# Patient Record
Sex: MALE | Race: WHITE | NOT HISPANIC OR LATINO | URBAN - METROPOLITAN AREA
[De-identification: names, ages, dates, MRNs, and addresses within clinical notes are randomized per-mention and may not be internally consistent; named-entity substitution may affect disease eponyms.]

---

## 2019-08-14 ENCOUNTER — INPATIENT (INPATIENT)
Facility: HOSPITAL | Age: 69
LOS: 5 days | Discharge: NURSING, REHAB OR LONG-TERM CARE | DRG: 690 | End: 2019-08-20
Attending: INTERNAL MEDICINE | Admitting: INTERNAL MEDICINE
Payer: MEDICARE

## 2019-08-14 VITALS
DIASTOLIC BLOOD PRESSURE: 75 MMHG | RESPIRATION RATE: 18 BRPM | SYSTOLIC BLOOD PRESSURE: 156 MMHG | HEART RATE: 90 BPM | TEMPERATURE: 98 F | WEIGHT: 177.03 LBS | OXYGEN SATURATION: 99 %

## 2019-08-14 DIAGNOSIS — Z98.890 OTHER SPECIFIED POSTPROCEDURAL STATES: Chronic | ICD-10-CM

## 2019-08-14 DIAGNOSIS — F25.8 OTHER SCHIZOAFFECTIVE DISORDERS: ICD-10-CM

## 2019-08-14 DIAGNOSIS — I10 ESSENTIAL (PRIMARY) HYPERTENSION: ICD-10-CM

## 2019-08-14 DIAGNOSIS — Z29.9 ENCOUNTER FOR PROPHYLACTIC MEASURES, UNSPECIFIED: ICD-10-CM

## 2019-08-14 DIAGNOSIS — F02.81 DEMENTIA IN OTHER DISEASES CLASSIFIED ELSEWHERE, UNSPECIFIED SEVERITY, WITH BEHAVIORAL DISTURBANCE: ICD-10-CM

## 2019-08-14 DIAGNOSIS — H40.9 UNSPECIFIED GLAUCOMA: ICD-10-CM

## 2019-08-14 DIAGNOSIS — R45.851 SUICIDAL IDEATIONS: ICD-10-CM

## 2019-08-14 DIAGNOSIS — F31.9 BIPOLAR DISORDER, UNSPECIFIED: ICD-10-CM

## 2019-08-14 DIAGNOSIS — R45.1 RESTLESSNESS AND AGITATION: ICD-10-CM

## 2019-08-14 DIAGNOSIS — N39.0 URINARY TRACT INFECTION, SITE NOT SPECIFIED: ICD-10-CM

## 2019-08-14 DIAGNOSIS — K21.9 GASTRO-ESOPHAGEAL REFLUX DISEASE WITHOUT ESOPHAGITIS: ICD-10-CM

## 2019-08-14 LAB
ALBUMIN SERPL ELPH-MCNC: 3.5 G/DL — SIGNIFICANT CHANGE UP (ref 3.3–5)
ALP SERPL-CCNC: 98 U/L — SIGNIFICANT CHANGE UP (ref 30–120)
ALT FLD-CCNC: 12 U/L DA — SIGNIFICANT CHANGE UP (ref 10–60)
ANION GAP SERPL CALC-SCNC: 3 MMOL/L — LOW (ref 5–17)
APAP SERPL-MCNC: 1 UG/ML — LOW (ref 10–30)
APPEARANCE UR: ABNORMAL
AST SERPL-CCNC: 7 U/L — LOW (ref 10–40)
BASOPHILS # BLD AUTO: 0.02 K/UL — SIGNIFICANT CHANGE UP (ref 0–0.2)
BASOPHILS NFR BLD AUTO: 0.3 % — SIGNIFICANT CHANGE UP (ref 0–2)
BILIRUB SERPL-MCNC: 0.3 MG/DL — SIGNIFICANT CHANGE UP (ref 0.2–1.2)
BILIRUB UR-MCNC: NEGATIVE — SIGNIFICANT CHANGE UP
BUN SERPL-MCNC: 14 MG/DL — SIGNIFICANT CHANGE UP (ref 7–23)
CALCIUM SERPL-MCNC: 9.3 MG/DL — SIGNIFICANT CHANGE UP (ref 8.4–10.5)
CHLORIDE SERPL-SCNC: 105 MMOL/L — SIGNIFICANT CHANGE UP (ref 96–108)
CO2 SERPL-SCNC: 31 MMOL/L — SIGNIFICANT CHANGE UP (ref 22–31)
COLOR SPEC: YELLOW — SIGNIFICANT CHANGE UP
CREAT SERPL-MCNC: 0.88 MG/DL — SIGNIFICANT CHANGE UP (ref 0.5–1.3)
DIFF PNL FLD: ABNORMAL
EOSINOPHIL # BLD AUTO: 0.13 K/UL — SIGNIFICANT CHANGE UP (ref 0–0.5)
EOSINOPHIL NFR BLD AUTO: 1.7 % — SIGNIFICANT CHANGE UP (ref 0–6)
ETHANOL SERPL-MCNC: <3 MG/DL — SIGNIFICANT CHANGE UP (ref 0–3)
GLUCOSE SERPL-MCNC: 97 MG/DL — SIGNIFICANT CHANGE UP (ref 70–99)
GLUCOSE UR QL: NEGATIVE MG/DL — SIGNIFICANT CHANGE UP
HCT VFR BLD CALC: 46.3 % — SIGNIFICANT CHANGE UP (ref 39–50)
HGB BLD-MCNC: 15.6 G/DL — SIGNIFICANT CHANGE UP (ref 13–17)
IMM GRANULOCYTES NFR BLD AUTO: 0.4 % — SIGNIFICANT CHANGE UP (ref 0–1.5)
KETONES UR-MCNC: ABNORMAL
LEUKOCYTE ESTERASE UR-ACNC: ABNORMAL
LYMPHOCYTES # BLD AUTO: 1.83 K/UL — SIGNIFICANT CHANGE UP (ref 1–3.3)
LYMPHOCYTES # BLD AUTO: 23.4 % — SIGNIFICANT CHANGE UP (ref 13–44)
MCHC RBC-ENTMCNC: 30.3 PG — SIGNIFICANT CHANGE UP (ref 27–34)
MCHC RBC-ENTMCNC: 33.7 GM/DL — SIGNIFICANT CHANGE UP (ref 32–36)
MCV RBC AUTO: 89.9 FL — SIGNIFICANT CHANGE UP (ref 80–100)
MONOCYTES # BLD AUTO: 0.47 K/UL — SIGNIFICANT CHANGE UP (ref 0–0.9)
MONOCYTES NFR BLD AUTO: 6 % — SIGNIFICANT CHANGE UP (ref 2–14)
NEUTROPHILS # BLD AUTO: 5.33 K/UL — SIGNIFICANT CHANGE UP (ref 1.8–7.4)
NEUTROPHILS NFR BLD AUTO: 68.2 % — SIGNIFICANT CHANGE UP (ref 43–77)
NITRITE UR-MCNC: POSITIVE
NRBC # BLD: 0 /100 WBCS — SIGNIFICANT CHANGE UP (ref 0–0)
PCP SPEC-MCNC: SIGNIFICANT CHANGE UP
PH UR: 8 — SIGNIFICANT CHANGE UP (ref 5–8)
PLATELET # BLD AUTO: 264 K/UL — SIGNIFICANT CHANGE UP (ref 150–400)
POTASSIUM SERPL-MCNC: 3.7 MMOL/L — SIGNIFICANT CHANGE UP (ref 3.5–5.3)
POTASSIUM SERPL-SCNC: 3.7 MMOL/L — SIGNIFICANT CHANGE UP (ref 3.5–5.3)
PROT SERPL-MCNC: 7.1 G/DL — SIGNIFICANT CHANGE UP (ref 6–8.3)
PROT UR-MCNC: NEGATIVE MG/DL — SIGNIFICANT CHANGE UP
RBC # BLD: 5.15 M/UL — SIGNIFICANT CHANGE UP (ref 4.2–5.8)
RBC # FLD: 13.5 % — SIGNIFICANT CHANGE UP (ref 10.3–14.5)
SALICYLATES SERPL-MCNC: 0.5 MG/DL — LOW (ref 2.8–20)
SODIUM SERPL-SCNC: 139 MMOL/L — SIGNIFICANT CHANGE UP (ref 135–145)
SP GR SPEC: 1.01 — SIGNIFICANT CHANGE UP (ref 1.01–1.02)
UROBILINOGEN FLD QL: NEGATIVE MG/DL — SIGNIFICANT CHANGE UP
WBC # BLD: 7.81 K/UL — SIGNIFICANT CHANGE UP (ref 3.8–10.5)
WBC # FLD AUTO: 7.81 K/UL — SIGNIFICANT CHANGE UP (ref 3.8–10.5)

## 2019-08-14 PROCEDURE — 71045 X-RAY EXAM CHEST 1 VIEW: CPT | Mod: 26

## 2019-08-14 PROCEDURE — 99285 EMERGENCY DEPT VISIT HI MDM: CPT

## 2019-08-14 PROCEDURE — 99223 1ST HOSP IP/OBS HIGH 75: CPT | Mod: AI

## 2019-08-14 RX ORDER — LATANOPROST 0.05 MG/ML
1 SOLUTION/ DROPS OPHTHALMIC; TOPICAL AT BEDTIME
Refills: 0 | Status: DISCONTINUED | OUTPATIENT
Start: 2019-08-14 | End: 2019-08-20

## 2019-08-14 RX ORDER — PANTOPRAZOLE SODIUM 20 MG/1
40 TABLET, DELAYED RELEASE ORAL
Refills: 0 | Status: DISCONTINUED | OUTPATIENT
Start: 2019-08-14 | End: 2019-08-20

## 2019-08-14 RX ORDER — DOCUSATE SODIUM 100 MG
200 CAPSULE ORAL AT BEDTIME
Refills: 0 | Status: DISCONTINUED | OUTPATIENT
Start: 2019-08-14 | End: 2019-08-20

## 2019-08-14 RX ORDER — AMLODIPINE BESYLATE 2.5 MG/1
5 TABLET ORAL DAILY
Refills: 0 | Status: DISCONTINUED | OUTPATIENT
Start: 2019-08-14 | End: 2019-08-20

## 2019-08-14 RX ORDER — DIVALPROEX SODIUM 500 MG/1
750 TABLET, DELAYED RELEASE ORAL EVERY 12 HOURS
Refills: 0 | Status: DISCONTINUED | OUTPATIENT
Start: 2019-08-14 | End: 2019-08-20

## 2019-08-14 RX ORDER — LAMOTRIGINE 25 MG/1
100 TABLET, ORALLY DISINTEGRATING ORAL
Refills: 0 | Status: DISCONTINUED | OUTPATIENT
Start: 2019-08-14 | End: 2019-08-20

## 2019-08-14 RX ORDER — HYDRALAZINE HCL 50 MG
50 TABLET ORAL THREE TIMES A DAY
Refills: 0 | Status: DISCONTINUED | OUTPATIENT
Start: 2019-08-14 | End: 2019-08-20

## 2019-08-14 RX ORDER — FOLIC ACID 0.8 MG
1 TABLET ORAL DAILY
Refills: 0 | Status: DISCONTINUED | OUTPATIENT
Start: 2019-08-14 | End: 2019-08-20

## 2019-08-14 RX ORDER — ENOXAPARIN SODIUM 100 MG/ML
40 INJECTION SUBCUTANEOUS DAILY
Refills: 0 | Status: DISCONTINUED | OUTPATIENT
Start: 2019-08-14 | End: 2019-08-20

## 2019-08-14 RX ORDER — CEFTRIAXONE 500 MG/1
1000 INJECTION, POWDER, FOR SOLUTION INTRAMUSCULAR; INTRAVENOUS ONCE
Refills: 0 | Status: COMPLETED | OUTPATIENT
Start: 2019-08-14 | End: 2019-08-14

## 2019-08-14 RX ORDER — ACETAMINOPHEN 500 MG
650 TABLET ORAL EVERY 6 HOURS
Refills: 0 | Status: DISCONTINUED | OUTPATIENT
Start: 2019-08-14 | End: 2019-08-20

## 2019-08-14 RX ORDER — ALPRAZOLAM 0.25 MG
0.25 TABLET ORAL ONCE
Refills: 0 | Status: DISCONTINUED | OUTPATIENT
Start: 2019-08-14 | End: 2019-08-15

## 2019-08-14 RX ORDER — DOCUSATE SODIUM 100 MG
100 CAPSULE ORAL DAILY
Refills: 0 | Status: DISCONTINUED | OUTPATIENT
Start: 2019-08-14 | End: 2019-08-20

## 2019-08-14 RX ORDER — CEFTRIAXONE 500 MG/1
1000 INJECTION, POWDER, FOR SOLUTION INTRAMUSCULAR; INTRAVENOUS EVERY 24 HOURS
Refills: 0 | Status: DISCONTINUED | OUTPATIENT
Start: 2019-08-14 | End: 2019-08-20

## 2019-08-14 RX ADMIN — CEFTRIAXONE 1000 MILLIGRAM(S): 500 INJECTION, POWDER, FOR SOLUTION INTRAMUSCULAR; INTRAVENOUS at 20:36

## 2019-08-14 RX ADMIN — CEFTRIAXONE 100 MILLIGRAM(S): 500 INJECTION, POWDER, FOR SOLUTION INTRAMUSCULAR; INTRAVENOUS at 19:25

## 2019-08-14 RX ADMIN — Medication 0.5 MILLIGRAM(S): at 20:42

## 2019-08-14 RX ADMIN — Medication 50 MILLIGRAM(S): at 22:57

## 2019-08-14 NOTE — ED PROVIDER NOTE - PROGRESS NOTE DETAILS
Dw MD Dr Carrington [phone] - states pt made verbal threat to hurt himself after being upset about being discharged from his SNF / rehab. This is unusual for pt, no hx in past. Pt doing well, no acute co>. Awaiting telepsy dispo Dw telepsy, pt with recent decline , will need admission. Due to comorbdities, will need admission to med, 1:1 observation, they will follow pt - for later placement

## 2019-08-14 NOTE — ED BEHAVIORAL HEALTH ASSESSMENT NOTE - RISK ASSESSMENT
Acute Suicide Risk  (  ) High   (x  ) Moderate   (  ) Low   (  ) Unable to determine   Rationale ___reporting recent suicidal ideation with hx of SA________     Elevated Chronic Risk   (  x) Yes _chronic mental illness, hx of self harm, impulsive, dementia, poor coping skills__________  Details ___________  (  ) No   ___________

## 2019-08-14 NOTE — ED PROVIDER NOTE - CARE PLAN
Principal Discharge DX:	Agitation  Goal:	with possible suicidal ideation Principal Discharge DX:	Agitation  Secondary Diagnosis:	Suicidal ideation  Secondary Diagnosis:	Urinary tract infection without hematuria, site unspecified

## 2019-08-14 NOTE — ED PROVIDER NOTE - CLINICAL SUMMARY MEDICAL DECISION MAKING FREE TEXT BOX
Verbalized possible suicidal ideation today at SNF after acting out to bad news. Denies S/H ideation at this time. Check labs, XR, telepsy

## 2019-08-14 NOTE — ED BEHAVIORAL HEALTH ASSESSMENT NOTE - DIFFERENTIAL
UTI  schizoaffective  dementia  C-SSRS Screener   1. Have you ever wished to be dead or wished you could go to sleep and not wake up?  [ x ]Yes, [  ]No, [  ]Unable to Assess  Details _____making SI statements________________________   2. Have you actually had any thoughts of killing yourself?   [  ]Yes, [  ]No, [ x ]Unable to Assess  Details _____________________________   If answer is “No” for 1 and 2, stop here. If answer is “Yes” to 1 or 2, proceed to 3.   3. Have you been thinking about how you might kill yourself?  [  ]Yes, [ x ]No, [  ]Unable to Assess  Details _____________________________   4. Have you had these thoughts and had some intention of acting on them?  [  ]Yes, [  ]No, [x  ]Unable to Assess  Details _____________________________  5. Have you started to work out or worked out the details of how to kill yourself? Do you intend to carry out this plan?  [  ]Yes, [  ]No, [x  ]Unable to Assess  Details _____________________________  6. Have you ever done anything, started to do anything, or prepared to do anything to end your life? If so, was it in the past 3 months?  [  ]Yes, [  ]No, [x  ]Unable to Assess  Details _____________________________   Additional Suicide Risk Factors (select all that apply)  [  ]Access to lethal means including firearms  [  ]Family history of suicide  [ x ]Impulsivity  [  ] Current or past mood disorder  [x  ] Current or past psychotic disorder  [  ] Current or past PTSD  [  ] Current or past ADHD  [ x ] Current or past TBI  [  ] Current or past cluster B personality disorder or traits  [  ] Current or past conduct problems  [  ] Recent onset of current or past psychiatric disorder  [  ] Family history of psychiatric diagnoses requiring hospitalization  Additional Activating Events (select all that apply)  [  ]Perceived burden on family or others  [  ]Current sexual or physical abuse  [  ]Substance intoxication or withdrawal  [  ]Inadequate social supports  [  ]Hopeless about or dissatisfied with current provider or treatment  Additional Protective Factors (select all that apply)  [  ] Future plans  [  ] Christianity beliefs  [  ] Beloved pets

## 2019-08-14 NOTE — ED BEHAVIORAL HEALTH ASSESSMENT NOTE - OTHER PAST PSYCHIATRIC HISTORY (INCLUDE DETAILS REGARDING ONSET, COURSE OF ILLNESS, INPATIENT/OUTPATIENT TREATMENT)
hx of court appointed legal guardian for medical decision making   hx of psychiatric admission for schizoaffective  hx of SI/SA

## 2019-08-14 NOTE — ED PROVIDER NOTE - RESPIRATORY, MLM
Breath sounds clear and equal bilaterally. Breath sounds clear and equal bilaterally. nl resp effort. no w/r/r.

## 2019-08-14 NOTE — ED BEHAVIORAL HEALTH ASSESSMENT NOTE - HPI (INCLUDE ILLNESS QUALITY, SEVERITY, DURATION, TIMING, CONTEXT, MODIFYING FACTORS, ASSOCIATED SIGNS AND SYMPTOMS)
68 year old male, currently in treatment at [facility] (short term rehabilitation [place]), PMHx of TBI, NPH with shunt, hx of TBI, hx of encephalopathy, HTN, dementia with behavioral disturbance, PPHx Bipolar vs schizoaffective disorder with chronic irritability, hx of SI and suicide attempt, with court appointed legal guardian, presenting to Emergency Department after agitation and suicidal ideation at Rehab.    Patient on exam Is notably irritable, stating he does not like all the "questions I have to answer." Denies a past psychiatry history stating "my family is the one who are alcoholics." Denies current suicidal ideation or homicidal ideation and is unable to tell writer why he is in Emergency Department other than "I have bad knees, they bothered me and I just wanted to sleep." He gets more irritable stating "and don't ask me the time or date, I don't know!". He then says "I don't want to be yelling at you but these questions are bothering me." He does provide cousin's phone number. He also reports "I just want to go home and live on the streets."    Spoke with Elvira, rehab , who reports patient has been living at the [facility] due to deconditioning and poor mobility, also has chronic dementia, TBI, and schizoaffective disorder. States at baseline patient is irritable, chronically difficult and verbally aggressive. However says over the past 2 weeks he has been notably more irritable after his roommate was discharged. Making statements about staff at Rehab such as "get me a gun I want to shoot them" and "I just want to slit their throats." Elvira believes patient has no capacity to make medical decisions and this role was assigned to dorothysin, Anel Cook, as legal guardian. Elvira reports patient is compliant with medications if they are placed in his food or drinks. Otherwise he is chronically paranoid towards staff.    Spoke with mell Tam and legal guardian of patient. She reports patient's dementia has been worsening with signs of worsening forgetfulness and agitation. Reports chronic agitation and unable to care for himself. Patient has recently escalated making more threats about suicidal ideation and homicidal ideation and patient has a history of self harm in past. She reports she is attempting to find him a nursing home that is better suited to care for his complex needs. 68 year old male, currently in treatment at [facility] (short term rehabilitation [place]), PMHx of TBI, NPH with shunt, hx of TBI, hx of encephalopathy, HTN, dementia with behavioral disturbance, PPHx Bipolar vs schizoaffective disorder with chronic irritability, hx of SI and suicide attempt, with court appointed legal guardian, presenting to Emergency Department after agitation and suicidal ideation at Rehab.    Patient on exam Is notably irritable, stating he does not like all the "questions I have to answer." Denies a past psychiatry history stating "my family is the one who are alcoholics." Denies current suicidal ideation or homicidal ideation and is unable to tell writer why he is in Emergency Department other than "I have bad knees, they bothered me and I just wanted to sleep." He gets more irritable stating "and don't ask me the time or date, I don't know!". He then says "I don't want to be yelling at you but these questions are bothering me." He does provide cousin's phone number. He also reports "I just want to go home and live on the streets."    Spoke with Elvira, rehab , who reports patient has been living at the [facility] due to deconditioning and poor mobility, also has chronic dementia, TBI, and schizoaffective disorder. States at baseline patient is irritable, chronically difficult and verbally aggressive. However says over the past 2 weeks he has been notably more irritable after his roommate was discharged. Making statements about staff at Rehab such as "get me a gun I want to shoot them" and "I just want to slit their throats." Elvira believes patient has no capacity to make medical decisions and this role was assigned to cousin, Anel Rg, as legal guardian. Elvira reports patient is compliant with medications if they are placed in his food or drinks. Otherwise he is chronically paranoid towards staff.    Spoke with mell Tam and legal guardian of patient. She reports patient's dementia has been worsening with signs of worsening forgetfulness and agitation. Reports chronic agitation and unable to care for himself. Patient has recently escalated making more threats about suicidal ideation and homicidal ideation and patient has a history of self harm in past. She reports she is attempting to find him a nursing home that is better suited to care for his complex needs.

## 2019-08-14 NOTE — ED BEHAVIORAL HEALTH ASSESSMENT NOTE - SUMMARY
68 year old male, currently in treatment at [facility] (short term rehabilitation [place]), PMHx of TBI, NPH with shunt, hx of TBI, hx of encephalopathy, HTN, dementia with behavioral disturbance, PPHx Bipolar vs schizoaffective disorder with chronic irritability, hx of SI and suicide attempt, with court appointed legal guardian, presenting to Emergency Department after agitation and suicidal ideation at Rehab.    Patient has comorbid medical (UTI, TBI, dementia) and psychiatric history (long standing schizoaffective disorder). Patient with recent worsening irritability and SI that can be attributed to the UTI, change in roommate and worsening dementia. Patient requires longer term placement and lacks capacity to leave hospital at this time.

## 2019-08-14 NOTE — ED BEHAVIORAL HEALTH ASSESSMENT NOTE - DESCRIPTION
Patient received IVPB of ceftriaxone for indication of UTI.  Described as irritable by staff. Did not report any suicidal ideation or homicidal ideation in the Emergency Department. Did not require IM sedation or restraints.  Not able to state date but is aware of current situation. normal pressure hydrocephalus s/p shunt placement 2/9/2017 disabled, current in rehab

## 2019-08-14 NOTE — ED BEHAVIORAL HEALTH ASSESSMENT NOTE - OTHER
cousin [facility] notable tremors in hands defer concrete, impaired rationality does not appear to be RIS dementia UTI, loss of roommate external

## 2019-08-14 NOTE — ED BEHAVIORAL HEALTH ASSESSMENT NOTE - DETAILS
patient currently denies SI however cousin reports as early as today patient was making threats to harm himself; history of suicide attempt in past nursing staff at rehab reports patient can get aggressive but currently patient denies HI patient reports "my family are all alcoholics" 20% service line "my knees are no good" will sign out to psych C/L to follow with medical team spoke to rehab and cousin

## 2019-08-14 NOTE — H&P ADULT - NSHPPHYSICALEXAM_GEN_ALL_CORE
-    Vital Signs Last 24 Hrs  T(C): 36.7 (14 Aug 2019 17:03), Max: 36.8 (14 Aug 2019 15:46)  T(F): 98 (14 Aug 2019 17:03), Max: 98.2 (14 Aug 2019 15:46)  HR: 84 (14 Aug 2019 17:03) (84 - 90)  BP: 164/73 (14 Aug 2019 17:03) (156/75 - 164/73)  BP(mean): --  RR: 18 (14 Aug 2019 17:03) (18 - 18)  SpO2: 96% (14 Aug 2019 17:03) (96% - 99%) -    Vital Signs Last 24 Hrs  T(C): 36.7 (14 Aug 2019 17:03), Max: 36.8 (14 Aug 2019 15:46)  T(F): 98 (14 Aug 2019 17:03), Max: 98.2 (14 Aug 2019 15:46)  HR: 84 (14 Aug 2019 17:03) (84 - 90)  BP: 164/73 (14 Aug 2019 17:03) (156/75 - 164/73)  BP(mean): --  RR: 18 (14 Aug 2019 17:03) (18 - 18)  SpO2: 96% (14 Aug 2019 17:03) (96% - 99%)        PHYSICAL EXAM:  	  GENERAL: NAD, well-developed.  HEAD:  Right parietal old traumatic skull deformity.  EYES: PERRLA, conjunctiva clear.  ENMT: no nasal discharge, no angelina-pharyngeal erythema or exudates, MMM.   NECK: Supple, No JVD.  NERVOUS SYSTEM:  Alert & oriented to place & person, but not to time, neurologically intact grossly.  CHEST/LUNG: Good air entry B/L, no rales, rhonchi, or wheezing.  HEART: Normal S1 & acc S2, no murmurs, or extra sounds.  ABDOMEN: Soft, non-tender, non-distended; bowel sounds present, no palpable masses or organomegaly.  EXTREMITIES:  No clubbing, cyanosis, or edema.  VASCULAR: 2+ radial, DPA / PTA pulses B/L.  SKIN: No rashes or lesions.  PSYCH: Irritable, angry, agitated most of the time. -    Vital Signs Last 24 Hrs  T(C): 36.7 (14 Aug 2019 17:03), Max: 36.8 (14 Aug 2019 15:46)  T(F): 98 (14 Aug 2019 17:03), Max: 98.2 (14 Aug 2019 15:46)  HR: 84 (14 Aug 2019 17:03) (84 - 90)  BP: 164/73 (14 Aug 2019 17:03) (156/75 - 164/73)  BP(mean): --  RR: 18 (14 Aug 2019 17:03) (18 - 18)  SpO2: 96% (14 Aug 2019 17:03) (96% - 99%)        PHYSICAL EXAM:  	  GENERAL: NAD, well-developed.  HEAD:  Right parietal old traumatic skull deformity.  EYES: PERRLA, conjunctiva clear.  ENMT: no nasal discharge, no angelina-pharyngeal erythema or exudates, MMM.   NECK: Supple, No JVD.  NERVOUS SYSTEM:  Alert & oriented to place & person, but not to time, neurologically intact grossly.  CHEST/LUNG: Good air entry B/L, no rales, rhonchi, or wheezing.  HEART: Normal S1 & acc S2, no murmurs, or extra sounds.  ABDOMEN: Soft, non-tender, non-distended; bowel sounds present, no palpable masses or organomegaly.  EXTREMITIES:  No clubbing, cyanosis, or edema.  VASCULAR: 2+ radial, DPA / PTA pulses B/L.  SKIN: No rashes or lesions.  PSYCH: Irritable, angry, labile mood, agitated most of the time.

## 2019-08-14 NOTE — ED ADULT NURSE REASSESSMENT NOTE - NS ED NURSE REASSESS COMMENT FT1
pt awake and alert on stretcher respirations even and unlabored color good pt offers no complaints at this time  1:1 at bedside CO maintained pt awaiting consult dispo decision

## 2019-08-14 NOTE — ED BEHAVIORAL HEALTH ASSESSMENT NOTE - PSYCHIATRIC ISSUES AND PLAN (INCLUDE STANDING AND PRN MEDICATION)
continue standing psych meds; lamictal 100mg twice daily, depakote sprinkles 750mg twice daily (sprinkle on food)- check depakote level; KEEP ON 1:1; for agitation can give haldol 2.5mg q6 hr prn (check QTc)

## 2019-08-14 NOTE — H&P ADULT - PROBLEM SELECTOR PLAN 7
IMPROVE VTE Individual Risk Assessment          RISK                                                          Points    [  ] Previous VTE                                                3  [  ] Thrombophilia                                             2  [  ] Lower limb paralysis                                    2       (unable to hold up >15 seconds)    [  ] Current Cancer                                             2         (within 6 months)  [ x] Immobilization > 24 hrs (expected while inpatient)    1  [  ] ICU/CCU stay > 24 hours                            1  [ x ] Age > 60                                                    1    IMPROVE VTE Score 2.    **IMPROVE score of 2, started him on LMWH 40 mg sub Q daily for DVT prophylaxis.

## 2019-08-14 NOTE — ED ADULT NURSE NOTE - CHIEF COMPLAINT QUOTE
Patient sent from rehab facility s/p verbal altercation. patient ending his stay at rehab facility and was told he was going to be discharged to a different long term housing facility when patient became upset, angry and made a violent statement about slitting someone's throat. Patient denies SI or homicidal. admits he made statement out of anger.

## 2019-08-14 NOTE — H&P ADULT - ASSESSMENT
67 y/o M with PMH of HTN, TBI, Normal Pressure Hydrocephalus with shunt, GERD, Glaucoma, and Bipolar disorder sent from rehab facility for hostility & suicidal ideations.

## 2019-08-14 NOTE — ED BEHAVIORAL HEALTH ASSESSMENT NOTE - CURRENT MEDICATION
hydralazine 50mg TID, folic acid 1mg daily, lamictal 100mg twice daily, norvasc 5mg daily, prilosec 20mg ER twice daily, docusate 300mg TID  depakote sprinkle capsules 750mg BID

## 2019-08-14 NOTE — ED ADULT NURSE NOTE - NSHOSCREENINGSIGNS_ED_ALL_ED
reacting to disappointments, criticisms or teasing with extreme and intense anger, blame or a desire for revenge/increasing anger, aggression and destructive behavior/dwelling on experiences of rejection, on injustices or unrealistic fears

## 2019-08-14 NOTE — ED ADULT NURSE NOTE - NSIMPLEMENTINTERV_GEN_ALL_ED
Implemented All Fall with Harm Risk Interventions:  [place] to call system. Call bell, personal items and telephone within reach. Instruct patient to call for assistance. Room bathroom lighting operational. Non-slip footwear when patient is off stretcher. Physically safe environment: no spills, clutter or unnecessary equipment. Stretcher in lowest position, wheels locked, appropriate side rails in place. Provide visual cue, wrist band, yellow gown, etc. Monitor gait and stability. Monitor for mental status changes and reorient to person, place, and time. Review medications for side effects contributing to fall risk. Reinforce activity limits and safety measures with patient and family. Provide visual clues: red socks.

## 2019-08-14 NOTE — H&P ADULT - HISTORY OF PRESENT ILLNESS
This is a 67 y/o M with PMH of HTN, TBI, Normal Pressure Hydrocephalus with shunt, GERD, Glaucoma, and Bipolar disorder who was sent from [facility] facility for hostility & suicidal ideations. As per staff there patient has been living at the facility fro deconditioning, poor mobility & dementia, he is difficult and verbally aggressive at baseline, and unable to care for himself. Over the past 2 weeks he has been notably more irritable after his roommate was discharged, making statements about staff at rehab such as "get me a gun I want to shoot them" and "I just want to slit their throats". Recently he started making more threats about suicidal ideation and homicidal ideation. Of note that patient has a history of a suicidal attempt in the past. At the ED he was found to have a UTI, was evaluated by a tele psych psychiatrist which requested medical admission for placement at a nursing home that is better suited to care for his complex needs, while being followed by psych for the above psych issues. Patient was agitated, shouting "leave me alone", cooperated during my exam, but refused to answer any questions, no further history could be obtained at this time.

## 2019-08-14 NOTE — ED PROVIDER NOTE - PSYCHIATRIC, MLM
Alert and oriented to person, place, time/situation. normal mood and affect. no apparent risk to self or others. Alert and oriented to person, place, time/situation. normal mood and affect. no apparent risk to self or others at this time.

## 2019-08-14 NOTE — ED PROVIDER NOTE - ENMT, MLM
Airway patent, Nasal mucosa clear. Mouth with normal mucosa. Throat has no vesicles, no oropharyngeal exudates and uvula is midline. Airway patent, Nasal mucosa clear. Mouth with normal mucosa. Throat has no vesicles, no oropharyngeal exudates and uvula is midline. nl resp effort. no w/r/r

## 2019-08-14 NOTE — ED PROVIDER NOTE - OBJECTIVE STATEMENT
67 y/o male with a PMHx of HTN presents to the ED c/o agitation. EMS said he was calm and cooperative with them but he had an argument with someone at the facility he was at and said he wanted to stab them but denies any SI or HI in the ER. Pt is angry at the situation and doesn't think he should have been sent to the hospital. As per pt facility pt threatened to kill himself. 69 y/o male with a PMHx of HTN presents to the ED c/o agitation. EMS said he was calm and cooperative with them but he had an argument with someone at the facility he was at and said he wanted to stab them / himself.  Pt is angry at the situation and doesn't think he should have been sent to the hospital.  At this time pt states he was mad that he was being discharged out of his facility and "acted out". Pt denies other co. No trauma. no fever/chills. no cp/sob/palp. ,no somatic complaints. 69 y/o male with a PMHx of HTN presents to the ED c/o agitation. EMS said he was calm and cooperative with them but he had an argument with someone at the facility he was at and said he wanted to stab them / himself.  Pt is angry at the situation and doesn't think he should have been sent to the hospital.  At this time pt states he was mad that he was being discharged out of his facility and "acted out". Pt denies other co. No trauma. no fever/chills. no cp/sob/palp. ,no somatic complaints. No urinary sx, no dysuria / hematuria / fever / abd pain.

## 2019-08-14 NOTE — H&P ADULT - PROBLEM SELECTOR PLAN 2
with homicidal threatening statements, and history of suicidal attempt in the past, was initially evaluated by tele psych psychiatrist prior to admission who recommended medical admission for placement  in a NH that can accommodate his complex needs. Admitted to medical floor, started on constant observation 1:1 for safety, PRN short acting benzodiazepines for agitation/anxiety, Psych consult with Dr. Borjas was called.  Patient asked me for a phone to call his cousine (guardian), was very angry during the call, hanged up stating that she is "useless", and he will never talk to her again when he leaves the hospital,  & case management consults for placement.

## 2019-08-15 LAB
BASOPHILS # BLD AUTO: 0.03 K/UL — SIGNIFICANT CHANGE UP (ref 0–0.2)
BASOPHILS NFR BLD AUTO: 0.3 % — SIGNIFICANT CHANGE UP (ref 0–2)
EOSINOPHIL # BLD AUTO: 0.17 K/UL — SIGNIFICANT CHANGE UP (ref 0–0.5)
EOSINOPHIL NFR BLD AUTO: 1.8 % — SIGNIFICANT CHANGE UP (ref 0–6)
HCT VFR BLD CALC: 47.3 % — SIGNIFICANT CHANGE UP (ref 39–50)
HCV AB S/CO SERPL IA: 0.21 S/CO — SIGNIFICANT CHANGE UP (ref 0–0.99)
HCV AB SERPL-IMP: SIGNIFICANT CHANGE UP
HGB BLD-MCNC: 16 G/DL — SIGNIFICANT CHANGE UP (ref 13–17)
IMM GRANULOCYTES NFR BLD AUTO: 0.4 % — SIGNIFICANT CHANGE UP (ref 0–1.5)
LYMPHOCYTES # BLD AUTO: 1.87 K/UL — SIGNIFICANT CHANGE UP (ref 1–3.3)
LYMPHOCYTES # BLD AUTO: 19.6 % — SIGNIFICANT CHANGE UP (ref 13–44)
MCHC RBC-ENTMCNC: 30.4 PG — SIGNIFICANT CHANGE UP (ref 27–34)
MCHC RBC-ENTMCNC: 33.8 GM/DL — SIGNIFICANT CHANGE UP (ref 32–36)
MCV RBC AUTO: 89.8 FL — SIGNIFICANT CHANGE UP (ref 80–100)
MONOCYTES # BLD AUTO: 0.63 K/UL — SIGNIFICANT CHANGE UP (ref 0–0.9)
MONOCYTES NFR BLD AUTO: 6.6 % — SIGNIFICANT CHANGE UP (ref 2–14)
NEUTROPHILS # BLD AUTO: 6.79 K/UL — SIGNIFICANT CHANGE UP (ref 1.8–7.4)
NEUTROPHILS NFR BLD AUTO: 71.3 % — SIGNIFICANT CHANGE UP (ref 43–77)
NRBC # BLD: 0 /100 WBCS — SIGNIFICANT CHANGE UP (ref 0–0)
PLATELET # BLD AUTO: 256 K/UL — SIGNIFICANT CHANGE UP (ref 150–400)
RBC # BLD: 5.27 M/UL — SIGNIFICANT CHANGE UP (ref 4.2–5.8)
RBC # FLD: 13.6 % — SIGNIFICANT CHANGE UP (ref 10.3–14.5)
TSH SERPL-MCNC: 2.8 UIU/ML — SIGNIFICANT CHANGE UP (ref 0.27–4.2)
WBC # BLD: 9.53 K/UL — SIGNIFICANT CHANGE UP (ref 3.8–10.5)
WBC # FLD AUTO: 9.53 K/UL — SIGNIFICANT CHANGE UP (ref 3.8–10.5)

## 2019-08-15 PROCEDURE — 99233 SBSQ HOSP IP/OBS HIGH 50: CPT

## 2019-08-15 PROCEDURE — 90792 PSYCH DIAG EVAL W/MED SRVCS: CPT

## 2019-08-15 RX ADMIN — CEFTRIAXONE 100 MILLIGRAM(S): 500 INJECTION, POWDER, FOR SOLUTION INTRAMUSCULAR; INTRAVENOUS at 17:34

## 2019-08-15 RX ADMIN — DIVALPROEX SODIUM 750 MILLIGRAM(S): 500 TABLET, DELAYED RELEASE ORAL at 07:06

## 2019-08-15 RX ADMIN — AMLODIPINE BESYLATE 5 MILLIGRAM(S): 2.5 TABLET ORAL at 07:06

## 2019-08-15 RX ADMIN — Medication 50 MILLIGRAM(S): at 22:11

## 2019-08-15 RX ADMIN — PANTOPRAZOLE SODIUM 40 MILLIGRAM(S): 20 TABLET, DELAYED RELEASE ORAL at 07:05

## 2019-08-15 RX ADMIN — LATANOPROST 1 DROP(S): 0.05 SOLUTION/ DROPS OPHTHALMIC; TOPICAL at 22:12

## 2019-08-15 RX ADMIN — Medication 200 MILLIGRAM(S): at 22:11

## 2019-08-15 RX ADMIN — DIVALPROEX SODIUM 750 MILLIGRAM(S): 500 TABLET, DELAYED RELEASE ORAL at 17:34

## 2019-08-15 RX ADMIN — LAMOTRIGINE 100 MILLIGRAM(S): 25 TABLET, ORALLY DISINTEGRATING ORAL at 07:06

## 2019-08-15 RX ADMIN — LAMOTRIGINE 100 MILLIGRAM(S): 25 TABLET, ORALLY DISINTEGRATING ORAL at 17:34
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
## 2019-08-15 NOTE — PROGRESS NOTE ADULT - PROBLEM SELECTOR PLAN 2
- Hx of suicide attempt in past - slitting of throat  - recently suicidal and homicidal ideation  - not manageable at his facility  - Tele-psych consult reviewed  - Pysch Consult requested  - maintain 1:1  - Suicide Precautions  - Social Work for appropriate placement - Hx of suicide attempt in past - slitting of throat  - Hx of Bipolar and/or Schizoaffective Disorder  - recently suicidal and homicidal ideation  - not manageable at his facility  - Tele-psych consult reviewed  - Pysch Consult requested  - maintain 1:1  - Suicide Precautions  - Social Work for appropriate placement

## 2019-08-16 PROCEDURE — 99233 SBSQ HOSP IP/OBS HIGH 50: CPT

## 2019-08-16 PROCEDURE — 93010 ELECTROCARDIOGRAM REPORT: CPT

## 2019-08-16 PROCEDURE — 99231 SBSQ HOSP IP/OBS SF/LOW 25: CPT

## 2019-08-16 RX ADMIN — LAMOTRIGINE 100 MILLIGRAM(S): 25 TABLET, ORALLY DISINTEGRATING ORAL at 17:28

## 2019-08-16 RX ADMIN — LATANOPROST 1 DROP(S): 0.05 SOLUTION/ DROPS OPHTHALMIC; TOPICAL at 21:33

## 2019-08-16 RX ADMIN — PANTOPRAZOLE SODIUM 40 MILLIGRAM(S): 20 TABLET, DELAYED RELEASE ORAL at 07:06

## 2019-08-16 RX ADMIN — AMLODIPINE BESYLATE 5 MILLIGRAM(S): 2.5 TABLET ORAL at 07:05

## 2019-08-16 RX ADMIN — DIVALPROEX SODIUM 750 MILLIGRAM(S): 500 TABLET, DELAYED RELEASE ORAL at 17:28

## 2019-08-16 RX ADMIN — ENOXAPARIN SODIUM 40 MILLIGRAM(S): 100 INJECTION SUBCUTANEOUS at 13:32

## 2019-08-16 RX ADMIN — CEFTRIAXONE 100 MILLIGRAM(S): 500 INJECTION, POWDER, FOR SOLUTION INTRAMUSCULAR; INTRAVENOUS at 17:28

## 2019-08-16 RX ADMIN — Medication 1 MILLIGRAM(S): at 13:32

## 2019-08-16 RX ADMIN — DIVALPROEX SODIUM 750 MILLIGRAM(S): 500 TABLET, DELAYED RELEASE ORAL at 07:05

## 2019-08-16 RX ADMIN — LAMOTRIGINE 100 MILLIGRAM(S): 25 TABLET, ORALLY DISINTEGRATING ORAL at 07:05

## 2019-08-16 RX ADMIN — Medication 200 MILLIGRAM(S): at 21:33

## 2019-08-16 RX ADMIN — Medication 100 MILLIGRAM(S): at 13:32

## 2019-08-16 RX ADMIN — Medication 50 MILLIGRAM(S): at 07:05

## 2019-08-16 NOTE — PROGRESS NOTE ADULT - PROBLEM SELECTOR PLAN 2
- Hx of suicide attempt in past - slitting of throat  - Hx of Bipolar and/or Schizoaffective Disorder  - recent suicidal and homicidal ideation  - Tele-psych consult reviewed  - Luann f/u reviewed, called back this morning, ok to DC 1:1?  - maintain 1:1 for now  - Suicide Precautions  - Social Work for appropriate placement, not clear yet if he can return to [facility] (where he was)

## 2019-08-16 NOTE — PHYSICAL THERAPY INITIAL EVALUATION ADULT - ADDITIONAL COMMENTS
pt is a long term resident at nursing home. Unable to obtain accurate history from pt regarding mobility prior to admission

## 2019-08-16 NOTE — PHYSICAL THERAPY INITIAL EVALUATION ADULT - PERTINENT HX OF CURRENT PROBLEM, REHAB EVAL
pt is a 68 y/o male, admitted from rehab with homicidal and suicidal ideations, agitation. Deconditioning, poor mobility, dementia, +UTI
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
## 2019-08-17 PROCEDURE — 99233 SBSQ HOSP IP/OBS HIGH 50: CPT

## 2019-08-17 RX ADMIN — Medication 100 MILLIGRAM(S): at 13:02

## 2019-08-17 RX ADMIN — Medication 1 MILLIGRAM(S): at 13:02

## 2019-08-17 RX ADMIN — LATANOPROST 1 DROP(S): 0.05 SOLUTION/ DROPS OPHTHALMIC; TOPICAL at 21:48

## 2019-08-17 RX ADMIN — AMLODIPINE BESYLATE 5 MILLIGRAM(S): 2.5 TABLET ORAL at 06:18

## 2019-08-17 RX ADMIN — DIVALPROEX SODIUM 750 MILLIGRAM(S): 500 TABLET, DELAYED RELEASE ORAL at 06:18

## 2019-08-17 RX ADMIN — PANTOPRAZOLE SODIUM 40 MILLIGRAM(S): 20 TABLET, DELAYED RELEASE ORAL at 06:18

## 2019-08-17 RX ADMIN — ENOXAPARIN SODIUM 40 MILLIGRAM(S): 100 INJECTION SUBCUTANEOUS at 13:02

## 2019-08-17 RX ADMIN — LAMOTRIGINE 100 MILLIGRAM(S): 25 TABLET, ORALLY DISINTEGRATING ORAL at 17:39

## 2019-08-17 RX ADMIN — Medication 200 MILLIGRAM(S): at 21:49

## 2019-08-17 RX ADMIN — Medication 50 MILLIGRAM(S): at 06:18

## 2019-08-17 RX ADMIN — Medication 50 MILLIGRAM(S): at 13:02

## 2019-08-17 RX ADMIN — DIVALPROEX SODIUM 750 MILLIGRAM(S): 500 TABLET, DELAYED RELEASE ORAL at 17:47

## 2019-08-17 RX ADMIN — CEFTRIAXONE 100 MILLIGRAM(S): 500 INJECTION, POWDER, FOR SOLUTION INTRAMUSCULAR; INTRAVENOUS at 17:39

## 2019-08-17 RX ADMIN — LAMOTRIGINE 100 MILLIGRAM(S): 25 TABLET, ORALLY DISINTEGRATING ORAL at 06:18

## 2019-08-17 NOTE — PROGRESS NOTE ADULT - PROBLEM SELECTOR PLAN 2
- Hx of suicide attempt in past - slitting of throat  - Hx of Bipolar and/or Schizoaffective Disorder  - recent suicidal and homicidal ideation  - Tele-psych consult reviewed  - Lunan f/u reviewed, called back this morning, ok to DC 1:1?  - maintain 1:1 for now  - Suicide Precautions  - Social Work for appropriate placement, not clear yet if he can return to [facility] ([facility] he was)

## 2019-08-18 PROCEDURE — 99232 SBSQ HOSP IP/OBS MODERATE 35: CPT

## 2019-08-18 RX ADMIN — Medication 50 MILLIGRAM(S): at 13:00

## 2019-08-18 RX ADMIN — Medication 200 MILLIGRAM(S): at 21:15

## 2019-08-18 RX ADMIN — Medication 50 MILLIGRAM(S): at 21:15

## 2019-08-18 RX ADMIN — DIVALPROEX SODIUM 750 MILLIGRAM(S): 500 TABLET, DELAYED RELEASE ORAL at 17:01

## 2019-08-18 RX ADMIN — PANTOPRAZOLE SODIUM 40 MILLIGRAM(S): 20 TABLET, DELAYED RELEASE ORAL at 06:06

## 2019-08-18 RX ADMIN — CEFTRIAXONE 100 MILLIGRAM(S): 500 INJECTION, POWDER, FOR SOLUTION INTRAMUSCULAR; INTRAVENOUS at 17:01

## 2019-08-18 RX ADMIN — Medication 1 MILLIGRAM(S): at 12:00

## 2019-08-18 RX ADMIN — ENOXAPARIN SODIUM 40 MILLIGRAM(S): 100 INJECTION SUBCUTANEOUS at 12:00

## 2019-08-18 RX ADMIN — LAMOTRIGINE 100 MILLIGRAM(S): 25 TABLET, ORALLY DISINTEGRATING ORAL at 06:06

## 2019-08-18 RX ADMIN — LATANOPROST 1 DROP(S): 0.05 SOLUTION/ DROPS OPHTHALMIC; TOPICAL at 21:15

## 2019-08-18 RX ADMIN — DIVALPROEX SODIUM 750 MILLIGRAM(S): 500 TABLET, DELAYED RELEASE ORAL at 06:06

## 2019-08-18 RX ADMIN — LAMOTRIGINE 100 MILLIGRAM(S): 25 TABLET, ORALLY DISINTEGRATING ORAL at 17:01

## 2019-08-18 RX ADMIN — Medication 100 MILLIGRAM(S): at 12:00

## 2019-08-18 RX ADMIN — AMLODIPINE BESYLATE 5 MILLIGRAM(S): 2.5 TABLET ORAL at 06:06

## 2019-08-18 RX ADMIN — Medication 50 MILLIGRAM(S): at 06:06

## 2019-08-18 NOTE — PROGRESS NOTE ADULT - PROBLEM SELECTOR PLAN 2
- Hx of suicide attempt in past - slitting of throat  - Hx of Bipolar and/or Schizoaffective Disorder  - recent suicidal and homicidal ideation  - Discharge planning Psych direction  - Tele-psych consult reviewed  - Luann f/u reviewed, called back this morning, ok to DC 1:1?  - maintain 1:1 for now  - Suicide Precautions  - Social Work for appropriate placement, not clear yet if he can return to [facility] (where he was) - Hx of suicide attempt in past - slitting of throat  - Hx of Bipolar and/or Schizoaffective Disorder  - recent suicidal and homicidal ideation  - Discharge planning Psych direction  - Off 1:1  - Suicide Precautions  - Social Work for appropriate placement, not clear yet if he can return to [facility] (where he was)

## 2019-08-19 PROCEDURE — 99231 SBSQ HOSP IP/OBS SF/LOW 25: CPT

## 2019-08-19 PROCEDURE — 99232 SBSQ HOSP IP/OBS MODERATE 35: CPT

## 2019-08-19 RX ADMIN — LAMOTRIGINE 100 MILLIGRAM(S): 25 TABLET, ORALLY DISINTEGRATING ORAL at 17:29

## 2019-08-19 RX ADMIN — DIVALPROEX SODIUM 750 MILLIGRAM(S): 500 TABLET, DELAYED RELEASE ORAL at 17:29

## 2019-08-19 RX ADMIN — PANTOPRAZOLE SODIUM 40 MILLIGRAM(S): 20 TABLET, DELAYED RELEASE ORAL at 06:40

## 2019-08-19 RX ADMIN — Medication 50 MILLIGRAM(S): at 22:32

## 2019-08-19 RX ADMIN — LAMOTRIGINE 100 MILLIGRAM(S): 25 TABLET, ORALLY DISINTEGRATING ORAL at 06:40

## 2019-08-19 RX ADMIN — Medication 50 MILLIGRAM(S): at 14:30

## 2019-08-19 RX ADMIN — Medication 50 MILLIGRAM(S): at 06:40

## 2019-08-19 RX ADMIN — Medication 100 MILLIGRAM(S): at 12:14

## 2019-08-19 RX ADMIN — AMLODIPINE BESYLATE 5 MILLIGRAM(S): 2.5 TABLET ORAL at 06:40

## 2019-08-19 RX ADMIN — CEFTRIAXONE 100 MILLIGRAM(S): 500 INJECTION, POWDER, FOR SOLUTION INTRAMUSCULAR; INTRAVENOUS at 17:28

## 2019-08-19 RX ADMIN — DIVALPROEX SODIUM 750 MILLIGRAM(S): 500 TABLET, DELAYED RELEASE ORAL at 06:40

## 2019-08-19 RX ADMIN — LATANOPROST 1 DROP(S): 0.05 SOLUTION/ DROPS OPHTHALMIC; TOPICAL at 22:32

## 2019-08-19 RX ADMIN — Medication 200 MILLIGRAM(S): at 22:32

## 2019-08-19 RX ADMIN — Medication 650 MILLIGRAM(S): at 12:43

## 2019-08-19 RX ADMIN — ENOXAPARIN SODIUM 40 MILLIGRAM(S): 100 INJECTION SUBCUTANEOUS at 12:13

## 2019-08-19 RX ADMIN — Medication 650 MILLIGRAM(S): at 12:13

## 2019-08-19 RX ADMIN — Medication 1 MILLIGRAM(S): at 12:13

## 2019-08-19 NOTE — PROGRESS NOTE ADULT - PROBLEM SELECTOR PLAN 2
- Hx of suicide attempt in past - slitting of throat  - Hx of Bipolar and/or Schizoaffective Disorder  - recent suicidal and homicidal ideation  - Discharge planning Psych direction  - Tele-psych consult reviewed  - Luann f/u reviewed, called back this morning, ok to DC 1:1?  - maintain 1:1 for now  - Suicide Precautions  - Social Work for appropriate placement, not clear yet if he can return to [facility] (where he was) - Hx of suicide attempt in past - slitting of throat  - Hx of Bipolar and/or Schizoaffective Disorder  - recent suicidal and homicidal ideation  - Discharge planning Psych direction  - Tele-psych consult reviewed  - Suicide Precautions  - Social Work for appropriate placement, not clear yet if he can return to [facility] (where he was)

## 2019-08-20 ENCOUNTER — TRANSCRIPTION ENCOUNTER (OUTPATIENT)
Age: 69
End: 2019-08-20

## 2019-08-20 VITALS
TEMPERATURE: 98 F | OXYGEN SATURATION: 95 % | RESPIRATION RATE: 16 BRPM | DIASTOLIC BLOOD PRESSURE: 82 MMHG | SYSTOLIC BLOOD PRESSURE: 151 MMHG | HEART RATE: 82 BPM

## 2019-08-20 LAB
ANION GAP SERPL CALC-SCNC: 8 MMOL/L — SIGNIFICANT CHANGE UP (ref 5–17)
BUN SERPL-MCNC: 15 MG/DL — SIGNIFICANT CHANGE UP (ref 7–23)
CALCIUM SERPL-MCNC: 8.8 MG/DL — SIGNIFICANT CHANGE UP (ref 8.4–10.5)
CHLORIDE SERPL-SCNC: 104 MMOL/L — SIGNIFICANT CHANGE UP (ref 96–108)
CO2 SERPL-SCNC: 23 MMOL/L — SIGNIFICANT CHANGE UP (ref 22–31)
CREAT SERPL-MCNC: 0.77 MG/DL — SIGNIFICANT CHANGE UP (ref 0.5–1.3)
GLUCOSE SERPL-MCNC: 86 MG/DL — SIGNIFICANT CHANGE UP (ref 70–99)
HCT VFR BLD CALC: 49.9 % — SIGNIFICANT CHANGE UP (ref 39–50)
HGB BLD-MCNC: 16.3 G/DL — SIGNIFICANT CHANGE UP (ref 13–17)
MCHC RBC-ENTMCNC: 30.6 PG — SIGNIFICANT CHANGE UP (ref 27–34)
MCHC RBC-ENTMCNC: 32.7 GM/DL — SIGNIFICANT CHANGE UP (ref 32–36)
MCV RBC AUTO: 93.8 FL — SIGNIFICANT CHANGE UP (ref 80–100)
NRBC # BLD: 0 /100 WBCS — SIGNIFICANT CHANGE UP (ref 0–0)
PLATELET # BLD AUTO: 223 K/UL — SIGNIFICANT CHANGE UP (ref 150–400)
POTASSIUM SERPL-MCNC: 4.1 MMOL/L — SIGNIFICANT CHANGE UP (ref 3.5–5.3)
POTASSIUM SERPL-SCNC: 4.1 MMOL/L — SIGNIFICANT CHANGE UP (ref 3.5–5.3)
RBC # BLD: 5.32 M/UL — SIGNIFICANT CHANGE UP (ref 4.2–5.8)
RBC # FLD: 14.1 % — SIGNIFICANT CHANGE UP (ref 10.3–14.5)
SODIUM SERPL-SCNC: 135 MMOL/L — SIGNIFICANT CHANGE UP (ref 135–145)
WBC # BLD: 8.81 K/UL — SIGNIFICANT CHANGE UP (ref 3.8–10.5)
WBC # FLD AUTO: 8.81 K/UL — SIGNIFICANT CHANGE UP (ref 3.8–10.5)

## 2019-08-20 PROCEDURE — 99239 HOSP IP/OBS DSCHRG MGMT >30: CPT

## 2019-08-20 RX ORDER — FOLIC ACID 0.8 MG
1 TABLET ORAL
Qty: 0 | Refills: 0 | DISCHARGE
Start: 2019-08-20

## 2019-08-20 RX ORDER — DIVALPROEX SODIUM 500 MG/1
6 TABLET, DELAYED RELEASE ORAL
Qty: 0 | Refills: 0 | DISCHARGE
Start: 2019-08-20

## 2019-08-20 RX ORDER — DOCUSATE SODIUM 100 MG
2 CAPSULE ORAL
Qty: 0 | Refills: 0 | DISCHARGE
Start: 2019-08-20

## 2019-08-20 RX ORDER — LATANOPROST 0.05 MG/ML
1 SOLUTION/ DROPS OPHTHALMIC; TOPICAL
Qty: 0 | Refills: 0 | DISCHARGE
Start: 2019-08-20

## 2019-08-20 RX ORDER — LAMOTRIGINE 25 MG/1
1 TABLET, ORALLY DISINTEGRATING ORAL
Qty: 0 | Refills: 0 | DISCHARGE
Start: 2019-08-20

## 2019-08-20 RX ORDER — AMLODIPINE BESYLATE 2.5 MG/1
1 TABLET ORAL
Qty: 0 | Refills: 0 | DISCHARGE
Start: 2019-08-20

## 2019-08-20 RX ORDER — CEFPODOXIME PROXETIL 100 MG
1 TABLET ORAL
Qty: 10 | Refills: 0
Start: 2019-08-20 | End: 2019-08-24

## 2019-08-20 RX ORDER — QUETIAPINE FUMARATE 200 MG/1
1 TABLET, FILM COATED ORAL
Qty: 0 | Refills: 0 | DISCHARGE
Start: 2019-08-20

## 2019-08-20 RX ORDER — QUETIAPINE FUMARATE 200 MG/1
25 TABLET, FILM COATED ORAL AT BEDTIME
Refills: 0 | Status: DISCONTINUED | OUTPATIENT
Start: 2019-08-20 | End: 2019-08-20

## 2019-08-20 RX ORDER — QUETIAPINE FUMARATE 200 MG/1
2 TABLET, FILM COATED ORAL
Qty: 0 | Refills: 0 | DISCHARGE
Start: 2019-08-20

## 2019-08-20 RX ADMIN — Medication 100 MILLIGRAM(S): at 11:37

## 2019-08-20 RX ADMIN — LAMOTRIGINE 100 MILLIGRAM(S): 25 TABLET, ORALLY DISINTEGRATING ORAL at 06:30

## 2019-08-20 RX ADMIN — DIVALPROEX SODIUM 750 MILLIGRAM(S): 500 TABLET, DELAYED RELEASE ORAL at 06:30

## 2019-08-20 RX ADMIN — PANTOPRAZOLE SODIUM 40 MILLIGRAM(S): 20 TABLET, DELAYED RELEASE ORAL at 06:30

## 2019-08-20 RX ADMIN — Medication 1 MILLIGRAM(S): at 11:37

## 2019-08-20 RX ADMIN — LAMOTRIGINE 100 MILLIGRAM(S): 25 TABLET, ORALLY DISINTEGRATING ORAL at 17:04

## 2019-08-20 RX ADMIN — DIVALPROEX SODIUM 750 MILLIGRAM(S): 500 TABLET, DELAYED RELEASE ORAL at 17:04

## 2019-08-20 RX ADMIN — AMLODIPINE BESYLATE 5 MILLIGRAM(S): 2.5 TABLET ORAL at 06:30

## 2019-08-20 RX ADMIN — CEFTRIAXONE 100 MILLIGRAM(S): 500 INJECTION, POWDER, FOR SOLUTION INTRAMUSCULAR; INTRAVENOUS at 16:39

## 2019-08-20 RX ADMIN — Medication 50 MILLIGRAM(S): at 06:30

## 2019-08-20 RX ADMIN — ENOXAPARIN SODIUM 40 MILLIGRAM(S): 100 INJECTION SUBCUTANEOUS at 11:37

## 2019-08-20 RX ADMIN — Medication 50 MILLIGRAM(S): at 14:33

## 2019-08-20 NOTE — PROGRESS NOTE ADULT - PROBLEM SELECTOR PROBLEM 2
Suicidal ideation

## 2019-08-20 NOTE — PROGRESS NOTE ADULT - ASSESSMENT
67 y/o M with PMH of HTN, TBI, Normal Pressure Hydrocephalus with shunt, GERD, Glaucoma, and Bipolar disorder sent from rehab facility for hostility & suicidal ideations.
69 y/o M with PMH of HTN, TBI, Normal Pressure Hydrocephalus with shunt, GERD, Glaucoma, and Bipolar disorder sent from rehab facility for hostility & suicidal ideations.
69 y/o M with PMH of HTN, TBI, Normal Pressure Hydrocephalus with shunt, GERD, Glaucoma, and Bipolar disorder sent from rehab facility for hostility & suicidal ideations.
67 y/o M with PMH of HTN, TBI, Normal Pressure Hydrocephalus with shunt, GERD, Glaucoma, and Bipolar disorder sent from rehab facility for hostility & suicidal ideations.

## 2019-08-20 NOTE — PROGRESS NOTE BEHAVIORAL HEALTH - NSBHCHARTREVIEWLAB_PSY_A_CORE FT
16.0   9.53  )-----------( 256      ( 15 Aug 2019 08:01 )             47.3   08-14    139  |  105  |  14  ----------------------------<  97  3.7   |  31  |  0.88    Ca    9.3      14 Aug 2019 16:15    TPro  7.1  /  Alb  3.5  /  TBili  0.3  /  DBili  x   /  AST  7<L>  /  ALT  12  /  AlkPhos  98  08-14

## 2019-08-20 NOTE — PROGRESS NOTE BEHAVIORAL HEALTH - RISK ASSESSMENT
Acute Suicide Risk  (  ) High   (  ) Moderate   ( x) Low   (  ) Unable to determine   Rationale ___reporting recent suicidal ideation with hx of SA________     Elevated Chronic Risk   (  x) Yes _chronic mental illness, hx of self harm, impulsive, dementia, poor coping skills__________  Details ___________  (  ) No   ___________

## 2019-08-20 NOTE — PROGRESS NOTE ADULT - PROBLEM SELECTOR PLAN 3
Controlled, will continue Hydralazine & Amlodipine with hold parameters.

## 2019-08-20 NOTE — PROGRESS NOTE BEHAVIORAL HEALTH - NSBHCONSULTFOLLOWAFTERCARE_PSY_A_CORE FT
pt may be seen by the psychiatrist at  his residence.
pt may be seen by the psychiatrist at  his residence.

## 2019-08-20 NOTE — DISCHARGE NOTE NURSING/CASE MANAGEMENT/SOCIAL WORK - NSDCDPATPORTLINK_GEN_ALL_CORE
You can access the [facility], offered by [facility], by registering with the following website: [email or web address]

## 2019-08-20 NOTE — PROGRESS NOTE ADULT - PROBLEM SELECTOR PROBLEM 1
Urinary tract infection without hematuria, site unspecified
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
## 2019-08-20 NOTE — PROGRESS NOTE ADULT - REASON FOR ADMISSION
Hostility & suicidal ideations.

## 2019-08-20 NOTE — PROGRESS NOTE ADULT - PROBLEM SELECTOR PLAN 7
**IMPROVE score of 2, started him on LMWH 40 mg sub Q daily for DVT prophylaxis.

## 2019-08-20 NOTE — PROGRESS NOTE ADULT - PROBLEM SELECTOR PLAN 5
on Prilosec that is non formulary, continue on Protonix 40 mg PO daily instead.

## 2019-08-20 NOTE — PROGRESS NOTE ADULT - PROBLEM SELECTOR PLAN 1
- Rocephin  - UC never sent so will switch to PO Vantin on discharge
- Rocephin  - UC never sent so will switch to PO Vantin on discharge
- Rocephin  - check urine culture
- Rocephin  - check urine culture
PO Vantin x 2 more days
- Rocephin  - check urine culture

## 2019-08-20 NOTE — PROGRESS NOTE BEHAVIORAL HEALTH - NSBHFUPINTERVALCCFT_PSY_A_CORE
" I can't remember anything, I don't think I have a place to stay."
" I can't remember anything, I don't remember seeing you before."
" I'm fine."

## 2019-08-20 NOTE — PROGRESS NOTE BEHAVIORAL HEALTH - AXIS III
TBI, NPH with shunt, hx of TBI, hx of encephalopathy, HTN, dementia with behavioral disturbance

## 2019-08-20 NOTE — PROGRESS NOTE ADULT - SUBJECTIVE AND OBJECTIVE BOX
INTERVAL HPI/OVERNIGHT EVENTS:   Patient would not answer questions and refused physical exam.    REVIEW OF SYSTEMS:  Patient would not answer questions    PHYSICAL EXAM:  Vital Signs Last 24 Hrs  T(C): 36.5 (16 Aug 2019 05:00), Max: 36.7 (15 Aug 2019 14:41)  T(F): 97.7 (16 Aug 2019 05:00), Max: 98 (15 Aug 2019 14:41)  HR: 82 (16 Aug 2019 05:00) (78 - 84)  BP: 111/73 (16 Aug 2019 05:00) (111/73 - 130/77)  BP(mean): --  RR: 16 (16 Aug 2019 05:00) (16 - 16)  SpO2: 95% (16 Aug 2019 05:00) (95% - 97%)    GENERAL:  Was sleeping, awakened briefly, told me to "leave him alone", refused exam    Diagnostic Testing:                     Hepatitis C Antibody Test (08.15.19 @ 11:33)    Hepatitis C Virus S/CO Ratio: 0.21 S/CO    Hepatitis C Virus Interpretation: Nonreact: Hepatitis C AB  S/CO Ratio                        Interpretation  < 1.00                                   Non-Reactive  1.00 - 4.99                         Weakly-Reactive  >= 5.00                                Reactive  Non-Reactive: A person witha non-reactive HCV antibody result is  considered uninfected.  No further action is needed unless recent  infection is suspected.  In these cases, consider repeat testing later to  detect seroconversion..  Weakly-Reactive: HCV antibody test is abnormal, HCV RNA Qualitative test  will follow.  Reactive: HCV antibody test is abnormal, HCV RNA Qualitative test will  follow.  Note: HCV antibody testing is performed on the [employer] system.
INTERVAL HPI/OVERNIGHT EVENTS:   Patient would not answer questions and refused physical exam.  "I just want to get the hell out of here"    REVIEW OF SYSTEMS:  See HPI,  all others negative    PHYSICAL EXAM:  Vital Signs Last 24 Hrs  T(C): 36.4 (15 Aug 2019 06:28), Max: 36.8 (14 Aug 2019 15:46)  T(F): 97.6 (15 Aug 2019 06:28), Max: 98.2 (14 Aug 2019 15:46)  HR: 76 (15 Aug 2019 06:28) (73 - 90)  BP: 122/73 (15 Aug 2019 06:28) (122/73 - 164/73)  BP(mean): --  RR: 19 (15 Aug 2019 06:28) (18 - 19)  SpO2: 96% (15 Aug 2019 06:28) (95% - 99%)    GENERAL:  Refused exam, awake alert, agitated, lying in bed, moving all  4 extremities, 1:1 at bedside    Diagnostic Testin.0   9.53  )-----------( 256      ( 15 Aug 2019 08:01 )             47.3     14 Aug 2019 16:15    139    |  105    |  14     ----------------------------<  97     3.7     |  31     |  0.88     Ca    9.3        14 Aug 2019 16:15    TPro  7.1    /  Alb  3.5    /  TBili  0.3    /  DBili  x      /  AST  7      /  ALT  12     /  AlkPhos  98     14 Aug 2019 16:15      Urinalysis Basic - ( 14 Aug 2019 16:58 )    Color: Yellow / Appearance: Turbid / S.010 / pH: x  Gluc: x / Ketone: Trace  / Bili: Negative / Urobili: Negative mg/dL   Blood: x / Protein: Negative mg/dL / Nitrite: Positive   Leuk Esterase: Moderate / RBC: x / WBC 11-25   Sq Epi: x / Non Sq Epi: Few / Bacteria: Many
Subjective: Doing well with no overnight events.     MEDICATIONS  (STANDING):  amLODIPine   Tablet 5 milliGRAM(s) Oral daily  cefTRIAXone   IVPB 1000 milliGRAM(s) IV Intermittent every 24 hours  diVALproex Sprinkle 750 milliGRAM(s) Oral every 12 hours  docusate sodium 100 milliGRAM(s) Oral daily  docusate sodium 200 milliGRAM(s) Oral at bedtime  enoxaparin Injectable 40 milliGRAM(s) SubCutaneous daily  folic acid 1 milliGRAM(s) Oral daily  hydrALAZINE 50 milliGRAM(s) Oral three times a day  lamoTRIgine 100 milliGRAM(s) Oral two times a day  latanoprost 0.005% Ophthalmic Solution 1 Drop(s) Both EYES at bedtime  pantoprazole    Tablet 40 milliGRAM(s) Oral before breakfast    MEDICATIONS  (PRN):  acetaminophen   Tablet .. 650 milliGRAM(s) Oral every 6 hours PRN Mild Pain (1 - 3)      Allergies    No Known Allergies    Intolerances        Vital Signs Last 24 Hrs  T(C): 36.6 (19 Aug 2019 05:30), Max: 36.7 (18 Aug 2019 17:41)  T(F): 97.9 (19 Aug 2019 05:30), Max: 98.1 (18 Aug 2019 17:41)  HR: 68 (19 Aug 2019 05:30) (58 - 85)  BP: 114/70 (19 Aug 2019 05:30) (108/58 - 145/80)  BP(mean): --  RR: 18 (19 Aug 2019 05:30) (18 - 19)  SpO2: 93% (19 Aug 2019 05:30) (93% - 97%)    PHYSICAL EXAM:  GENERAL: NAD, well-groomed, well-developed  HEAD:  Atraumatic, Normocephalic  ENMT: Moist mucous membranes,   NECK: Supple, No JVD, Normal thyroid  NERVOUS SYSTEM:  All 4 extremities mobile, no gross sensory deficits.   CHEST/LUNG: Clear to auscultation bilaterally; No rales, rhonchi, wheezing, or rubs  HEART: Regular rate and rhythm; No murmurs, rubs, or gallops  ABDOMEN: Soft, Nontender, Nondistended; Bowel sounds present  EXTREMITIES:  2+ Peripheral Pulses, No clubbing, cyanosis, or edema      LABS:              CAPILLARY BLOOD GLUCOSE          RADIOLOGY & ADDITIONAL TESTS:    Imaging Personally Reviewed:  [ ] YES     Consultant(s) Notes Reviewed:      Care Discussed with Consultants/Other Providers:    Advanced Directives: [ ] DNR  [ ] No feeding tube  [ ] MOLST in chart  [ ] MOLST completed today  [ ] Unknown
Subjective: No overnight events and doing well.  Waiting for psych for dispo.     MEDICATIONS  (STANDING):  amLODIPine   Tablet 5 milliGRAM(s) Oral daily  cefTRIAXone   IVPB 1000 milliGRAM(s) IV Intermittent every 24 hours  diVALproex Sprinkle 750 milliGRAM(s) Oral every 12 hours  docusate sodium 100 milliGRAM(s) Oral daily  docusate sodium 200 milliGRAM(s) Oral at bedtime  enoxaparin Injectable 40 milliGRAM(s) SubCutaneous daily  folic acid 1 milliGRAM(s) Oral daily  hydrALAZINE 50 milliGRAM(s) Oral three times a day  lamoTRIgine 100 milliGRAM(s) Oral two times a day  latanoprost 0.005% Ophthalmic Solution 1 Drop(s) Both EYES at bedtime  pantoprazole    Tablet 40 milliGRAM(s) Oral before breakfast    MEDICATIONS  (PRN):  acetaminophen   Tablet .. 650 milliGRAM(s) Oral every 6 hours PRN Mild Pain (1 - 3)      Allergies    No Known Allergies    Intolerances        Vital Signs Last 24 Hrs  T(C): 36.8 (20 Aug 2019 09:00), Max: 36.9 (19 Aug 2019 17:17)  T(F): 98.3 (20 Aug 2019 09:00), Max: 98.5 (19 Aug 2019 17:17)  HR: 80 (20 Aug 2019 09:00) (71 - 85)  BP: 126/78 (20 Aug 2019 09:00) (120/67 - 164/81)  BP(mean): --  RR: 18 (20 Aug 2019 09:00) (18 - 19)  SpO2: 95% (20 Aug 2019 09:00) (92% - 96%)    PHYSICAL EXAM:  GENERAL: NAD, well-groomed, well-developed  HEAD:  Atraumatic, Normocephalic  ENMT: Moist mucous membranes,   NECK: Supple, No JVD, Normal thyroid  NERVOUS SYSTEM:  All 4 extremities mobile, no gross sensory deficits.   CHEST/LUNG: Clear to auscultation bilaterally; No rales, rhonchi, wheezing, or rubs  HEART: Regular rate and rhythm; No murmurs, rubs, or gallops  ABDOMEN: Soft, Nontender, Nondistended; Bowel sounds present  EXTREMITIES:  2+ Peripheral Pulses, No clubbing, cyanosis, or edema      LABS:              CAPILLARY BLOOD GLUCOSE          RADIOLOGY & ADDITIONAL TESTS:    Imaging Personally Reviewed:  [ ] YES     Consultant(s) Notes Reviewed:      Care Discussed with Consultants/Other Providers:    Advanced Directives: [ ] DNR  [ ] No feeding tube  [ ] MOLST in chart  [ ] MOLST completed today  [ ] Unknown
Subjective: No overnight events and no more dysuria     MEDICATIONS  (STANDING):  amLODIPine   Tablet 5 milliGRAM(s) Oral daily  cefTRIAXone   IVPB 1000 milliGRAM(s) IV Intermittent every 24 hours  diVALproex Sprinkle 750 milliGRAM(s) Oral every 12 hours  docusate sodium 100 milliGRAM(s) Oral daily  docusate sodium 200 milliGRAM(s) Oral at bedtime  enoxaparin Injectable 40 milliGRAM(s) SubCutaneous daily  folic acid 1 milliGRAM(s) Oral daily  hydrALAZINE 50 milliGRAM(s) Oral three times a day  lamoTRIgine 100 milliGRAM(s) Oral two times a day  latanoprost 0.005% Ophthalmic Solution 1 Drop(s) Both EYES at bedtime  pantoprazole    Tablet 40 milliGRAM(s) Oral before breakfast    MEDICATIONS  (PRN):  acetaminophen   Tablet .. 650 milliGRAM(s) Oral every 6 hours PRN Mild Pain (1 - 3)      Allergies    No Known Allergies    Intolerances        Vital Signs Last 24 Hrs  T(C): 36.6 (19 Aug 2019 05:30), Max: 36.7 (18 Aug 2019 17:41)  T(F): 97.9 (19 Aug 2019 05:30), Max: 98.1 (18 Aug 2019 17:41)  HR: 68 (19 Aug 2019 05:30) (58 - 85)  BP: 114/70 (19 Aug 2019 05:30) (108/58 - 145/80)  BP(mean): --  RR: 18 (19 Aug 2019 05:30) (18 - 19)  SpO2: 93% (19 Aug 2019 05:30) (93% - 97%)    PHYSICAL EXAM:  GENERAL: NAD, well-groomed, well-developed  HEAD:  Atraumatic, Normocephalic  ENMT: Moist mucous membranes,   NECK: Supple, No JVD, Normal thyroid  NERVOUS SYSTEM:  All 4 extremities mobile, no gross sensory deficits.   CHEST/LUNG: Clear to auscultation bilaterally; No rales, rhonchi, wheezing, or rubs  HEART: Regular rate and rhythm; No murmurs, rubs, or gallops  ABDOMEN: Soft, Nontender, Nondistended; Bowel sounds present  EXTREMITIES:  2+ Peripheral Pulses, No clubbing, cyanosis, or edema      LABS:              CAPILLARY BLOOD GLUCOSE          RADIOLOGY & ADDITIONAL TESTS:    Imaging Personally Reviewed:  [ ] YES     Consultant(s) Notes Reviewed:      Care Discussed with Consultants/Other Providers:    Advanced Directives: [ ] DNR  [ ] No feeding tube  [ ] MOLST in chart  [ ] MOLST completed today  [ ] Unknown
INTERVAL HPI/OVERNIGHT EVENTS:   Patient seen and examined. cont 1:1, still belligernat    MEDICATIONS  (STANDING):  amLODIPine   Tablet 5 milliGRAM(s) Oral daily  cefTRIAXone   IVPB 1000 milliGRAM(s) IV Intermittent every 24 hours  diVALproex Sprinkle 750 milliGRAM(s) Oral every 12 hours  docusate sodium 100 milliGRAM(s) Oral daily  docusate sodium 200 milliGRAM(s) Oral at bedtime  enoxaparin Injectable 40 milliGRAM(s) SubCutaneous daily  folic acid 1 milliGRAM(s) Oral daily  hydrALAZINE 50 milliGRAM(s) Oral three times a day  lamoTRIgine 100 milliGRAM(s) Oral two times a day  latanoprost 0.005% Ophthalmic Solution 1 Drop(s) Both EYES at bedtime  pantoprazole    Tablet 40 milliGRAM(s) Oral before breakfast    MEDICATIONS  (PRN):  acetaminophen   Tablet .. 650 milliGRAM(s) Oral every 6 hours PRN Mild Pain (1 - 3)      REVIEW OF SYSTEMS:  See HPI,  all others negative    PHYSICAL EXAM:  Vital Signs Last 24 Hrs  T(C): 36.4 (17 Aug 2019 17:21), Max: 36.7 (17 Aug 2019 09:40)  T(F): 97.6 (17 Aug 2019 17:21), Max: 98.1 (17 Aug 2019 09:40)  HR: 75 (17 Aug 2019 17:21) (68 - 79)  BP: 110/65 (17 Aug 2019 17:21) (109/68 - 148/80)  BP(mean): --  RR: 19 (17 Aug 2019 17:21) (17 - 20)  SpO2: 97% (17 Aug 2019 17:21) (94% - 98%)    GENERAL: NAD,   EYES: EOMI, PERRLA, conjunctiva and sclera clear  ENMT: No tonsillar erythema, exudates, or enlargement; Moist mucous membranes, Good dentition, No lesions  NECK: Supple, No JVD, No Cervical LAD, No thyromegaly, No thyroid nodules felt  NERVOUS SYSTEM:  flat effect  CHEST WALL: No masses  CHEST/LUNG: Clear to auscultation bilaterally; No rales, rhonchi, wheezing, or rubs  HEART: Regular rate and rhythm; No murmurs, rubs, or gallops  ABDOMEN: Soft, Nontender, Nondistended, Bowel sounds present, No palpable masses or organomegaly, No bruits  EXTREMITIES:  2+ Peripheral Pulses, No clubbing, cyanosis, or edema  LYMPH: No lymphadenopathy  SKIN: No rashes or lesions    LABS:                 RADIOLOGY & ADDITIONAL TESTS:

## 2019-08-20 NOTE — DISCHARGE NOTE PROVIDER - NSDCCPCAREPLAN_GEN_ALL_CORE_FT
PRINCIPAL DISCHARGE DIAGNOSIS  Diagnosis: Agitation  Assessment and Plan of Treatment: Medications adjusted by Psychiatry.      SECONDARY DISCHARGE DIAGNOSES  Diagnosis: Urinary tract infection without hematuria, site unspecified  Assessment and Plan of Treatment: Complete Abx.    Diagnosis: Suicidal ideation  Assessment and Plan of Treatment: Was on 1:1 but after medication adjustement has done better and off for several days.

## 2019-08-20 NOTE — DISCHARGE NOTE PROVIDER - HOSPITAL COURSE
This is a 69 y/o M with PMH of HTN, TBI, Normal Pressure Hydrocephalus with shunt, GERD, Glaucoma, and Bipolar disorder who was sent from [facility] facility for hostility & suicidal ideations. As per staff there patient has been living at the facility fro deconditioning, poor mobility & dementia, he is difficult and verbally aggressive at baseline, and unable to care for himself. Over the past 2 weeks he has been notably more irritable after his roommate was discharged, making statements about staff at rehab such as "get me a gun I want to shoot them" and "I just want to slit their throats". Recently he started making more threats about suicidal ideation and homicidal ideation. Of note that patient has a history of a suicidal attempt in the past. At the ED he was found to have a UTI, was evaluated by a tele psych psychiatrist which requested medical admission for placement at a nursing home that is better suited to care for his complex needs.  Patient admitted to medicine and UTI was treated with IV Ceftriaxone and changed to PO Vantin for 3 additional days. He was seen by Psych, medications were adjusted and patient was discharged to SNF.                PHYSICAL EXAM:    Vital Signs Last 24 Hrs    T(C): 36.8 (20 Aug 2019 09:00), Max: 36.9 (19 Aug 2019 17:17)    T(F): 98.3 (20 Aug 2019 09:00), Max: 98.5 (19 Aug 2019 17:17)    HR: 80 (20 Aug 2019 09:00) (71 - 83)    BP: 126/78 (20 Aug 2019 09:00) (120/67 - 164/81)    BP(mean): --    RR: 18 (20 Aug 2019 09:00) (18 - 19)    SpO2: 95% (20 Aug 2019 09:00) (92% - 96%)        GENERAL: NAD, well-groomed, well-developed    HEAD:  Atraumatic, Normocephalic    EYES: EOMI, PERRLA, conjunctiva and sclera clear    ENMT: No tonsillar erythema, exudates, or enlargement; Moist mucous membranes, Good dentition, No lesions    NECK: Supple, No JVD, Normal thyroid    NERVOUS SYSTEM:  Alert & Oriented X3, Good concentration; Motor Strength 5/5 B/L upper and lower extremities; CHEST/LUNG: Clear to auscultation bilaterally; No rales, rhonchi, wheezing, or rubs    HEART: Regular rate and rhythm; No murmurs, rubs, or gallops    ABDOMEN: Soft, Nontender, Nondistended; Bowel sounds present    EXTREMITIES:  2+ Peripheral Pulses, No clubbing, cyanosis, or edema    LYMPH: No lymphadenopathy noted    SKIN: No rashes or lesions

## 2019-08-20 NOTE — PROGRESS NOTE BEHAVIORAL HEALTH - NSBHCONSULTMEDAGITATION_PSY_A_CORE FT
Ativan 1mg po/IM/IV q4hrs prn agitation

## 2019-08-20 NOTE — PROGRESS NOTE ADULT - PROBLEM SELECTOR PLAN 4
Continue on Depakote & Lamictal at same doses for now, psychiatry consult as stated above.

## 2019-08-20 NOTE — PROGRESS NOTE BEHAVIORAL HEALTH - NSBHCHARTREVIEWVS_PSY_A_CORE FT
Vital Signs Last 24 Hrs  T(C): 36.8 (20 Aug 2019 09:00), Max: 36.9 (19 Aug 2019 17:17)  T(F): 98.3 (20 Aug 2019 09:00), Max: 98.5 (19 Aug 2019 17:17)  HR: 80 (20 Aug 2019 09:00) (71 - 83)  BP: 126/78 (20 Aug 2019 09:00) (120/67 - 164/81)  BP(mean): --  RR: 18 (20 Aug 2019 09:00) (18 - 19)  SpO2: 95% (20 Aug 2019 09:00) (92% - 96%)
Vital Signs Last 24 Hrs  T(C): 36.7 (15 Aug 2019 14:41), Max: 36.8 (14 Aug 2019 15:46)  T(F): 98 (15 Aug 2019 14:41), Max: 98.2 (14 Aug 2019 15:46)  HR: 78 (15 Aug 2019 14:41) (73 - 90)  BP: 130/77 (15 Aug 2019 14:41) (122/73 - 164/73)  BP(mean): --  RR: 16 (15 Aug 2019 14:41) (16 - 19)  SpO2: 96% (15 Aug 2019 14:41) (95% - 99%)
Vital Signs Last 24 Hrs  T(C): 36.5 (16 Aug 2019 10:30), Max: 36.7 (15 Aug 2019 21:30)  T(F): 97.7 (16 Aug 2019 10:30), Max: 98 (15 Aug 2019 21:30)  HR: 70 (16 Aug 2019 10:30) (70 - 84)  BP: 109/66 (16 Aug 2019 10:30) (109/66 - 125/75)  BP(mean): --  RR: 17 (16 Aug 2019 10:30) (16 - 17)  SpO2: 95% (16 Aug 2019 10:30) (95% - 97%)
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
## 2019-08-20 NOTE — PROGRESS NOTE ADULT - PROBLEM SELECTOR PLAN 6
continue on  Xalatan eye drops (formulary for Travatan).

## 2019-08-20 NOTE — DIETITIAN INITIAL EVALUATION ADULT. - OTHER INFO
70yo male admitted from [facility] for hostility and SI.  Pt c hx bipolar disorder, TBI, HTN and GERD.  Pt seen for nutrition assessment per LOS policy.  Po intake generally good on DASH/TLC diet throughout admission per nursing documentation.  However, pt did not eat breakfast per NA; pt unable to state why.  States he is hungry now and is awaiting lunch.  Appears well nourished.  Skin intact.

## 2019-08-20 NOTE — PROGRESS NOTE ADULT - PROBLEM SELECTOR PROBLEM 4
Bipolar disorder

## 2019-08-20 NOTE — PROGRESS NOTE ADULT - PROBLEM SELECTOR PLAN 2
- Hx of suicide attempt in past - slitting of throat  - Hx of Bipolar and/or Schizoaffective Disorder  - recent suicidal and homicidal ideation  - Discharge planning Psych direction  - Tele-psych consult reviewed  - Suicide Precautions  - Social Work for appropriate placement, not clear yet if he can return to [facility] (where he was)

## 2019-08-20 NOTE — PROGRESS NOTE BEHAVIORAL HEALTH - NSBHCONSULTMEDS_PSY_A_CORE FT
Seroquel 25mg po qhs  Lamictal 100mg BID  Depakote 750mg BID  Ativan 1mg po/IM/IV q4hrs prn agitation
Pt may continue  Lamictal 100mg BID  Depkote 750mg BID  Ativan 1mg po/IM/IV q4hrs prn agitation
Pt may continue  Lamictal 100mg BID  Depkote 750mg BID  Ativan 1mg po/IM/IV q4hrs prn agitation

## 2019-09-19 PROCEDURE — 36415 COLL VENOUS BLD VENIPUNCTURE: CPT

## 2019-09-19 PROCEDURE — 97116 GAIT TRAINING THERAPY: CPT

## 2019-09-19 PROCEDURE — 93005 ELECTROCARDIOGRAM TRACING: CPT

## 2019-09-19 PROCEDURE — 99285 EMERGENCY DEPT VISIT HI MDM: CPT | Mod: 25

## 2019-09-19 PROCEDURE — 97161 PT EVAL LOW COMPLEX 20 MIN: CPT

## 2019-09-19 PROCEDURE — 86803 HEPATITIS C AB TEST: CPT

## 2019-09-19 PROCEDURE — 80053 COMPREHEN METABOLIC PANEL: CPT

## 2019-09-19 PROCEDURE — 71045 X-RAY EXAM CHEST 1 VIEW: CPT

## 2019-09-19 PROCEDURE — 97530 THERAPEUTIC ACTIVITIES: CPT

## 2019-09-19 PROCEDURE — 85027 COMPLETE CBC AUTOMATED: CPT

## 2019-09-19 PROCEDURE — 81001 URINALYSIS AUTO W/SCOPE: CPT

## 2019-09-19 PROCEDURE — 80048 BASIC METABOLIC PNL TOTAL CA: CPT

## 2019-09-19 PROCEDURE — 80307 DRUG TEST PRSMV CHEM ANLYZR: CPT

## 2019-09-19 PROCEDURE — 96372 THER/PROPH/DIAG INJ SC/IM: CPT

## 2019-09-19 PROCEDURE — 84443 ASSAY THYROID STIM HORMONE: CPT

## 2020-01-19 ENCOUNTER — EMERGENCY (EMERGENCY)
Facility: HOSPITAL | Age: 70
LOS: 1 days | Discharge: HOME | End: 2020-01-19
Attending: EMERGENCY MEDICINE | Admitting: EMERGENCY MEDICINE
Payer: MEDICARE

## 2020-01-19 ENCOUNTER — INPATIENT (INPATIENT)
Facility: HOSPITAL | Age: 70
LOS: 2 days | Discharge: NURSING, REHAB OR LONG-TERM CARE | DRG: 884 | End: 2020-01-22
Attending: INTERNAL MEDICINE | Admitting: INTERNAL MEDICINE
Payer: MEDICARE

## 2020-01-19 VITALS
OXYGEN SATURATION: 90 % | SYSTOLIC BLOOD PRESSURE: 93 MMHG | RESPIRATION RATE: 18 BRPM | DIASTOLIC BLOOD PRESSURE: 61 MMHG | HEART RATE: 67 BPM | WEIGHT: 160.06 LBS | TEMPERATURE: 98 F

## 2020-01-19 VITALS
HEART RATE: 88 BPM | DIASTOLIC BLOOD PRESSURE: 79 MMHG | SYSTOLIC BLOOD PRESSURE: 124 MMHG | RESPIRATION RATE: 16 BRPM | OXYGEN SATURATION: 98 %

## 2020-01-19 VITALS
RESPIRATION RATE: 18 BRPM | OXYGEN SATURATION: 97 % | SYSTOLIC BLOOD PRESSURE: 155 MMHG | HEART RATE: 88 BPM | DIASTOLIC BLOOD PRESSURE: 94 MMHG | WEIGHT: 159.61 LBS

## 2020-01-19 DIAGNOSIS — Z98.890 OTHER SPECIFIED POSTPROCEDURAL STATES: Chronic | ICD-10-CM

## 2020-01-19 DIAGNOSIS — R41.82 ALTERED MENTAL STATUS, UNSPECIFIED: ICD-10-CM

## 2020-01-19 LAB
ALBUMIN SERPL ELPH-MCNC: 3.3 G/DL — SIGNIFICANT CHANGE UP (ref 3.3–5)
ALBUMIN SERPL ELPH-MCNC: 3.5 G/DL — SIGNIFICANT CHANGE UP (ref 3.3–5)
ALP SERPL-CCNC: 80 U/L — SIGNIFICANT CHANGE UP (ref 40–120)
ALP SERPL-CCNC: 84 U/L — SIGNIFICANT CHANGE UP (ref 40–120)
ALT FLD-CCNC: 12 U/L — SIGNIFICANT CHANGE UP (ref 10–45)
ALT FLD-CCNC: 8 U/L — LOW (ref 10–45)
AMMONIA BLD-MCNC: 23 UMOL/L — SIGNIFICANT CHANGE UP (ref 11–55)
ANION GAP SERPL CALC-SCNC: 10 MMOL/L — SIGNIFICANT CHANGE UP (ref 5–17)
ANION GAP SERPL CALC-SCNC: 11 MMOL/L — SIGNIFICANT CHANGE UP (ref 5–17)
APTT BLD: 30.7 SEC — SIGNIFICANT CHANGE UP (ref 27.5–36.3)
AST SERPL-CCNC: 8 U/L — LOW (ref 10–40)
AST SERPL-CCNC: 8 U/L — LOW (ref 10–40)
BASOPHILS # BLD AUTO: 0.03 K/UL — SIGNIFICANT CHANGE UP (ref 0–0.2)
BASOPHILS # BLD AUTO: 0.05 K/UL — SIGNIFICANT CHANGE UP (ref 0–0.2)
BASOPHILS NFR BLD AUTO: 0.3 % — SIGNIFICANT CHANGE UP (ref 0–2)
BASOPHILS NFR BLD AUTO: 0.4 % — SIGNIFICANT CHANGE UP (ref 0–2)
BILIRUB SERPL-MCNC: 0.4 MG/DL — SIGNIFICANT CHANGE UP (ref 0.2–1.2)
BILIRUB SERPL-MCNC: 0.4 MG/DL — SIGNIFICANT CHANGE UP (ref 0.2–1.2)
BUN SERPL-MCNC: 17 MG/DL — SIGNIFICANT CHANGE UP (ref 7–23)
BUN SERPL-MCNC: 19 MG/DL — SIGNIFICANT CHANGE UP (ref 7–23)
CALCIUM SERPL-MCNC: 8.5 MG/DL — SIGNIFICANT CHANGE UP (ref 8.4–10.5)
CALCIUM SERPL-MCNC: 8.7 MG/DL — SIGNIFICANT CHANGE UP (ref 8.4–10.5)
CHLORIDE SERPL-SCNC: 106 MMOL/L — SIGNIFICANT CHANGE UP (ref 96–108)
CHLORIDE SERPL-SCNC: 108 MMOL/L — SIGNIFICANT CHANGE UP (ref 96–108)
CO2 SERPL-SCNC: 23 MMOL/L — SIGNIFICANT CHANGE UP (ref 22–31)
CO2 SERPL-SCNC: 28 MMOL/L — SIGNIFICANT CHANGE UP (ref 22–31)
CREAT SERPL-MCNC: 0.85 MG/DL — SIGNIFICANT CHANGE UP (ref 0.5–1.3)
CREAT SERPL-MCNC: 1.2 MG/DL — SIGNIFICANT CHANGE UP (ref 0.5–1.3)
EOSINOPHIL # BLD AUTO: 0.09 K/UL — SIGNIFICANT CHANGE UP (ref 0–0.5)
EOSINOPHIL # BLD AUTO: 0.13 K/UL — SIGNIFICANT CHANGE UP (ref 0–0.5)
EOSINOPHIL NFR BLD AUTO: 0.8 % — SIGNIFICANT CHANGE UP (ref 0–6)
EOSINOPHIL NFR BLD AUTO: 1 % — SIGNIFICANT CHANGE UP (ref 0–6)
GLUCOSE SERPL-MCNC: 101 MG/DL — HIGH (ref 70–99)
GLUCOSE SERPL-MCNC: 93 MG/DL — SIGNIFICANT CHANGE UP (ref 70–99)
HCT VFR BLD CALC: 46.2 % — SIGNIFICANT CHANGE UP (ref 39–50)
HCT VFR BLD CALC: 47.6 % — SIGNIFICANT CHANGE UP (ref 39–50)
HGB BLD-MCNC: 15.3 G/DL — SIGNIFICANT CHANGE UP (ref 13–17)
HGB BLD-MCNC: 15.8 G/DL — SIGNIFICANT CHANGE UP (ref 13–17)
IMM GRANULOCYTES NFR BLD AUTO: 0.5 % — SIGNIFICANT CHANGE UP (ref 0–1.5)
IMM GRANULOCYTES NFR BLD AUTO: 0.7 % — SIGNIFICANT CHANGE UP (ref 0–1.5)
INR BLD: 1.14 RATIO — SIGNIFICANT CHANGE UP (ref 0.88–1.16)
LACTATE SERPL-SCNC: 1.6 MMOL/L — SIGNIFICANT CHANGE UP (ref 0.7–2)
LACTATE SERPL-SCNC: 2.1 MMOL/L — HIGH (ref 0.7–2)
LYMPHOCYTES # BLD AUTO: 0.94 K/UL — LOW (ref 1–3.3)
LYMPHOCYTES # BLD AUTO: 1.35 K/UL — SIGNIFICANT CHANGE UP (ref 1–3.3)
LYMPHOCYTES # BLD AUTO: 11.7 % — LOW (ref 13–44)
LYMPHOCYTES # BLD AUTO: 7.3 % — LOW (ref 13–44)
MCHC RBC-ENTMCNC: 30.7 PG — SIGNIFICANT CHANGE UP (ref 27–34)
MCHC RBC-ENTMCNC: 30.9 PG — SIGNIFICANT CHANGE UP (ref 27–34)
MCHC RBC-ENTMCNC: 33.1 GM/DL — SIGNIFICANT CHANGE UP (ref 32–36)
MCHC RBC-ENTMCNC: 33.2 GM/DL — SIGNIFICANT CHANGE UP (ref 32–36)
MCV RBC AUTO: 92.8 FL — SIGNIFICANT CHANGE UP (ref 80–100)
MCV RBC AUTO: 93 FL — SIGNIFICANT CHANGE UP (ref 80–100)
MONOCYTES # BLD AUTO: 0.31 K/UL — SIGNIFICANT CHANGE UP (ref 0–0.9)
MONOCYTES # BLD AUTO: 0.45 K/UL — SIGNIFICANT CHANGE UP (ref 0–0.9)
MONOCYTES NFR BLD AUTO: 2.7 % — SIGNIFICANT CHANGE UP (ref 2–14)
MONOCYTES NFR BLD AUTO: 3.5 % — SIGNIFICANT CHANGE UP (ref 2–14)
NEUTROPHILS # BLD AUTO: 11.13 K/UL — HIGH (ref 1.8–7.4)
NEUTROPHILS # BLD AUTO: 9.7 K/UL — HIGH (ref 1.8–7.4)
NEUTROPHILS NFR BLD AUTO: 84 % — HIGH (ref 43–77)
NEUTROPHILS NFR BLD AUTO: 87.1 % — HIGH (ref 43–77)
NRBC # BLD: 0 /100 WBCS — SIGNIFICANT CHANGE UP (ref 0–0)
NRBC # BLD: 0 /100 WBCS — SIGNIFICANT CHANGE UP (ref 0–0)
PLATELET # BLD AUTO: 214 K/UL — SIGNIFICANT CHANGE UP (ref 150–400)
PLATELET # BLD AUTO: 218 K/UL — SIGNIFICANT CHANGE UP (ref 150–400)
POTASSIUM SERPL-MCNC: 4 MMOL/L — SIGNIFICANT CHANGE UP (ref 3.5–5.3)
POTASSIUM SERPL-MCNC: 4.3 MMOL/L — SIGNIFICANT CHANGE UP (ref 3.5–5.3)
POTASSIUM SERPL-SCNC: 4 MMOL/L — SIGNIFICANT CHANGE UP (ref 3.5–5.3)
POTASSIUM SERPL-SCNC: 4.3 MMOL/L — SIGNIFICANT CHANGE UP (ref 3.5–5.3)
PROT SERPL-MCNC: 6.2 G/DL — SIGNIFICANT CHANGE UP (ref 6–8.3)
PROT SERPL-MCNC: 6.8 G/DL — SIGNIFICANT CHANGE UP (ref 6–8.3)
PROTHROM AB SERPL-ACNC: 12.8 SEC — SIGNIFICANT CHANGE UP (ref 10–12.9)
RBC # BLD: 4.98 M/UL — SIGNIFICANT CHANGE UP (ref 4.2–5.8)
RBC # BLD: 5.12 M/UL — SIGNIFICANT CHANGE UP (ref 4.2–5.8)
RBC # FLD: 14.1 % — SIGNIFICANT CHANGE UP (ref 10.3–14.5)
RBC # FLD: 14.2 % — SIGNIFICANT CHANGE UP (ref 10.3–14.5)
SODIUM SERPL-SCNC: 142 MMOL/L — SIGNIFICANT CHANGE UP (ref 135–145)
SODIUM SERPL-SCNC: 144 MMOL/L — SIGNIFICANT CHANGE UP (ref 135–145)
T PALLIDUM AB TITR SER: NEGATIVE — SIGNIFICANT CHANGE UP
TROPONIN I SERPL-MCNC: <.017 NG/ML — LOW (ref 0.02–0.06)
WBC # BLD: 11.54 K/UL — HIGH (ref 3.8–10.5)
WBC # BLD: 12.79 K/UL — HIGH (ref 3.8–10.5)
WBC # FLD AUTO: 11.54 K/UL — HIGH (ref 3.8–10.5)
WBC # FLD AUTO: 12.79 K/UL — HIGH (ref 3.8–10.5)

## 2020-01-19 PROCEDURE — 87040 BLOOD CULTURE FOR BACTERIA: CPT

## 2020-01-19 PROCEDURE — 84484 ASSAY OF TROPONIN QUANT: CPT

## 2020-01-19 PROCEDURE — 70450 CT HEAD/BRAIN W/O DYE: CPT | Mod: 26

## 2020-01-19 PROCEDURE — 99285 EMERGENCY DEPT VISIT HI MDM: CPT | Mod: GC

## 2020-01-19 PROCEDURE — 80053 COMPREHEN METABOLIC PANEL: CPT

## 2020-01-19 PROCEDURE — 85610 PROTHROMBIN TIME: CPT

## 2020-01-19 PROCEDURE — 93005 ELECTROCARDIOGRAM TRACING: CPT

## 2020-01-19 PROCEDURE — 99285 EMERGENCY DEPT VISIT HI MDM: CPT | Mod: 25

## 2020-01-19 PROCEDURE — 83605 ASSAY OF LACTIC ACID: CPT

## 2020-01-19 PROCEDURE — 93010 ELECTROCARDIOGRAM REPORT: CPT

## 2020-01-19 PROCEDURE — 70450 CT HEAD/BRAIN W/O DYE: CPT

## 2020-01-19 PROCEDURE — 85730 THROMBOPLASTIN TIME PARTIAL: CPT

## 2020-01-19 PROCEDURE — 99285 EMERGENCY DEPT VISIT HI MDM: CPT

## 2020-01-19 PROCEDURE — 85027 COMPLETE CBC AUTOMATED: CPT

## 2020-01-19 PROCEDURE — 71045 X-RAY EXAM CHEST 1 VIEW: CPT

## 2020-01-19 PROCEDURE — 71045 X-RAY EXAM CHEST 1 VIEW: CPT | Mod: 26

## 2020-01-19 PROCEDURE — 71045 X-RAY EXAM CHEST 1 VIEW: CPT | Mod: 26,77

## 2020-01-19 PROCEDURE — 82962 GLUCOSE BLOOD TEST: CPT

## 2020-01-19 PROCEDURE — 36415 COLL VENOUS BLD VENIPUNCTURE: CPT

## 2020-01-19 RX ORDER — SODIUM CHLORIDE 9 MG/ML
1000 INJECTION INTRAMUSCULAR; INTRAVENOUS; SUBCUTANEOUS ONCE
Refills: 0 | Status: COMPLETED | OUTPATIENT
Start: 2020-01-19 | End: 2020-01-19

## 2020-01-19 RX ORDER — SODIUM CHLORIDE 9 MG/ML
2300 INJECTION INTRAMUSCULAR; INTRAVENOUS; SUBCUTANEOUS ONCE
Refills: 0 | Status: COMPLETED | OUTPATIENT
Start: 2020-01-19 | End: 2020-01-19

## 2020-01-19 RX ADMIN — SODIUM CHLORIDE 1000 MILLILITER(S): 9 INJECTION INTRAMUSCULAR; INTRAVENOUS; SUBCUTANEOUS at 19:29

## 2020-01-19 RX ADMIN — SODIUM CHLORIDE 2300 MILLILITER(S): 9 INJECTION INTRAMUSCULAR; INTRAVENOUS; SUBCUTANEOUS at 14:26

## 2020-01-19 NOTE — ED PROVIDER NOTE - OBJECTIVE STATEMENT
68 yo M PMHx of TBI,  shunt placed 2/2017 68 yo M PMHx of TBI,  shunt placed 2/2017, presents with AMS. Pt was transferred from [place] s/p CTH showed worsening hydrocephalus. Pt was taken to [place] for AMS which was noticed by NH staff around 12. He is a poor historian and gets agitated when trying to obtain HPI.

## 2020-01-19 NOTE — ED PROVIDER NOTE - PROGRESS NOTE DETAILS
Samir Rodrigues M.D. Resident: NSGY made aware of pt, will evaluate pt in ER Attending Anne Mills: pt seen by neurosurgery,unclear cause of AMS> pt afebrile rectally. unclear baseline mental status. will d/w hospitalist starting acyclovir. pt given ivf, cultures sent.

## 2020-01-19 NOTE — ED ADULT NURSE NOTE - NSIMPLEMENTINTERV_GEN_ALL_ED
Implemented All Fall with Harm Risk Interventions:  Severance to call system. Call bell, personal items and telephone within reach. Instruct patient to call for assistance. Room bathroom lighting operational. Non-slip footwear when patient is off stretcher. Physically safe environment: no spills, clutter or unnecessary equipment. Stretcher in lowest position, wheels locked, appropriate side rails in place. Provide visual cue, wrist band, yellow gown, etc. Monitor gait and stability. Monitor for mental status changes and reorient to person, place, and time. Review medications for side effects contributing to fall risk. Reinforce activity limits and safety measures with patient and family. Provide visual clues: red socks.
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
## 2020-01-19 NOTE — ED ADULT NURSE NOTE - OBJECTIVE STATEMENT
patient alert and oriented to self, denies pain, offers no complaints, sent from long term care facility for weakness.  shunt noted right head

## 2020-01-19 NOTE — ED PROVIDER NOTE - OBJECTIVE STATEMENT
68 y/o M with PMH of TBI,  shunt placed 2/2017 was BIB EMS from [facility] for generalized weakness and AMS noticed at 12pm today. Per EMS on arrival patient was hypoxic and hypotensive. Patient unable to answer further questions

## 2020-01-19 NOTE — ED PROVIDER NOTE - CLINICAL SUMMARY MEDICAL DECISION MAKING FREE TEXT BOX
70 yo M PMHx of TBI,  shunt placed 2/2017, presents with AMS and worsening hydrocephalus on CTH, unknown baseline mental status but pt becomes agitated during exam, will d/w NSGY, AMS workup, admit 70 yo M PMHx of TBI,  shunt placed 2/2017, presents with AMS and worsening hydrocephalus on CTH, unknown baseline mental status but pt becomes agitated during exam, will d/w NSGY, AMS workup, admit  Attending Anne Mills: 70 y/o male h/o TBI s/p shunt transferred for concern for ams and worsening hydrocephalus. upon arrival pt hemodynamically stable and afebrile. neurosurgery consulted for concern for shunt failure and worsening hydrocephalus. pt afebrile. no reports of new medications. will d/w medicine for admission, consider tapping the shunt
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
## 2020-01-19 NOTE — ED ADULT TRIAGE NOTE - CHIEF COMPLAINT QUOTE
Per EMS " Here for generalized weakness that started around 12 pm, also hypotensive with spo2 in the low 90's "

## 2020-01-19 NOTE — ED PROVIDER NOTE - CARE PLAN
Principal Discharge DX:	Altered mental status, unspecified altered mental status type  Secondary Diagnosis:	Hydrocephalus in adult

## 2020-01-19 NOTE — ED PROVIDER NOTE - ATTENDING CONTRIBUTION TO CARE
Attending MD Anne Mills:  I personally have seen and examined this patient.  Resident note reviewed and agree on plan of care and except where noted.  See HPI, PE, and MDM for details.

## 2020-01-19 NOTE — H&P ADULT - HISTORY OF PRESENT ILLNESS
70 yo M PMHx of TBI,  shunt placed 2/2017, presents with AMS. Pt was transferred from [place] s/p CTH showed worsening hydrocephalus. Pt was taken to [place] for AMS which was noticed by NH staff around 12. He is a poor historian and gets agitated when trying to obtain HPI.

## 2020-01-19 NOTE — ED ADULT NURSE REASSESSMENT NOTE - NS ED NURSE REASSESS COMMENT FT1
Received report from ED RN Sherita; patient A&OxO, afebrile- VSS, 20 g IV in R AC patent and site WNL, patient admitted and pending bed assignment.

## 2020-01-19 NOTE — ED ADULT NURSE NOTE - OBJECTIVE STATEMENT
69 year old male patient BIBA transferred from [place] for neuro eval. Patient with hx of hydrocephalus with  shunt and CT at [place] showed worsening hydrocephalus with concern that shunt was clogged. Patient arrives A&Ox1, EMS unsure of baseline mental status. Patient initially presented for change in mental status and generalized weakness. Patient agitated and cursing at nursing staff. Denies current HA, CP, SOB, n/v/d. Patient moving all extremities without difficulty. Neurosurg to see patient.

## 2020-01-19 NOTE — ED ADULT NURSE REASSESSMENT NOTE - NS ED NURSE REASSESS COMMENT FT1
Dr. Mcmanus notified to assess patient in triaged, directed patient to CT and BS done by RN. No further orders at this time. Dr. Mcmanus notified to assess patient in triage, directed patient to CT and BS done by RN. No further orders at this time.

## 2020-01-19 NOTE — ED PROVIDER NOTE - CLINICAL SUMMARY MEDICAL DECISION MAKING FREE TEXT BOX
Dr. Mcmanus: 69M h/o TBI,  shunt placed 2 years ago BIBEMS from [facility] for gen weakness and AMS at 12pm today, also found to be hypotensive to 60s systolic by EMS but up to 90s in the ED without intervention. On exam pt is chronically ill appearing, follows commands, unable to calculate NIHSS as pt doesn't follow all commands but non focal exam, rrr, ctab, abdo soft/nt/nd, afebrile rectally. CT shows increasing hydrocephalus which is likely the cause of pt's AMS. Pt will be transferred to [facility] for neurosurgical evaluation.

## 2020-01-19 NOTE — H&P ADULT - ASSESSMENT
The patient is a 69y Male was sent because of altered mental status.    Encephalopathy:  Neuro Sx eval noted.  No acute neuro sx eval    Agitation;  Cont Depakote/Xanax/Seroquel/Lamictal    Ventriculomegaly:  NeuroSx eval noted.  No acute Sx intervention

## 2020-01-19 NOTE — ED PROVIDER NOTE - PHYSICAL EXAMINATION
Gen: AAOx2 - place and self, non-toxic  Head: NCAT  HEENT: EOMI, oral mucosa moist, normal conjunctiva  Lung: CTAB, no respiratory distress, no wheezes/rhonchi/rales B/L, speaking in full sentences  CV: RRR, no murmurs, rubs or gallops  Abd: soft, NTND, no guarding  MSK: no visible deformities  Neuro: moving all extremities, +5/5 BL upper and lower ext strength  Skin: Warm, well perfused, no rash  Psych: becomes agitated easily  ~Samir Rodrigues M.D. Resident Gen: AAOx2 - place and self, non-toxic  Head: NCAT  HEENT: EOMI, oral mucosa moist, normal conjunctiva  Lung: CTAB, no respiratory distress, no wheezes/rhonchi/rales B/L, speaking in full sentences  CV: RRR, no murmurs, rubs or gallops  Abd: soft, NTND, no guarding  MSK: no visible deformities  Neuro: moving all extremities, +5/5 BL upper and lower ext strength  Skin: Warm, well perfused, no rash  Psych: becomes agitated easily  ~Samir Rodrigues M.D. Resident  Attending Anne Mills: gen; chronically ill appearing,heent/ atraumatic, perrla, dry mucous membranes, cv: rrr, lungs: ctab, b/l breath sounds, abdomen soft nontende,r nondistended no peritoneal signs, skin: wwp, neuro: awake, moving all extremities, not following commands

## 2020-01-20 LAB — GRAM STN FLD: SIGNIFICANT CHANGE UP

## 2020-01-20 RX ORDER — ALPRAZOLAM 0.25 MG
0.25 TABLET ORAL THREE TIMES A DAY
Refills: 0 | Status: DISCONTINUED | OUTPATIENT
Start: 2020-01-20 | End: 2020-01-22

## 2020-01-20 RX ORDER — FOLIC ACID 0.8 MG
1 TABLET ORAL DAILY
Refills: 0 | Status: DISCONTINUED | OUTPATIENT
Start: 2020-01-20 | End: 2020-01-22

## 2020-01-20 RX ORDER — HYDRALAZINE HCL 50 MG
50 TABLET ORAL THREE TIMES A DAY
Refills: 0 | Status: DISCONTINUED | OUTPATIENT
Start: 2020-01-20 | End: 2020-01-22

## 2020-01-20 RX ORDER — QUETIAPINE FUMARATE 200 MG/1
25 TABLET, FILM COATED ORAL AT BEDTIME
Refills: 0 | Status: DISCONTINUED | OUTPATIENT
Start: 2020-01-20 | End: 2020-01-22

## 2020-01-20 RX ORDER — LATANOPROST 0.05 MG/ML
1 SOLUTION/ DROPS OPHTHALMIC; TOPICAL AT BEDTIME
Refills: 0 | Status: DISCONTINUED | OUTPATIENT
Start: 2020-01-20 | End: 2020-01-20

## 2020-01-20 RX ORDER — DIVALPROEX SODIUM 500 MG/1
750 TABLET, DELAYED RELEASE ORAL EVERY 12 HOURS
Refills: 0 | Status: DISCONTINUED | OUTPATIENT
Start: 2020-01-20 | End: 2020-01-22

## 2020-01-20 RX ORDER — AMLODIPINE BESYLATE 2.5 MG/1
5 TABLET ORAL DAILY
Refills: 0 | Status: DISCONTINUED | OUTPATIENT
Start: 2020-01-20 | End: 2020-01-22

## 2020-01-20 RX ORDER — LAMOTRIGINE 25 MG/1
100 TABLET, ORALLY DISINTEGRATING ORAL
Refills: 0 | Status: DISCONTINUED | OUTPATIENT
Start: 2020-01-20 | End: 2020-01-22

## 2020-01-20 RX ORDER — LATANOPROST 0.05 MG/ML
1 SOLUTION/ DROPS OPHTHALMIC; TOPICAL AT BEDTIME
Refills: 0 | Status: DISCONTINUED | OUTPATIENT
Start: 2020-01-20 | End: 2020-01-22

## 2020-01-20 RX ADMIN — LAMOTRIGINE 100 MILLIGRAM(S): 25 TABLET, ORALLY DISINTEGRATING ORAL at 09:39

## 2020-01-20 RX ADMIN — AMLODIPINE BESYLATE 5 MILLIGRAM(S): 2.5 TABLET ORAL at 09:38

## 2020-01-20 RX ADMIN — Medication 50 MILLIGRAM(S): at 21:49

## 2020-01-20 RX ADMIN — LATANOPROST 1 DROP(S): 0.05 SOLUTION/ DROPS OPHTHALMIC; TOPICAL at 21:50

## 2020-01-20 RX ADMIN — Medication 0.25 MILLIGRAM(S): at 21:49

## 2020-01-20 RX ADMIN — Medication 0.25 MILLIGRAM(S): at 09:38

## 2020-01-20 RX ADMIN — QUETIAPINE FUMARATE 25 MILLIGRAM(S): 200 TABLET, FILM COATED ORAL at 21:49

## 2020-01-20 RX ADMIN — Medication 50 MILLIGRAM(S): at 09:38

## 2020-01-20 RX ADMIN — DIVALPROEX SODIUM 750 MILLIGRAM(S): 500 TABLET, DELAYED RELEASE ORAL at 09:39

## 2020-01-20 NOTE — CONSULT NOTE ADULT - SUBJECTIVE AND OBJECTIVE BOX
[facility]        Patient is a 69y old  Male who presents with a chief complaint of The patient is a 69y Male was sent because of altered mental status. (20 Jan 2020 05:51)    Excerpt from H&P, 68 yo M PMHx of TBI,  shunt placed 2/2017, presents with AMS. Pt was transferred from [place] s/p CTH showed worsening hydrocephalus. Pt was taken to [place] for AMS which was noticed by NH staff around 12. He is a poor historian and gets agitated when trying to obtain HPI.      Spoke to hcp anel rollins, who reports that pt has had progressive memory problems for some years  with poor short term memory. He is also very impulsive, agitated at times. He has been in and out of various housing situations, currently living at the [facility]. They sent him to [facility] of hypotension, due to poor po intake.   She was not concerned about his mental status at this time.        *****PAST MEDICAL / Surgical  HISTORY:  PAST MEDICAL & SURGICAL HISTORY:  Traumatic brain injury, without loss of consciousness, initial encounter  Hypertension  Personal history of spine surgery           *****FAMILY HISTORY:  FAMILY HISTORY:  FH: alcoholism           *****SOCIAL HISTORY:  Alcohol: None  Smoking: None         *****ALLERGIES:   Allergies    No Known Allergies    Intolerances             *****MEDICATIONS: current medication reviewed and documented.   MEDICATIONS  (STANDING):  ALPRAZolam 0.25 milliGRAM(s) Oral three times a day  amLODIPine   Tablet 5 milliGRAM(s) Oral daily  diVALproex  milliGRAM(s) Oral every 12 hours  folic acid 1 milliGRAM(s) Oral daily  hydrALAZINE 50 milliGRAM(s) Oral three times a day  lamoTRIgine 100 milliGRAM(s) Oral two times a day  latanoprost 0.005% Ophthalmic Solution 1 Drop(s) Both EYES at bedtime  QUEtiapine 25 milliGRAM(s) Oral at bedtime    MEDICATIONS  (PRN):           *****REVIEW OF SYSTEM:  GEN: no fever, no chills, no pain  RESP: no SOB, no cough, no sputum  CVS: no chest pain, no palpitations, no edema  GI: no abdominal pain, no nausea, no vomiting, no constipation, no diarrhea  : no dysurea, no frequency, no hematurea  Neuro: no headache, no dizziness  PSYCH: no anxiety, no depression  Derm : no itching, no rash         *****VITAL SIGNS:  T(C): 36.3 (01-20-20 @ 14:28), Max: 37.1 (01-19-20 @ 20:10)  HR: 89 (01-20-20 @ 14:28) (66 - 92)  BP: 130/90 (01-20-20 @ 14:28) (109/71 - 155/94)  RR: 18 (01-20-20 @ 14:28) (14 - 18)  SpO2: 100% (01-20-20 @ 14:28) (95% - 100%)  Wt(kg): --           *****PHYSICAL EXAM:   Alert refusing to answer questions, mainly with i dont know answers.   I want to go back.   Send me back. can follow simple commands with much coercion.   not cooperative with exam.    tracks blinks to threat   No facial asymmetry   Tongue is midline      Moving all 4 ext symmetrically antigravity       Gait : not assessed.  B/L down going toes               *****LAB AND IMAGING:                          15.3   11.54 )-----------( 214      ( 19 Jan 2020 17:38 )             46.2               01-19    142  |  108  |  17  ----------------------------<  101<H>  4.3   |  23  |  0.85    Ca    8.5      19 Jan 2020 17:38    TPro  6.2  /  Alb  3.5  /  TBili  0.4  /  DBili  x   /  AST  8<L>  /  ALT  8<L>  /  AlkPhos  80  01-19    PT/INR - ( 19 Jan 2020 14:00 )   PT: 12.8 sec;   INR: 1.14 ratio         PTT - ( 19 Jan 2020 14:00 )  PTT:30.7 sec            CARDIAC MARKERS ( 19 Jan 2020 14:00 )  <.017 ng/mL / x     / x     / x     / x                  < from: CT Brain Stroke Protocol (01.19.20 @ 14:01) >  HEMISPHERES:  There are chronic ischemic changes in both hemispheres with atrophy. There is a a right-sided ventriculostomy, with chronically dilated ventricles. However the ventriculostomy was not present on the prior study. No hemorrhage is noted  VENTRICLES:  There is increasing ventricular dilatation as compared to the prior CT a.  POSTERIOR FOSSA:  No acute abnormality is noted. There arechronic ischemic changes in the cerebellum  EXTRACEREBRAL SPACES:  No subdural or epidural collections are noted.  SKULL BASE AND CALVARIUM:  Appears intact.  No fracture or destructive lesion is identified.  SINUSES AND MASTOIDS:  Clear.  MISCELLANEOUS:  No orbital or suprasellar abnormality noted.      IMPRESSION:    1)  chronic ischemic changes in both hemispheres and in the posterior fossa with atrophy.  2)  increasing ventricular enlargement as compared to prior CT, with a right frontal ventriculostomy in situ. Chronic communicating hydrocephalus with worsening as suggested.              < end of copied text >      [All pertinent recent Imaging reports reviewed]         *****A S S E S S M E N T   A N D   P L A N :      68 yo M PMHx of TBI,  shunt placed 2/2017, presents with AMS. Pt was transferred from [place] s/p CTH showed worsening hydrocephalus. Pt was taken to [place] for AMS which was noticed by NH staff around 12. He is a poor historian and gets agitated when trying to obtain HPI.   Spoke to hcp/Legal guardian  Anel ROLLINS who reports that pt has had progressive memory problems for some years  with poor short term memory. He is also very impulsive, agitated at times. He has been in and out of various housing situations, currently living at the [facility]. They sent him to [facility] of hypotension, due to poor po intake.   She was not concerned about his mental status at this time.        Problem/Recommendations 1: Agitation at baseline per HCP  head ct reviewed  neurosurgical input appreciated,  not concerned about shunt failure, no intervention planned.   based on my evaluation it appears that pt is at baseline   pt eval if there are no concerns   no further w/u anticipated.   f/u with regular doctors on discharge.       ___________________________  Will follow with you.  Thank you,  Emeli Lilly MD  Diplomate of the American Board of Neurology and Psychiatry.  Diplomate of the American Board of Vascular Neurology.   [facility] ([facility]), [facility]   Ph: [phone]    Differential diagnosis and plan of care discussed with patient after the evaluation.   Advanced care planning options discussed.   Pain assessed and judicious use of narcotics when appropriate was discussed.  Importance of Fall prevention discussed.  Counseling on Smoking and Alcohol cessation was offered when appropriate.  Counseling on Diet, exercise, and medication compliance was done.   83 minutes spent on the total encounter;  more than 50 % of the visit was spent on counseling  and or coordinating care by the attending physician.    Thank you for allowing me to participate in the care of this jett patient. Please do not hesitate to call me if you have any questions.     This and subsequent notes were partially created using voice recognition software and will  inherently be subject to errors including those of syntax and sound alike substitutions which may escape proofreading. In such instances original meaning may be extrapolated by contextual derivation.

## 2020-01-20 NOTE — PROGRESS NOTE ADULT - SUBJECTIVE AND OBJECTIVE BOX
Patient is a 69y old  Male who presents with a chief complaint of The patient is a 69y Male was sent because of altered mental status. (20 Jan 2020 11:56)      SUBJECTIVE / OVERNIGHT EVENTS:    Events noted.  Intermittent agitation  No N/V  More alert    MEDICATIONS  (STANDING):  ALPRAZolam 0.25 milliGRAM(s) Oral three times a day  amLODIPine   Tablet 5 milliGRAM(s) Oral daily  diVALproex  milliGRAM(s) Oral every 12 hours  folic acid 1 milliGRAM(s) Oral daily  hydrALAZINE 50 milliGRAM(s) Oral three times a day  lamoTRIgine 100 milliGRAM(s) Oral two times a day  latanoprost 0.005% Ophthalmic Solution 1 Drop(s) Both EYES at bedtime  QUEtiapine 25 milliGRAM(s) Oral at bedtime    MEDICATIONS  (PRN):        CAPILLARY BLOOD GLUCOSE        I&O's Summary    20 Jan 2020 07:01  -  20 Jan 2020 22:33  --------------------------------------------------------  IN: 354 mL / OUT: 0 mL / NET: 354 mL        PHYSICAL EXAM:  GENERAL: NAD  NECK: Supple, No JVD  CHEST/LUNG: Clear to auscultation bilaterally; No wheezing.  HEART: Regular rate and rhythm; No murmurs, rubs, or gallops  ABDOMEN: Soft, Nontender, Nondistended; Bowel sounds present  EXTREMITIES:   No edema  NEUROLOGY: AAO       LABS:                        15.3   11.54 )-----------( 214      ( 19 Jan 2020 17:38 )             46.2     01-19    142  |  108  |  17  ----------------------------<  101<H>  4.3   |  23  |  0.85    Ca    8.5      19 Jan 2020 17:38    TPro  6.2  /  Alb  3.5  /  TBili  0.4  /  DBili  x   /  AST  8<L>  /  ALT  8<L>  /  AlkPhos  80  01-19    PT/INR - ( 19 Jan 2020 14:00 )   PT: 12.8 sec;   INR: 1.14 ratio         PTT - ( 19 Jan 2020 14:00 )  PTT:30.7 sec  CARDIAC MARKERS ( 19 Jan 2020 14:00 )  <.017 ng/mL / x     / x     / x     / x            CAPILLARY BLOOD GLUCOSE        01-19 @ 14:00  Culture-urine --  Culture results   No growth to date.  method type --  Organism --  Organism Identification --  Specimen source .Blood Blood-Peripheral           01-19 @ 14:00  Culture blood --  Culture results   No growth to date.  Gram stain --  Gram stain blood --  Method type --  Organism --  Organism identification --  Specimen source .Blood Blood-Peripheral      RADIOLOGY & ADDITIONAL TESTS:    Imaging Personally Reviewed:    Consultant(s) Notes Reviewed:      Care Discussed with Consultants/Other Providers:

## 2020-01-20 NOTE — PROGRESS NOTE ADULT - SUBJECTIVE AND OBJECTIVE BOX
Patient seen and examined at bedside.    --Anticoagulation--    T(C): 36.6 (01-20-20 @ 04:49), Max: 37.1 (01-19-20 @ 20:10)  HR: 71 (01-20-20 @ 04:49) (66 - 92)  BP: 144/64 (01-20-20 @ 04:49) (93/61 - 155/94)  RR: 17 (01-20-20 @ 04:49) (14 - 18)  SpO2: 95% (01-20-20 @ 04:49) (90% - 98%)  Wt(kg): --    Exam: Awake, alert, non-cooperative with examination. EOM appear intact as he tracks around the room. Does not answer orientation questions. MENDEZ at least anti-gravity in all four extremities requiring restraints

## 2020-01-20 NOTE — PROGRESS NOTE ADULT - ASSESSMENT
The patient is a 69y Male was sent because of altered mental status.    Encephalopathy:  Neuro eval noted.  Mental status baseline      Dementia with Agitation;  Cont Depakote/Xanax/Seroquel/Lamictal  Psych eval    Ventriculomegaly:  NeuroSx eval noted.  No acute Sx intervention

## 2020-01-20 NOTE — PROGRESS NOTE ADULT - ASSESSMENT
69M w hx of Bipolar, TBI and  shunt placed in 2017 at an OSH, who presents from a facility with encephalopathy and CT finds of ventriculomegaly    Plan:  - No concern for acute shunt failure given symptoms at this time. Patient is wide awake. Additionally, CT scan findings do show ventriculomegaly, however there is worsening general cerebral encephalomalacia so this may be 2/2 ex-vacuo changes  - Recommend Medicine admission and evaluation for encephalopathy  - Patient has elevated WBC. Recommend urine and other encephalopathy tests  - If all other workup turns up negative, we can re-evaluate the patient for possible shunt tap

## 2020-01-21 DIAGNOSIS — F03.91 UNSPECIFIED DEMENTIA WITH BEHAVIORAL DISTURBANCE: ICD-10-CM

## 2020-01-21 DIAGNOSIS — F25.9 SCHIZOAFFECTIVE DISORDER, UNSPECIFIED: ICD-10-CM

## 2020-01-21 LAB
-  COAGULASE NEGATIVE STAPHYLOCOCCUS: SIGNIFICANT CHANGE UP
ANION GAP SERPL CALC-SCNC: 12 MMOL/L — SIGNIFICANT CHANGE UP (ref 5–17)
BUN SERPL-MCNC: 12 MG/DL — SIGNIFICANT CHANGE UP (ref 7–23)
CALCIUM SERPL-MCNC: 8.5 MG/DL — SIGNIFICANT CHANGE UP (ref 8.4–10.5)
CHLORIDE SERPL-SCNC: 105 MMOL/L — SIGNIFICANT CHANGE UP (ref 96–108)
CO2 SERPL-SCNC: 23 MMOL/L — SIGNIFICANT CHANGE UP (ref 22–31)
CREAT SERPL-MCNC: 0.66 MG/DL — SIGNIFICANT CHANGE UP (ref 0.5–1.3)
CULTURE RESULTS: SIGNIFICANT CHANGE UP
GLUCOSE SERPL-MCNC: 104 MG/DL — HIGH (ref 70–99)
GRAM STN FLD: SIGNIFICANT CHANGE UP
HCT VFR BLD CALC: 44.4 % — SIGNIFICANT CHANGE UP (ref 39–50)
HGB BLD-MCNC: 15.1 G/DL — SIGNIFICANT CHANGE UP (ref 13–17)
MCHC RBC-ENTMCNC: 30.6 PG — SIGNIFICANT CHANGE UP (ref 27–34)
MCHC RBC-ENTMCNC: 34 GM/DL — SIGNIFICANT CHANGE UP (ref 32–36)
MCV RBC AUTO: 90.1 FL — SIGNIFICANT CHANGE UP (ref 80–100)
METHOD TYPE: SIGNIFICANT CHANGE UP
NRBC # BLD: 0 /100 WBCS — SIGNIFICANT CHANGE UP (ref 0–0)
ORGANISM # SPEC MICROSCOPIC CNT: SIGNIFICANT CHANGE UP
ORGANISM # SPEC MICROSCOPIC CNT: SIGNIFICANT CHANGE UP
PLATELET # BLD AUTO: 206 K/UL — SIGNIFICANT CHANGE UP (ref 150–400)
POTASSIUM SERPL-MCNC: 3.4 MMOL/L — LOW (ref 3.5–5.3)
POTASSIUM SERPL-SCNC: 3.4 MMOL/L — LOW (ref 3.5–5.3)
RBC # BLD: 4.93 M/UL — SIGNIFICANT CHANGE UP (ref 4.2–5.8)
RBC # FLD: 13.7 % — SIGNIFICANT CHANGE UP (ref 10.3–14.5)
SODIUM SERPL-SCNC: 140 MMOL/L — SIGNIFICANT CHANGE UP (ref 135–145)
SPECIMEN SOURCE: SIGNIFICANT CHANGE UP
WBC # BLD: 8.63 K/UL — SIGNIFICANT CHANGE UP (ref 3.8–10.5)
WBC # FLD AUTO: 8.63 K/UL — SIGNIFICANT CHANGE UP (ref 3.8–10.5)

## 2020-01-21 PROCEDURE — 99222 1ST HOSP IP/OBS MODERATE 55: CPT

## 2020-01-21 RX ORDER — POTASSIUM CHLORIDE 20 MEQ
40 PACKET (EA) ORAL ONCE
Refills: 0 | Status: COMPLETED | OUTPATIENT
Start: 2020-01-21 | End: 2020-01-21

## 2020-01-21 RX ORDER — VANCOMYCIN HCL 1 G
1000 VIAL (EA) INTRAVENOUS ONCE
Refills: 0 | Status: COMPLETED | OUTPATIENT
Start: 2020-01-21 | End: 2020-01-21

## 2020-01-21 RX ADMIN — LATANOPROST 1 DROP(S): 0.05 SOLUTION/ DROPS OPHTHALMIC; TOPICAL at 21:40

## 2020-01-21 RX ADMIN — Medication 50 MILLIGRAM(S): at 14:28

## 2020-01-21 RX ADMIN — Medication 50 MILLIGRAM(S): at 07:55

## 2020-01-21 RX ADMIN — LAMOTRIGINE 100 MILLIGRAM(S): 25 TABLET, ORALLY DISINTEGRATING ORAL at 16:46

## 2020-01-21 RX ADMIN — Medication 250 MILLIGRAM(S): at 03:00

## 2020-01-21 RX ADMIN — Medication 50 MILLIGRAM(S): at 21:39

## 2020-01-21 RX ADMIN — Medication 0.25 MILLIGRAM(S): at 21:39

## 2020-01-21 RX ADMIN — LAMOTRIGINE 100 MILLIGRAM(S): 25 TABLET, ORALLY DISINTEGRATING ORAL at 07:55

## 2020-01-21 RX ADMIN — DIVALPROEX SODIUM 750 MILLIGRAM(S): 500 TABLET, DELAYED RELEASE ORAL at 07:55

## 2020-01-21 RX ADMIN — DIVALPROEX SODIUM 750 MILLIGRAM(S): 500 TABLET, DELAYED RELEASE ORAL at 16:46

## 2020-01-21 RX ADMIN — Medication 1 MILLIGRAM(S): at 14:28

## 2020-01-21 RX ADMIN — AMLODIPINE BESYLATE 5 MILLIGRAM(S): 2.5 TABLET ORAL at 07:55

## 2020-01-21 RX ADMIN — QUETIAPINE FUMARATE 25 MILLIGRAM(S): 200 TABLET, FILM COATED ORAL at 21:39

## 2020-01-21 RX ADMIN — Medication 40 MILLIEQUIVALENT(S): at 19:58

## 2020-01-21 RX ADMIN — Medication 0.25 MILLIGRAM(S): at 14:28

## 2020-01-21 RX ADMIN — Medication 0.25 MILLIGRAM(S): at 07:55

## 2020-01-21 NOTE — BEHAVIORAL HEALTH ASSESSMENT NOTE - RISK ASSESSMENT
Low Acute Suicide Risk Risk factors: h/o SA, h/o 1x psych admission, dementia    Protective factors: no current SIIP/HIIP, no access to weapons, no active substance abuse, domiciled, social supports, positive therapeutic relationship, compliant with treatment    Pt with chronically elevated risk mitigated by multiple protective factors; does not meet criteria for psychiatric admission.

## 2020-01-21 NOTE — BEHAVIORAL HEALTH ASSESSMENT NOTE - NSBHCHARTREVIEWLAB_PSY_A_CORE FT
15.1   8.63  )-----------( 206      ( 21 Jan 2020 02:54 )             44.4     01-21    140  |  105  |  12  ----------------------------<  104<H>  3.4<L>   |  23  |  0.66    Ca    8.5      21 Jan 2020 02:46    TPro  6.2  /  Alb  3.5  /  TBili  0.4  /  DBili  x   /  AST  8<L>  /  ALT  8<L>  /  AlkPhos  80  01-19

## 2020-01-21 NOTE — CONSULT NOTE ADULT - SUBJECTIVE AND OBJECTIVE BOX
"HPI: 70 yo M PMHx of TBI,  shunt placed 2/2017, presents with AMS. Pt was transferred from [place] s/p [facility] showed worsening hydrocephalus. Pt was taken to [place] for AMS which was noticed by NH staff around 12. He is a poor historian and gets agitated when trying to obtain HPI. (19 Jan 2020 22:27)"    Above reviewed. Saw/spoke to patient. Patient with history  shunt in setting of TBI. Patient found to have episode of AMS. This AM, seems more clear than described in initial HPI. No fevers, no chills. No cough/sob. No abd pain. No pain at  shunt site. No N/V/D. No dysuria. No other new complaints. Patient feels generally okay and is able to interact with me. Patient was found to have blood cultures positive. ID called for further eval.    PAST MEDICAL & SURGICAL HISTORY:  Traumatic brain injury, without loss of consciousness, initial encounter  Hypertension  Personal history of spine surgery    Allergies    No Known Allergies    ANTIMICROBIALS:  Off    OTHER MEDS:  ALPRAZolam 0.25 milliGRAM(s) Oral three times a day  amLODIPine   Tablet 5 milliGRAM(s) Oral daily  diVALproex  milliGRAM(s) Oral every 12 hours  folic acid 1 milliGRAM(s) Oral daily  hydrALAZINE 50 milliGRAM(s) Oral three times a day  lamoTRIgine 100 milliGRAM(s) Oral two times a day  latanoprost 0.005% Ophthalmic Solution 1 Drop(s) Both EYES at bedtime  QUEtiapine 25 milliGRAM(s) Oral at bedtime    SOCIAL HISTORY: No tobacco, no alcohol, no ilicit drugs    FAMILY HISTORY:  FH: alcoholism    Drug Dosing Weight  Height (cm): 170.2 (19 Jan 2020 20:10)  Weight (kg): 75.5 (19 Jan 2020 20:10)  BMI (kg/m2): 26.1 (19 Jan 2020 20:10)  BSA (m2): 1.87 (19 Jan 2020 20:10)    PE:    Vital Signs Last 24 Hrs  T(C): 36.7 (21 Jan 2020 06:14), Max: 36.8 (20 Jan 2020 21:10)  T(F): 98.1 (21 Jan 2020 06:14), Max: 98.3 (20 Jan 2020 21:10)  HR: 84 (21 Jan 2020 06:14) (72 - 89)  BP: 142/68 (21 Jan 2020 06:14) (130/90 - 166/89)  RR: 18 (21 Jan 2020 06:14) (16 - 18)  SpO2: 99% (21 Jan 2020 06:14) (96% - 100%)    Gen: AOx3, NAD, non-toxic, pleasant  HEENT:  shunt in place, no erythema no breaks in skin  CV: S1+S2 normal, nontachycardic  Resp: Clear bilat, no resp distress, no crackles/wheezes  Abd: Soft, nontender, +BS  Ext: No LE edema, no wounds  : No suprapubic tenderness  IV/Skin: No thrombophlebitis, no rash  Msk: No low back pain, no arthralgias, no joint swelling  Neuro: No sensory deficits, no motor deficits    LABS:                        15.1   8.63  )-----------( 206      ( 21 Jan 2020 02:54 )             44.4     01-21    140  |  105  |  12  ----------------------------<  104<H>  3.4<L>   |  23  |  0.66    Ca    8.5      21 Jan 2020 02:46    TPro  6.2  /  Alb  3.5  /  TBili  0.4  /  DBili  x   /  AST  8<L>  /  ALT  8<L>  /  AlkPhos  80  01-19    MICROBIOLOGY:    .Blood Blood  01-19-20   Growth in aerobic bottle: Gram Positive Cocci in Clusters  --  Blood Culture PCR CoNS    .Blood Blood-Peripheral  01-19-20   No growth to date.    RADIOLOGY:    1/19 CXR:    IMPRESSION:  Suboptimally inflated but otherwise clear lungs. No pleural effusions or pneumothorax.    Stable cardiac and mediastinal silhouette which do not appear enlarged. Aortic arch calcifications again noted.    Trachea midline.    Stable upper thoracic posterior spinal fusion hardware.    Right-sided  shunt tubing again seen.    Nonspecific gas distended colon noted in visualized upper abdomen. No subdiaphragmatic free air.    1/19 CT:    IMPRESSION:    1)  chronic ischemic changes in both hemispheres and in the posterior fossa with atrophy.  2)  increasing ventricular enlargement as compared to prior CT, with a right frontal ventriculostomy in situ. Chronic communicating hydrocephalus with worsening as suggested.

## 2020-01-21 NOTE — BEHAVIORAL HEALTH ASSESSMENT NOTE - SUMMARY
Contraindicated
This is a 69 yo male, single, disabled, domiciled at [facility] facility for past 2 years, PPH schizoaffective disorder, dementia, currently treated by Psychiatrist at [facility], 1 previous psychiatric hospitalization 10 years ago following suicide attempt, with PMHx of TBI, NPH with shunt, hx of encephalopathy, HTN, admitted for AMS, psychiatry consulted for management of agitation.  Pt currently AOx1, calm and cooperative although is agitated at baseline intermittently per cousin.  Follows with psychiatrist at his nursing home.  No current SI/HI.

## 2020-01-21 NOTE — CHART NOTE - NSCHARTNOTEFT_GEN_A_CORE
Medicine NP Episodic Note    HPI: 69M w/ PMHx of TBI,  shunt placed 2/2017, p/w AMS.    Event Summary: Notified by RN of critical results as follows:     Culture - Blood (01.19.20 @ 22:52)    -  Coagulase negative Staphylococcus: Detec    Gram Stain:   Growth in aerobic bottle: Gram positive cocci in pairs    Specimen Source: .Blood Blood    Organism: Blood Culture PCR    Culture Results:   Growth in aerobic bottle: Gram positive cocci in pairs    Pt. seen and examined at bedside, non-toxic appearing.  VSS, afebrile.  Offered no significant complaints.  No fever, chills, diaphoresis.    # Bacteremia   > Repeat blood cultures x 2  > Vancomycin 1G IVPB x 1  > Monitor vital signs   > F/u am labs   > ID consult per attending discretion     Will endorse to primary team in am.  Attending to follow.    Elvira Mckeon, ERIN-BC  [phone]

## 2020-01-21 NOTE — BEHAVIORAL HEALTH ASSESSMENT NOTE - OTHER PAST PSYCHIATRIC HISTORY (INCLUDE DETAILS REGARDING ONSET, COURSE OF ILLNESS, INPATIENT/OUTPATIENT TREATMENT)
1 suicide attempt 10 years ago, prior psychiatric hospitalization at that time following suicide attempt  sees Psychiatrist at nursing facility regularly

## 2020-01-21 NOTE — PROGRESS NOTE ADULT - SUBJECTIVE AND OBJECTIVE BOX
Patient is a 69y old  Male who presents with a chief complaint of The patient is a 69y Male was sent because of altered mental status. (21 Jan 2020 12:27)      SUBJECTIVE / OVERNIGHT EVENTS:    Events noted.  CONSTITUTIONAL: No fever,  or fatigue  RESPIRATORY: No cough, wheezing,  No shortness of breath  CARDIOVASCULAR: No chest pain, palpitations, dizziness, or leg swelling  GASTROINTESTINAL: No abdominal or epigastric pain.   NEUROLOGICAL: No headaches,     MEDICATIONS  (STANDING):  ALPRAZolam 0.25 milliGRAM(s) Oral three times a day  amLODIPine   Tablet 5 milliGRAM(s) Oral daily  diVALproex  milliGRAM(s) Oral every 12 hours  folic acid 1 milliGRAM(s) Oral daily  hydrALAZINE 50 milliGRAM(s) Oral three times a day  lamoTRIgine 100 milliGRAM(s) Oral two times a day  latanoprost 0.005% Ophthalmic Solution 1 Drop(s) Both EYES at bedtime  QUEtiapine 25 milliGRAM(s) Oral at bedtime    MEDICATIONS  (PRN):        CAPILLARY BLOOD GLUCOSE        I&O's Summary    20 Jan 2020 07:01  -  21 Jan 2020 07:00  --------------------------------------------------------  IN: 722 mL / OUT: 0 mL / NET: 722 mL    21 Jan 2020 07:01  -  21 Jan 2020 22:34  --------------------------------------------------------  IN: 360 mL / OUT: 0 mL / NET: 360 mL        PHYSICAL EXAM:  GENERAL: NAD  NECK: Supple, No JVD  CHEST/LUNG: Clear to auscultation bilaterally; No wheezing.  HEART: Regular rate and rhythm; No murmurs, rubs, or gallops  ABDOMEN: Soft, Nontender, Nondistended; Bowel sounds present  EXTREMITIES:   No edema  NEUROLOGY: AAO X 3      LABS:                        15.1   8.63  )-----------( 206      ( 21 Jan 2020 02:54 )             44.4     01-21    140  |  105  |  12  ----------------------------<  104<H>  3.4<L>   |  23  |  0.66    Ca    8.5      21 Jan 2020 02:46              CAPILLARY BLOOD GLUCOSE        01-19 @ 22:52  Culture-urine --  Culture results   Growth in aerobic bottle: Gram Positive Cocci in Clusters  method type PCR  Organism Blood Culture PCR  Organism Identification Blood Culture PCR  Specimen source .Blood Blood  01-19 @ 14:00  Culture-urine --  Culture results   No growth to date.  method type --  Organism --  Organism Identification --  Specimen source .Blood Blood-Peripheral           01-19 @ 22:52  Culture blood --  Culture results   Growth in aerobic bottle: Gram Positive Cocci in Clusters  Gram stain   Growth in aerobic bottle: Gram Positive Cocci in Clusters  Gram stain blood --  Method type PCR  Organism Blood Culture PCR  Organism identification Blood Culture PCR  Specimen source .Blood Blood   01-19 @ 14:00  Culture blood --  Culture results   No growth to date.  Gram stain --  Gram stain blood --  Method type --  Organism --  Organism identification --  Specimen source .Blood Blood-Peripheral      RADIOLOGY & ADDITIONAL TESTS:    Imaging Personally Reviewed:    Consultant(s) Notes Reviewed:      Care Discussed with Consultants/Other Providers:

## 2020-01-21 NOTE — PROGRESS NOTE ADULT - SUBJECTIVE AND OBJECTIVE BOX
[facility] Neurological [facility]      Seen earlier today, and examined.  - Today, patient is without complaints.           *****MEDICATIONS: Current medication reviewed and documented.    MEDICATIONS  (STANDING):  ALPRAZolam 0.25 milliGRAM(s) Oral three times a day  amLODIPine   Tablet 5 milliGRAM(s) Oral daily  diVALproex  milliGRAM(s) Oral every 12 hours  folic acid 1 milliGRAM(s) Oral daily  hydrALAZINE 50 milliGRAM(s) Oral three times a day  lamoTRIgine 100 milliGRAM(s) Oral two times a day  latanoprost 0.005% Ophthalmic Solution 1 Drop(s) Both EYES at bedtime  QUEtiapine 25 milliGRAM(s) Oral at bedtime    MEDICATIONS  (PRN):          ***** VITAL SIGNS:  T(F): 98.1 (01-21-20 @ 06:14), Max: 98.3 (01-20-20 @ 21:10)  HR: 84 (01-21-20 @ 06:14) (72 - 89)  BP: 142/68 (01-21-20 @ 06:14) (130/90 - 166/89)  RR: 18 (01-21-20 @ 06:14) (16 - 18)  SpO2: 99% (01-21-20 @ 06:14) (96% - 100%)  Wt(kg): --  ,   I&O's Summary    20 Jan 2020 07:01  -  21 Jan 2020 07:00  --------------------------------------------------------  IN: 722 mL / OUT: 0 mL / NET: 722 mL             *****PHYSICAL EXAM: Alert   more cooperative today.   repeatedly states " I want to go back.   Send me back. " can follow simple commands    not fully cooperative with exam.    tracks blinks to threat   No facial asymmetry   Tongue is midline      Moving all 4 ext symmetrically antigravity       Gait : not assessed.  B/L down going toes        *****LAB AND IMAGING:                        15.1   8.63  )-----------( 206      ( 21 Jan 2020 02:54 )             44.4               01-21    140  |  105  |  12  ----------------------------<  104<H>  3.4<L>   |  23  |  0.66    Ca    8.5      21 Jan 2020 02:46    TPro  6.2  /  Alb  3.5  /  TBili  0.4  /  DBili  x   /  AST  8<L>  /  ALT  8<L>  /  AlkPhos  80  01-19    PT/INR - ( 19 Jan 2020 14:00 )   PT: 12.8 sec;   INR: 1.14 ratio         PTT - ( 19 Jan 2020 14:00 )  PTT:30.7 sec       CARDIAC MARKERS ( 19 Jan 2020 14:00 )  <.017 ng/mL / x     / x     / x     / x                    [All pertinent recent Imaging/Reports reviewed]           *****A S S E S S M E N T   A N D   P L A N :     68 yo M PMHx of TBI,  shunt placed 2/2017, presents with AMS. Pt was transferred from [place] s/p CT showed worsening hydrocephalus. Pt was taken to [place] for AMS which was noticed by NH staff around 12. He is a poor historian and gets agitated when trying to obtain HPI.     Spoke to hcp/Legal guardian  Anel ARASH who reports that pt has had progressive memory problems for some years  with poor short term memory. He is also very impulsive, agitated at times. He has been in and out of various housing situations, currently living at the [facility]. They sent him to [facility] of hypotension, due to poor po intake.   She was not concerned about his mental status at this time.        Problem/Recommendations 1: Agitation at baseline per HCP  head ct reviewed  neurosurgical input appreciated,  not concerned about shunt failure, no intervention planned.   based on my evaluation it appears that pt is at baseline   pt eval    no further w/u anticipated.       Problem/Recommendations 2: Bacteremia   abx per primary team.            Thank you for allowing me to participate in the care of this patient. Please do not hesitate to call me if you have any  questions.        ________________  Emeli Lilly MD  [facility] (PN)[facility]  [phone]      33 minutes spent on total encounter; more than 50 % of the visit was  spent counseling about plan of care, compliance to diet/exercise and medication regimen and or  coordinating care by the attending physician.      It is advised that stroke patients follow up with KELSEY Zavala @ [phone] in 1- 2 weeks.   Others please follow up with Dr. Michael Nissenbaum [phone]

## 2020-01-21 NOTE — BEHAVIORAL HEALTH ASSESSMENT NOTE - NSBHCONSULTMEDS_PSY_A_CORE FT
1. C/w home psychiatric meds, check VPA level    2. PRN: Haldol 2mg PO/IV q6h PRN severe agitation.  Monitor EKG for QTc<500.  Melatonin 3mg PO qHS PRN insomnia.    3. Minimize use of benzos, opioids, anticholinergics, or other deliriogenic agents when possible.  Maintain sleep wake cycle.  Provide frequent reorientation and redirection.  Family member at bedside if possible. Assess for need for glasses and hearing aid (if applicable).    4. Pt does not meet criteria for psychiatric hospitalization.

## 2020-01-21 NOTE — PROVIDER CONTACT NOTE (CRITICAL VALUE NOTIFICATION) - ACTION/TREATMENT ORDERED:
Repeat blood cultures were ordered and sent. One time dose of vancomycin was given. Currently waiting on blood culture results

## 2020-01-21 NOTE — BEHAVIORAL HEALTH ASSESSMENT NOTE - NSBHCHARTREVIEWVS_PSY_A_CORE FT
T(C): 36.7 (01-21-20 @ 06:14), Max: 36.8 (01-20-20 @ 21:10)  HR: 84 (01-21-20 @ 06:14) (72 - 84)  BP: 142/68 (01-21-20 @ 06:14) (142/68 - 166/89)  RR: 18 (01-21-20 @ 06:14) (16 - 18)  SpO2: 99% (01-21-20 @ 06:14) (96% - 99%)

## 2020-01-21 NOTE — BEHAVIORAL HEALTH ASSESSMENT NOTE - DETAILS
see HPI intermittently agitated at baseline per cousin cousin states pt's parents were both alcoholics [employer]

## 2020-01-21 NOTE — PROVIDER CONTACT NOTE (CRITICAL VALUE NOTIFICATION) - BACKGROUND
Pt received a critical regarding BC result earlier in the shift: Growth in aerobic bottle of gram + cocci in pairs from the blood culture taken on 1/19. New critical on the second blood culture from 1/19 is growth in aerobic bottle of gram + cocci in clusters.

## 2020-01-21 NOTE — BEHAVIORAL HEALTH ASSESSMENT NOTE - HPI (INCLUDE ILLNESS QUALITY, SEVERITY, DURATION, TIMING, CONTEXT, MODIFYING FACTORS, ASSOCIATED SIGNS AND SYMPTOMS)
This is a 67 yo male, single, disabled, domiciled at [facility] for past 2 years, PPH schizoaffective disorder, dementia, currently treated by Psychiatrist at [facility], 1 previous psychiatric hospitalization 10 years ago following suicide attempt, with PMHx of TBI, NPH with shunt, hx of encephalopathy, HTN, admitted for AMS, psychiatry consulted for management of agitation.     On evaluation, pt is cooperative and pleasant. Pt is a poor historian and A&O x1 only (to person, could also correctly identify president when given choices). Pt is a limited historian with impoverished thought content; denies any psychiatric history, denies any current feelings of depression, agitation, visual or auditory hallucinations, paranoid thoughts, SI/SA or HI.  Per staff, pt was agitated initially upon arrival, but has been calm and cooperative since coming to the floor, has not required PRN medications.    Spoke with Anel, cousin and legal guardian of patient. She reports patient is currently at his baseline other than an increased difficulty in finding his words. She endorses pt having chronic agitation and mentions he is generally "unhappy with life".  She states pt expresses passive SI from time to time, but is in a supervised environment and last suicide attempt was 10 years ago. Last known use of ETOH and tobacco was 2 years ago before pt began living at [facility]. Denies illicit drug use or h/o HI.

## 2020-01-21 NOTE — PROGRESS NOTE ADULT - ASSESSMENT
The patient is a 69y Male was sent because of altered mental status.    Encephalopathy:  Neuro f/up noted.  Mental status baseline    Dementia with Agitation;  Cont Depakote/Xanax/Seroquel/Lamictal  Psych eval appreciated    Bacteremia:  ID eval noted.  Repeat Bl Cx

## 2020-01-21 NOTE — CONSULT NOTE ADULT - REASON FOR ADMISSION
The patient is a 69y Male was sent because of altered mental status.
The patient is a 69y Male was sent because of altered mental status.

## 2020-01-21 NOTE — BEHAVIORAL HEALTH ASSESSMENT NOTE - NSBHCHARTREVIEWIMAGING_PSY_A_CORE FT
< from: Xray Chest 1 View AP/PA (01.19.20 @ 14:26) >    IMPRESSION: No acute lung finding. Other findings as above.    < from: Xray Chest 1 View- PORTABLE-Urgent (01.19.20 @ 18:43) >    IMPRESSION:  Suboptimally inflated but otherwise clear lungs. No pleural effusions or pneumothorax.    Stable cardiac and mediastinal silhouette which do not appear enlarged. Aortic arch calcifications again noted.    Trachea midline.    Stable upper thoracic posterior spinal fusion hardware.    Right-sided  shunt tubing again seen.    Nonspecific gas distended colon noted in visualized upper abdomen. No subdiaphragmatic free air.    < from: CT Brain Stroke Protocol (01.19.20 @ 14:01) >    IMPRESSION:    1)  chronic ischemic changes in both hemispheres and in the posterior fossa with atrophy.  2)  increasing ventricular enlargement as compared to prior CT, with a right frontal ventriculostomy in situ. Chronic communicating hydrocephalus with worsening as suggested.

## 2020-01-21 NOTE — CONSULT NOTE ADULT - ASSESSMENT
69M w hx of Bipolar, TBI and  shunt placed in 2017 at an OSH, who presents from a facility with encephalopathy and CT finds of ventriculomegaly    Plan:  - No concern for acute shunt failure given symptoms at this time. Patient is wide awake. Additionally, CT scan findings do show ventriculomegaly, however there is worsening general cerebral encephalomalacia so this may be 2/2 ex-vacuo changes  - Recommend Medicine admission and evaluation for encephalopathy  - Patient has elevated WBC. Recommend urine and other encephalopathy tests  - If all other workup turns up negative, we can re-evaluate the patient for possible shunt tap
68 yo M PMHx of TBI,  shunt placed 2/2017, presents with AMS  No fever, no leukocytosis  Initially with AMS, and appears to have improved today  BCXs 2/4 CoNS--suspect contam?  Neuro believes mental status back to baseline  Rule out bacteremia  Overall,  1) CoNS in BCX  - Suspect contam, F/U pending BCXs (appears drawn before antibiotic given)  - Hold Vanco for now, if patient has signs sepsis would resume  - Further antibiotic plan depending on culture results  2) AMS  - Monitor mental status for any worsening    Edin Mars MD  Pager [phone]  After 5pm and on weekends call [phone]

## 2020-01-21 NOTE — BEHAVIORAL HEALTH ASSESSMENT NOTE - NSBHCHARTREVIEWINVESTIGATE_PSY_A_CORE FT
< from: 12 Lead ECG (01.19.20 @ 15:04) >      Ventricular Rate 74 BPM    Atrial Rate 74 BPM    P-R Interval 168 ms    QRS Duration 114 ms    Q-T Interval 430 ms    QTC Calculation(Bezet) 477 ms    P Axis 47 degrees    R Axis -64 degrees    T Axis 25 degrees    Diagnosis Line Normal sinus rhythm  Left axis deviation  Incomplete right bundle branch block

## 2020-01-21 NOTE — BEHAVIORAL HEALTH ASSESSMENT NOTE - SUICIDE RISK FACTORS
Agitation/Severe Anxiety/Panic/Other/TBI current/past/Psychotic disorder current/past/Unable to engage in safety planning

## 2020-01-22 ENCOUNTER — TRANSCRIPTION ENCOUNTER (OUTPATIENT)
Age: 70
End: 2020-01-22

## 2020-01-22 VITALS
TEMPERATURE: 98 F | DIASTOLIC BLOOD PRESSURE: 68 MMHG | HEART RATE: 69 BPM | SYSTOLIC BLOOD PRESSURE: 109 MMHG | OXYGEN SATURATION: 92 % | RESPIRATION RATE: 17 BRPM

## 2020-01-22 LAB
ANION GAP SERPL CALC-SCNC: 13 MMOL/L — SIGNIFICANT CHANGE UP (ref 5–17)
BUN SERPL-MCNC: 15 MG/DL — SIGNIFICANT CHANGE UP (ref 7–23)
CALCIUM SERPL-MCNC: 8.4 MG/DL — SIGNIFICANT CHANGE UP (ref 8.4–10.5)
CHLORIDE SERPL-SCNC: 106 MMOL/L — SIGNIFICANT CHANGE UP (ref 96–108)
CO2 SERPL-SCNC: 21 MMOL/L — LOW (ref 22–31)
CREAT SERPL-MCNC: 0.68 MG/DL — SIGNIFICANT CHANGE UP (ref 0.5–1.3)
CULTURE RESULTS: SIGNIFICANT CHANGE UP
GLUCOSE SERPL-MCNC: 88 MG/DL — SIGNIFICANT CHANGE UP (ref 70–99)
GRAM STN FLD: SIGNIFICANT CHANGE UP
POTASSIUM SERPL-MCNC: 4.3 MMOL/L — SIGNIFICANT CHANGE UP (ref 3.5–5.3)
POTASSIUM SERPL-SCNC: 4.3 MMOL/L — SIGNIFICANT CHANGE UP (ref 3.5–5.3)
SODIUM SERPL-SCNC: 140 MMOL/L — SIGNIFICANT CHANGE UP (ref 135–145)

## 2020-01-22 PROCEDURE — 82140 ASSAY OF AMMONIA: CPT

## 2020-01-22 PROCEDURE — 80048 BASIC METABOLIC PNL TOTAL CA: CPT

## 2020-01-22 PROCEDURE — 85027 COMPLETE CBC AUTOMATED: CPT

## 2020-01-22 PROCEDURE — 87150 DNA/RNA AMPLIFIED PROBE: CPT

## 2020-01-22 PROCEDURE — 86780 TREPONEMA PALLIDUM: CPT

## 2020-01-22 PROCEDURE — 80053 COMPREHEN METABOLIC PANEL: CPT

## 2020-01-22 PROCEDURE — 71045 X-RAY EXAM CHEST 1 VIEW: CPT

## 2020-01-22 PROCEDURE — 99232 SBSQ HOSP IP/OBS MODERATE 35: CPT

## 2020-01-22 PROCEDURE — 99238 HOSP IP/OBS DSCHRG MGMT 30/<: CPT

## 2020-01-22 PROCEDURE — 99285 EMERGENCY DEPT VISIT HI MDM: CPT

## 2020-01-22 PROCEDURE — 87186 SC STD MICRODIL/AGAR DIL: CPT

## 2020-01-22 PROCEDURE — 87040 BLOOD CULTURE FOR BACTERIA: CPT

## 2020-01-22 RX ORDER — QUETIAPINE FUMARATE 200 MG/1
50 TABLET, FILM COATED ORAL AT BEDTIME
Refills: 0 | Status: DISCONTINUED | OUTPATIENT
Start: 2020-01-22 | End: 2020-01-22

## 2020-01-22 RX ORDER — ALPRAZOLAM 0.25 MG
1 TABLET ORAL
Qty: 0 | Refills: 0 | DISCHARGE

## 2020-01-22 RX ORDER — ACETAMINOPHEN 500 MG
0 TABLET ORAL
Qty: 0 | Refills: 0 | DISCHARGE

## 2020-01-22 RX ORDER — QUETIAPINE FUMARATE 200 MG/1
1 TABLET, FILM COATED ORAL
Qty: 0 | Refills: 0 | DISCHARGE
Start: 2020-01-22

## 2020-01-22 RX ORDER — LATANOPROST 0.05 MG/ML
1 SOLUTION/ DROPS OPHTHALMIC; TOPICAL
Qty: 0 | Refills: 0 | DISCHARGE

## 2020-01-22 RX ORDER — ALPRAZOLAM 0.25 MG
0.5 TABLET ORAL
Qty: 0 | Refills: 0 | DISCHARGE

## 2020-01-22 RX ORDER — QUETIAPINE FUMARATE 200 MG/1
25 TABLET, FILM COATED ORAL DAILY
Refills: 0 | Status: DISCONTINUED | OUTPATIENT
Start: 2020-01-23 | End: 2020-01-22

## 2020-01-22 RX ORDER — ALPRAZOLAM 0.25 MG
0.12 TABLET ORAL
Qty: 0 | Refills: 0 | DISCHARGE
Start: 2020-01-22

## 2020-01-22 RX ORDER — ALPRAZOLAM 0.25 MG
0.12 TABLET ORAL THREE TIMES A DAY
Refills: 0 | Status: DISCONTINUED | OUTPATIENT
Start: 2020-01-22 | End: 2020-01-22

## 2020-01-22 RX ADMIN — Medication 0.25 MILLIGRAM(S): at 05:31

## 2020-01-22 RX ADMIN — Medication 50 MILLIGRAM(S): at 14:32

## 2020-01-22 RX ADMIN — LAMOTRIGINE 100 MILLIGRAM(S): 25 TABLET, ORALLY DISINTEGRATING ORAL at 17:08

## 2020-01-22 RX ADMIN — LAMOTRIGINE 100 MILLIGRAM(S): 25 TABLET, ORALLY DISINTEGRATING ORAL at 05:31

## 2020-01-22 RX ADMIN — Medication 0.12 MILLIGRAM(S): at 14:32

## 2020-01-22 RX ADMIN — AMLODIPINE BESYLATE 5 MILLIGRAM(S): 2.5 TABLET ORAL at 05:31

## 2020-01-22 RX ADMIN — DIVALPROEX SODIUM 750 MILLIGRAM(S): 500 TABLET, DELAYED RELEASE ORAL at 05:31

## 2020-01-22 RX ADMIN — Medication 50 MILLIGRAM(S): at 05:31

## 2020-01-22 RX ADMIN — Medication 1 MILLIGRAM(S): at 14:32

## 2020-01-22 RX ADMIN — DIVALPROEX SODIUM 750 MILLIGRAM(S): 500 TABLET, DELAYED RELEASE ORAL at 17:08

## 2020-01-22 NOTE — DISCHARGE NOTE PROVIDER - CARE PROVIDER_API CALL
Primary Care Provider,   Phone: (   )    -  Fax: (   )    -  Follow Up Time: Primary Care Provider,   Phone: (   )    -  Fax: (   )    -  Follow Up Time:     Edin Mars (MD)  Infectious Disease; Internal Medicine  [address] [place] [address]  Phone: [phone]  Fax: [phone]  Follow Up Time:

## 2020-01-22 NOTE — PROGRESS NOTE ADULT - SUBJECTIVE AND OBJECTIVE BOX
CC: F/U for positive blood culture    Saw/spoke to patient. No fevers, no chills. No new complaints. Unchanged.    Allergies  No Known Allergies    ANTIMICROBIALS:  Off    PE:    Vital Signs Last 24 Hrs  T(C): 36.7 (22 Jan 2020 12:59), Max: 36.7 (21 Jan 2020 16:49)  T(F): 98.1 (22 Jan 2020 12:59), Max: 98.1 (21 Jan 2020 20:39)  HR: 69 (22 Jan 2020 12:59) (69 - 78)  BP: 109/68 (22 Jan 2020 12:59) (109/68 - 143/69)  RR: 17 (22 Jan 2020 12:59) (16 - 17)  SpO2: 92% (22 Jan 2020 12:59) (92% - 96%)    Gen: AOx2-3, fatigued, chronically ill appearing  CV: S1+S2 normal, nontachycardic  Resp: Clear bilat, no resp distress, no crackles/wheezes  Abd: Soft, nontender, +BS  Ext: No LE edema, no wounds    LABS:                        15.1   8.63  )-----------( 206      ( 21 Jan 2020 02:54 )             44.4     01-22    140  |  106  |  15  ----------------------------<  88  4.3   |  21<L>  |  0.68    Ca    8.4      22 Jan 2020 06:55    MICROBIOLOGY:    .Blood Blood-Peripheral  01-21-20   No growth to date.    .Blood Blood  01-19-20   Growth in aerobic bottle: Staphylococcus capitis  Susceptibility to follow.  --  Blood Culture PCR    .Blood Blood-Peripheral  01-19-20   No growth to date.     RADIOLOGY:    1/19 CXR:    IMPRESSION:  Suboptimally inflated but otherwise clear lungs. No pleural effusions or pneumothorax.    Stable cardiac and mediastinal silhouette which do not appear enlarged. Aortic arch calcifications again noted.    Trachea midline.    Stable upper thoracic posterior spinal fusion hardware.    Right-sided  shunt tubing again seen.    Nonspecific gas distended colon noted in visualized upper abdomen. No subdiaphragmatic free air.

## 2020-01-22 NOTE — PROGRESS NOTE ADULT - REASON FOR ADMISSION
The patient is a 69y Male was sent because of altered mental status.

## 2020-01-22 NOTE — DISCHARGE NOTE PROVIDER - HOSPITAL COURSE
70 y/o M with PMHx of TBI,  shunt placed 2/2017, presents with AMS. Pt was transferred from [place] s/p CTH showed worsening hydrocephalus. Pt was taken to [place] for AMS which was noticed by NH staff around 12. Pt seen by NeuroSx, Nurology and ID:  from a facility with encephalopathy and CT finds of ventriculomegaly, no concern for acute shunt failure given symptoms at this time. Patient is wide awake. Additionally, CT scan findings do show ventriculomegaly, however there is worsening general cerebral encephalomalacia so this may be 2/2 ex-vacuo changes. Pt's mental status now appears at baseline. Per ID: no fever, no leukocytosis, initially with AMS, and appears to have improved. BCXs 2/4 CoNS-suspect contaminant? Repeat BCx currently negative. Will observe off Abx.     Pt cleared for DC by Dr. Herrera with follow up with PCP.

## 2020-01-22 NOTE — DISCHARGE NOTE PROVIDER - NSDCCPCAREPLAN_GEN_ALL_CORE_FT
PRINCIPAL DISCHARGE DIAGNOSIS  Diagnosis: Altered mental status  Assessment and Plan of Treatment: Pt seen by NeuroSx, Nurology and ID:  from facility with encephalopathy and CT finds of ventriculomegaly, no concern for acute shunt failure given symptoms at this time. Patient is wide awake. Additionally, CT scan findings do show ventriculomegaly, however there is worsening general cerebral encephalomalacia so this may be 2/2 ex-vacuo changes. Pt's mental status now appears at baseline. Per ID: no fever, no leukocytosis, initially with AMS, and appears to have improved. BCXs 2/4 CoNS-suspect contaminant? Repeat BCx currently negative. Will observe off Abx.      SECONDARY DISCHARGE DIAGNOSES  Diagnosis: Dementia with behavioral disturbance, unspecified dementia type  Assessment and Plan of Treatment: Continue with current management. PRINCIPAL DISCHARGE DIAGNOSIS  Diagnosis: Altered mental status  Assessment and Plan of Treatment: Pt seen by NeuroSx, Nurology and ID:  from facility with encephalopathy and CT finds of ventriculomegaly, no concern for acute shunt failure given symptoms at this time. Patient is wide awake. Additionally, CT scan findings do show ventriculomegaly, however there is worsening general cerebral encephalomalacia so this may be 2/2 ex-vacuo changes. Pt's mental status now appears at baseline. Per ID: no fever, no leukocytosis, initially with AMS, and appears to have improved. BCXs 2/4 CoNS-suspect contaminant? Repeat BCx currently negative. Will observe off Abx.   Per ID: Repeat blood cultures at [facility] on 1/28 and communicate results to Edin Mak.      SECONDARY DISCHARGE DIAGNOSES  Diagnosis: Dementia with behavioral disturbance, unspecified dementia type  Assessment and Plan of Treatment: Continue with current management.

## 2020-01-22 NOTE — PROGRESS NOTE ADULT - ASSESSMENT
68 yo M PMHx of TBI,  shunt placed 2/2017, presents with AMS  No fever, no leukocytosis  Initially with AMS, and appears to have improved today  BCXs 2/4 CoNS--suspect contam?  Note repeat BCXs were actually drawn after vancomycin  Neuro believes mental status back to baseline  Patient does not appear to have other foreign body to provide focus of infection aside from shunt and thoracic hardware  Repeat BCXs NGTD  Overall,  1) CoNS in BCX  - Continue off abx for now  - F/U pending BCXs  - Further antibiotic plan depending on culture results  2) AMS  - Monitor mental status for any worsening  - Defer to neuro/neurosurgery concerns regarding hydrocephalus  - If signs sepsis, would need to consider sampling spinal fluid    Would have patient repeat BCXs as outpatient in 1 week (can have at ID clinic with me [phone])  Discussed with medicine team    Edin Mars MD  Pager [phone]  After 5pm and on weekends call [phone]

## 2020-01-22 NOTE — PROGRESS NOTE ADULT - ASSESSMENT
The patient is a 69y Male was sent because of altered mental status.    Encephalopathy:  Neuro f/up noted.  Mental status baseline    Dementia with Agitation;  Cont Depakote/Xanax/Seroquel/Lamictal  Psych f/up.    Bacteremia:  ID f/up noted.  Repeat Bl Cx: Neg

## 2020-01-22 NOTE — DISCHARGE NOTE NURSING/CASE MANAGEMENT/SOCIAL WORK - PATIENT PORTAL LINK FT
You can access the FollowMyHealth Patient Portal offered by [facility] by registering at the following website: [email or web address]. By joining [employer]’s FollowMyHealth portal, you will also be able to view your health information using other applications (apps) compatible with our system.

## 2020-01-22 NOTE — DISCHARGE NOTE PROVIDER - PROVIDER TOKENS
FREE:[LAST:[Primary Care Provider],PHONE:[(   )    -],FAX:[(   )    -]] FREE:[LAST:[Primary Care Provider],PHONE:[(   )    -],FAX:[(   )    -]],PROVIDER:[TOKEN:[[id number]]]

## 2020-01-22 NOTE — PROVIDER CONTACT NOTE (CRITICAL VALUE NOTIFICATION) - TEST AND RESULT REPORTED:
growth in aerobic bottle staph capitus, susceptibility to follow  growth in anaerobic bottle gram + cocci in clusters

## 2020-01-22 NOTE — DISCHARGE NOTE PROVIDER - NSDCMRMEDTOKEN_GEN_ALL_CORE_FT
acetaminophen 500 mg oral tablet: 2 tab(s) orally every 4 hours, As Needed  amLODIPine 5 mg oral tablet: 1 tab(s) orally once a day  bacitracin 500 units/g topical ointment: apply by topical route once daily and as needed. Clean back of (L)hand with saline dry apply bacitracin followed by a dry dressing  divalproex sodium 125 mg oral delayed release capsule: 6 cap(s) orally every 12 hours  docusate sodium 100 mg oral capsule: 2 cap(s) orally once a day (at bedtime)  folic acid 1 mg oral tablet: 1 tab(s) orally once a day  hydrALAZINE 50 mg oral tablet: 50 milligram(s) orally 3 times a day  lamoTRIgine 100 mg oral tablet: 1 tab(s) orally 2 times a day  latanoprost 0.005% ophthalmic solution: 1 drop(s) to each affected eye once a day (at bedtime)  lidocaine 4% topical film: Apply topically to affected area once a day to bilateral knees MDD: 2 patches  PriLOSEC 20 mg oral delayed release capsule: 1 cap(s) orally 2 times a day  QUEtiapine 25 mg oral tablet: 2 tab(s) orally once a day (at bedtime)  SEROquel 25 mg oral tablet: 1 tab(s) orally once a day (in the morning)  Travatan Z 0.004% ophthalmic solution: 1 drop(s) to each affected eye once a day (in the evening)  Xanax 0.25 mg oral tablet: 0.5 tab(s) orally 3 times a day acetaminophen 500 mg oral tablet: 2 tab(s) orally every 4 hours, As Needed  ALPRAZolam: 0.125 milligram(s) orally 3 times a day  amLODIPine 5 mg oral tablet: 1 tab(s) orally once a day  bacitracin 500 units/g topical ointment: apply by topical route once daily and as needed. Clean back of (L)hand with saline dry apply bacitracin followed by a dry dressing  divalproex sodium 125 mg oral delayed release capsule: 6 cap(s) orally every 12 hours  docusate sodium 100 mg oral capsule: 2 cap(s) orally once a day (at bedtime)  folic acid 1 mg oral tablet: 1 tab(s) orally once a day  hydrALAZINE 50 mg oral tablet: 50 milligram(s) orally 3 times a day  lamoTRIgine 100 mg oral tablet: 1 tab(s) orally 2 times a day  latanoprost 0.005% ophthalmic solution: 1 drop(s) to each affected eye once a day (at bedtime)  lidocaine 4% topical film: Apply topically to affected area once a day to bilateral knees MDD: 2 patches  PriLOSEC 20 mg oral delayed release capsule: 1 cap(s) orally 2 times a day  QUEtiapine 50 mg oral tablet: 1 tab(s) orally once a day (at bedtime)  SEROquel 25 mg oral tablet: 1 tab(s) orally once a day (in the morning)  Travatan Z 0.004% ophthalmic solution: 1 drop(s) to each affected eye once a day (in the evening)

## 2020-01-22 NOTE — PHARMACOTHERAPY INTERVENTION NOTE - COMMENTS
Confirmed home medications with patient medical record rehab facility at [facility], updated in Outpatient Medication Review. Communicated discrepancies with PA and discussed with attending.    Igor Blackmon  PGY-1 Pharmacy Resident  Pager: [phone] Confirmed home medications with patient medical record rehab facility at [facility], updated in Outpatient Medication Review. Communicated discrepancies regarding dosage of Seroquel and Xanax patient is currently receiving here compared to rehab facility with PA and discussed with attending.    Igor Blackmon  PGY-1 Pharmacy Resident  Pager: [phone]

## 2020-01-23 LAB
-  AMOXICILLIN/CLAVULANIC ACID: SIGNIFICANT CHANGE UP
-  AMPICILLIN/SULBACTAM: SIGNIFICANT CHANGE UP
-  AMPICILLIN: SIGNIFICANT CHANGE UP
-  CEFAZOLIN: SIGNIFICANT CHANGE UP
-  CEFTRIAXONE: SIGNIFICANT CHANGE UP
-  CIPROFLOXACIN: SIGNIFICANT CHANGE UP
-  CLINDAMYCIN: SIGNIFICANT CHANGE UP
-  DAPTOMYCIN: SIGNIFICANT CHANGE UP
-  ERYTHROMYCIN: SIGNIFICANT CHANGE UP
-  GENTAMICIN: SIGNIFICANT CHANGE UP
-  LEVOFLOXACIN: SIGNIFICANT CHANGE UP
-  LINEZOLID: SIGNIFICANT CHANGE UP
-  MEROPENEM: SIGNIFICANT CHANGE UP
-  MOXIFLOXACIN(AEROBIC): SIGNIFICANT CHANGE UP
-  OXACILLIN: SIGNIFICANT CHANGE UP
-  PENICILLIN: SIGNIFICANT CHANGE UP
-  RIFAMPIN: SIGNIFICANT CHANGE UP
-  TETRACYCLINE: SIGNIFICANT CHANGE UP
-  TRIMETHOPRIM/SULFAMETHOXAZOLE: SIGNIFICANT CHANGE UP
-  VANCOMYCIN: SIGNIFICANT CHANGE UP
CULTURE RESULTS: SIGNIFICANT CHANGE UP
METHOD TYPE: SIGNIFICANT CHANGE UP
ORGANISM # SPEC MICROSCOPIC CNT: SIGNIFICANT CHANGE UP
ORGANISM # SPEC MICROSCOPIC CNT: SIGNIFICANT CHANGE UP
SPECIMEN SOURCE: SIGNIFICANT CHANGE UP

## 2020-01-24 LAB
CULTURE RESULTS: SIGNIFICANT CHANGE UP
CULTURE RESULTS: SIGNIFICANT CHANGE UP
SPECIMEN SOURCE: SIGNIFICANT CHANGE UP
SPECIMEN SOURCE: SIGNIFICANT CHANGE UP

## 2020-01-28 DIAGNOSIS — R53.1 WEAKNESS: ICD-10-CM

## 2020-05-07 ENCOUNTER — INPATIENT (INPATIENT)
Facility: HOSPITAL | Age: 70
LOS: 10 days | Discharge: HOSPICE | DRG: 871 | End: 2020-05-18
Attending: INTERNAL MEDICINE | Admitting: INTERNAL MEDICINE
Payer: MEDICARE

## 2020-05-07 VITALS
WEIGHT: 149.91 LBS | DIASTOLIC BLOOD PRESSURE: 66 MMHG | OXYGEN SATURATION: 79 % | HEART RATE: 112 BPM | TEMPERATURE: 99 F | RESPIRATION RATE: 19 BRPM | SYSTOLIC BLOOD PRESSURE: 92 MMHG | HEIGHT: 70 IN

## 2020-05-07 DIAGNOSIS — Z98.890 OTHER SPECIFIED POSTPROCEDURAL STATES: Chronic | ICD-10-CM

## 2020-05-07 DIAGNOSIS — J96.01 ACUTE RESPIRATORY FAILURE WITH HYPOXIA: ICD-10-CM

## 2020-05-07 PROBLEM — S06.9X0A UNSPECIFIED INTRACRANIAL INJURY WITHOUT LOSS OF CONSCIOUSNESS, INITIAL ENCOUNTER: Chronic | Status: ACTIVE | Noted: 2020-01-19

## 2020-05-07 LAB
ALBUMIN SERPL ELPH-MCNC: 2.5 G/DL — LOW (ref 3.3–5)
ALP SERPL-CCNC: 77 U/L — SIGNIFICANT CHANGE UP (ref 40–120)
ALT FLD-CCNC: 20 U/L — SIGNIFICANT CHANGE UP (ref 10–45)
ANION GAP SERPL CALC-SCNC: 15 MMOL/L — SIGNIFICANT CHANGE UP (ref 5–17)
ANION GAP SERPL CALC-SCNC: 8 MMOL/L — SIGNIFICANT CHANGE UP (ref 5–17)
APPEARANCE UR: CLEAR — SIGNIFICANT CHANGE UP
APTT BLD: 27.7 SEC — SIGNIFICANT CHANGE UP (ref 27.5–36.3)
AST SERPL-CCNC: 25 U/L — SIGNIFICANT CHANGE UP (ref 10–40)
BACTERIA # UR AUTO: ABNORMAL /HPF
BASOPHILS # BLD AUTO: 0.08 K/UL — SIGNIFICANT CHANGE UP (ref 0–0.2)
BASOPHILS NFR BLD AUTO: 0.4 % — SIGNIFICANT CHANGE UP (ref 0–2)
BILIRUB SERPL-MCNC: 0.5 MG/DL — SIGNIFICANT CHANGE UP (ref 0.2–1.2)
BILIRUB UR-MCNC: NEGATIVE — SIGNIFICANT CHANGE UP
BLOOD GAS COMMENTS ARTERIAL: SIGNIFICANT CHANGE UP
BUN SERPL-MCNC: 84 MG/DL — HIGH (ref 7–23)
BUN SERPL-MCNC: 92 MG/DL — HIGH (ref 7–23)
CALCIUM SERPL-MCNC: 8.9 MG/DL — SIGNIFICANT CHANGE UP (ref 8.4–10.5)
CALCIUM SERPL-MCNC: 8.9 MG/DL — SIGNIFICANT CHANGE UP (ref 8.4–10.5)
CHLORIDE SERPL-SCNC: 125 MMOL/L — HIGH (ref 96–108)
CHLORIDE SERPL-SCNC: 127 MMOL/L — HIGH (ref 96–108)
CO2 BLDA-SCNC: 22 MMOL/L — SIGNIFICANT CHANGE UP (ref 22–30)
CO2 BLDA-SCNC: 28 MMOL/L — SIGNIFICANT CHANGE UP (ref 22–30)
CO2 BLDA-SCNC: 30 MMOL/L — SIGNIFICANT CHANGE UP (ref 22–30)
CO2 SERPL-SCNC: 26 MMOL/L — SIGNIFICANT CHANGE UP (ref 22–31)
CO2 SERPL-SCNC: 33 MMOL/L — HIGH (ref 22–31)
COLOR SPEC: YELLOW — SIGNIFICANT CHANGE UP
COMMENT - URINE: SIGNIFICANT CHANGE UP
CREAT SERPL-MCNC: 2.28 MG/DL — HIGH (ref 0.5–1.3)
CREAT SERPL-MCNC: 2.33 MG/DL — HIGH (ref 0.5–1.3)
CRP SERPL-MCNC: 16.66 MG/DL — HIGH (ref 0–0.4)
D DIMER BLD IA.RAPID-MCNC: 8630 NG/ML DDU — SIGNIFICANT CHANGE UP
DIFF PNL FLD: ABNORMAL
EOSINOPHIL # BLD AUTO: 0.11 K/UL — SIGNIFICANT CHANGE UP (ref 0–0.5)
EOSINOPHIL NFR BLD AUTO: 0.5 % — SIGNIFICANT CHANGE UP (ref 0–6)
EPI CELLS # UR: SIGNIFICANT CHANGE UP
FERRITIN SERPL-MCNC: 628 NG/ML — HIGH (ref 30–400)
GAS PNL BLDA: SIGNIFICANT CHANGE UP
GAS PNL BLDA: SIGNIFICANT CHANGE UP
GLUCOSE BLDC GLUCOMTR-MCNC: 118 MG/DL — HIGH (ref 70–99)
GLUCOSE BLDC GLUCOMTR-MCNC: 142 MG/DL — HIGH (ref 70–99)
GLUCOSE SERPL-MCNC: 153 MG/DL — HIGH (ref 70–99)
GLUCOSE SERPL-MCNC: 279 MG/DL — HIGH (ref 70–99)
GLUCOSE UR QL: NEGATIVE — SIGNIFICANT CHANGE UP
HCT VFR BLD CALC: 55.2 % — HIGH (ref 39–50)
HGB BLD-MCNC: 16.8 G/DL — SIGNIFICANT CHANGE UP (ref 13–17)
HOROWITZ INDEX BLDA+IHG-RTO: SIGNIFICANT CHANGE UP
IMM GRANULOCYTES NFR BLD AUTO: 0.8 % — SIGNIFICANT CHANGE UP (ref 0–1.5)
INR BLD: 1.31 RATIO — HIGH (ref 0.88–1.16)
KETONES UR-MCNC: ABNORMAL
LACTATE SERPL-SCNC: 8.3 MMOL/L — CRITICAL HIGH (ref 0.7–2)
LEUKOCYTE ESTERASE UR-ACNC: ABNORMAL
LYMPHOCYTES # BLD AUTO: 1.3 K/UL — SIGNIFICANT CHANGE UP (ref 1–3.3)
LYMPHOCYTES # BLD AUTO: 6 % — LOW (ref 13–44)
MCHC RBC-ENTMCNC: 30.4 GM/DL — LOW (ref 32–36)
MCHC RBC-ENTMCNC: 31.9 PG — SIGNIFICANT CHANGE UP (ref 27–34)
MCV RBC AUTO: 104.7 FL — HIGH (ref 80–100)
MONOCYTES # BLD AUTO: 0.65 K/UL — SIGNIFICANT CHANGE UP (ref 0–0.9)
MONOCYTES NFR BLD AUTO: 3 % — SIGNIFICANT CHANGE UP (ref 2–14)
NEUTROPHILS # BLD AUTO: 19.53 K/UL — HIGH (ref 1.8–7.4)
NEUTROPHILS NFR BLD AUTO: 89.3 % — HIGH (ref 43–77)
NITRITE UR-MCNC: NEGATIVE — SIGNIFICANT CHANGE UP
NRBC # BLD: 0 /100 WBCS — SIGNIFICANT CHANGE UP (ref 0–0)
NT-PROBNP SERPL-SCNC: 472 PG/ML — HIGH (ref 0–300)
PCO2 BLDA: 34 MMHG — SIGNIFICANT CHANGE UP (ref 32–46)
PCO2 BLDA: 68 MMHG — HIGH (ref 32–46)
PCO2 BLDA: 75 MMHG — CRITICAL HIGH (ref 32–46)
PH BLDA: 7.19 — CRITICAL LOW (ref 7.35–7.45)
PH BLDA: 7.21 — LOW (ref 7.35–7.45)
PH BLDA: 7.41 — SIGNIFICANT CHANGE UP (ref 7.35–7.45)
PH UR: 5 — SIGNIFICANT CHANGE UP (ref 5–8)
PLATELET # BLD AUTO: 275 K/UL — SIGNIFICANT CHANGE UP (ref 150–400)
PO2 BLDA: 103 MMHG — SIGNIFICANT CHANGE UP (ref 74–108)
PO2 BLDA: 50 MMHG — CRITICAL LOW (ref 74–108)
PO2 BLDA: 90 MMHG — SIGNIFICANT CHANGE UP (ref 74–108)
POTASSIUM SERPL-MCNC: 4.8 MMOL/L — SIGNIFICANT CHANGE UP (ref 3.5–5.3)
POTASSIUM SERPL-MCNC: 5.4 MMOL/L — HIGH (ref 3.5–5.3)
POTASSIUM SERPL-SCNC: 4.8 MMOL/L — SIGNIFICANT CHANGE UP (ref 3.5–5.3)
POTASSIUM SERPL-SCNC: 5.4 MMOL/L — HIGH (ref 3.5–5.3)
PROCALCITONIN SERPL-MCNC: 1.24 NG/ML — HIGH
PROT SERPL-MCNC: 7.2 G/DL — SIGNIFICANT CHANGE UP (ref 6–8.3)
PROT UR-MCNC: 30 MG/DL
PROTHROM AB SERPL-ACNC: 14.8 SEC — HIGH (ref 10–12.9)
RBC # BLD: 5.27 M/UL — SIGNIFICANT CHANGE UP (ref 4.2–5.8)
RBC # FLD: 16.6 % — HIGH (ref 10.3–14.5)
RBC CASTS # UR COMP ASSIST: ABNORMAL /HPF (ref 0–4)
SAO2 % BLDA: 83 % — LOW (ref 92–96)
SAO2 % BLDA: 93 % — SIGNIFICANT CHANGE UP (ref 92–96)
SAO2 % BLDA: 96 % — SIGNIFICANT CHANGE UP (ref 92–96)
SARS-COV-2 RNA SPEC QL NAA+PROBE: SIGNIFICANT CHANGE UP
SODIUM SERPL-SCNC: 166 MMOL/L — CRITICAL HIGH (ref 135–145)
SODIUM SERPL-SCNC: 168 MMOL/L — CRITICAL HIGH (ref 135–145)
SP GR SPEC: 1.02 — SIGNIFICANT CHANGE UP (ref 1.01–1.02)
TROPONIN I SERPL-MCNC: <.017 NG/ML — LOW (ref 0.02–0.06)
UROBILINOGEN FLD QL: NEGATIVE — SIGNIFICANT CHANGE UP
WBC # BLD: 21.84 K/UL — HIGH (ref 3.8–10.5)
WBC # FLD AUTO: 21.84 K/UL — HIGH (ref 3.8–10.5)
WBC UR QL: SIGNIFICANT CHANGE UP /HPF (ref 0–5)

## 2020-05-07 PROCEDURE — 70450 CT HEAD/BRAIN W/O DYE: CPT | Mod: 26

## 2020-05-07 PROCEDURE — 31500 INSERT EMERGENCY AIRWAY: CPT

## 2020-05-07 PROCEDURE — 71045 X-RAY EXAM CHEST 1 VIEW: CPT | Mod: 26

## 2020-05-07 PROCEDURE — 71045 X-RAY EXAM CHEST 1 VIEW: CPT | Mod: 26,77

## 2020-05-07 PROCEDURE — 99291 CRITICAL CARE FIRST HOUR: CPT | Mod: CS,25

## 2020-05-07 PROCEDURE — 93010 ELECTROCARDIOGRAM REPORT: CPT

## 2020-05-07 RX ORDER — DEXTROSE 50 % IN WATER 50 %
25 SYRINGE (ML) INTRAVENOUS ONCE
Refills: 0 | Status: DISCONTINUED | OUTPATIENT
Start: 2020-05-07 | End: 2020-05-18

## 2020-05-07 RX ORDER — VANCOMYCIN HCL 1 G
1000 VIAL (EA) INTRAVENOUS ONCE
Refills: 0 | Status: COMPLETED | OUTPATIENT
Start: 2020-05-07 | End: 2020-05-07

## 2020-05-07 RX ORDER — LAMOTRIGINE 25 MG/1
100 TABLET, ORALLY DISINTEGRATING ORAL
Refills: 0 | Status: DISCONTINUED | OUTPATIENT
Start: 2020-05-07 | End: 2020-05-15

## 2020-05-07 RX ORDER — SODIUM CHLORIDE 9 MG/ML
1000 INJECTION, SOLUTION INTRAVENOUS
Refills: 0 | Status: DISCONTINUED | OUTPATIENT
Start: 2020-05-07 | End: 2020-05-13

## 2020-05-07 RX ORDER — ACETAMINOPHEN 500 MG
975 TABLET ORAL EVERY 6 HOURS
Refills: 0 | Status: DISCONTINUED | OUTPATIENT
Start: 2020-05-07 | End: 2020-05-15

## 2020-05-07 RX ORDER — SODIUM CHLORIDE 9 MG/ML
1000 INJECTION, SOLUTION INTRAVENOUS
Refills: 0 | Status: DISCONTINUED | OUTPATIENT
Start: 2020-05-07 | End: 2020-05-18

## 2020-05-07 RX ORDER — APIXABAN 2.5 MG/1
2.5 TABLET, FILM COATED ORAL EVERY 12 HOURS
Refills: 0 | Status: DISCONTINUED | OUTPATIENT
Start: 2020-05-07 | End: 2020-05-08

## 2020-05-07 RX ORDER — SODIUM CHLORIDE 9 MG/ML
1600 INJECTION INTRAMUSCULAR; INTRAVENOUS; SUBCUTANEOUS ONCE
Refills: 0 | Status: COMPLETED | OUTPATIENT
Start: 2020-05-07 | End: 2020-05-07

## 2020-05-07 RX ORDER — ETOMIDATE 2 MG/ML
20 INJECTION INTRAVENOUS ONCE
Refills: 0 | Status: COMPLETED | OUTPATIENT
Start: 2020-05-07 | End: 2020-05-07

## 2020-05-07 RX ORDER — DEXTROSE 50 % IN WATER 50 %
12.5 SYRINGE (ML) INTRAVENOUS ONCE
Refills: 0 | Status: DISCONTINUED | OUTPATIENT
Start: 2020-05-07 | End: 2020-05-18

## 2020-05-07 RX ORDER — SODIUM CHLORIDE 9 MG/ML
500 INJECTION INTRAMUSCULAR; INTRAVENOUS; SUBCUTANEOUS ONCE
Refills: 0 | Status: COMPLETED | OUTPATIENT
Start: 2020-05-07 | End: 2020-05-07

## 2020-05-07 RX ORDER — INSULIN LISPRO 100/ML
VIAL (ML) SUBCUTANEOUS
Refills: 0 | Status: DISCONTINUED | OUTPATIENT
Start: 2020-05-07 | End: 2020-05-08

## 2020-05-07 RX ORDER — LATANOPROST 0.05 MG/ML
1 SOLUTION/ DROPS OPHTHALMIC; TOPICAL AT BEDTIME
Refills: 0 | Status: DISCONTINUED | OUTPATIENT
Start: 2020-05-07 | End: 2020-05-15

## 2020-05-07 RX ORDER — CEFEPIME 1 G/1
1000 INJECTION, POWDER, FOR SOLUTION INTRAMUSCULAR; INTRAVENOUS EVERY 24 HOURS
Refills: 0 | Status: DISCONTINUED | OUTPATIENT
Start: 2020-05-07 | End: 2020-05-07

## 2020-05-07 RX ORDER — SODIUM CHLORIDE 9 MG/ML
1000 INJECTION, SOLUTION INTRAVENOUS ONCE
Refills: 0 | Status: COMPLETED | OUTPATIENT
Start: 2020-05-07 | End: 2020-05-07

## 2020-05-07 RX ORDER — ALPRAZOLAM 0.25 MG
0.12 TABLET ORAL THREE TIMES A DAY
Refills: 0 | Status: DISCONTINUED | OUTPATIENT
Start: 2020-05-07 | End: 2020-05-14

## 2020-05-07 RX ORDER — PROPOFOL 10 MG/ML
10 INJECTION, EMULSION INTRAVENOUS
Qty: 1000 | Refills: 0 | Status: DISCONTINUED | OUTPATIENT
Start: 2020-05-07 | End: 2020-05-09

## 2020-05-07 RX ORDER — HEPARIN SODIUM 5000 [USP'U]/ML
5000 INJECTION INTRAVENOUS; SUBCUTANEOUS EVERY 8 HOURS
Refills: 0 | Status: DISCONTINUED | OUTPATIENT
Start: 2020-05-07 | End: 2020-05-07

## 2020-05-07 RX ORDER — CEFEPIME 1 G/1
INJECTION, POWDER, FOR SOLUTION INTRAMUSCULAR; INTRAVENOUS
Refills: 0 | Status: DISCONTINUED | OUTPATIENT
Start: 2020-05-07 | End: 2020-05-11

## 2020-05-07 RX ORDER — FAMOTIDINE 10 MG/ML
20 INJECTION INTRAVENOUS DAILY
Refills: 0 | Status: DISCONTINUED | OUTPATIENT
Start: 2020-05-07 | End: 2020-05-15

## 2020-05-07 RX ORDER — CEFEPIME 1 G/1
1000 INJECTION, POWDER, FOR SOLUTION INTRAMUSCULAR; INTRAVENOUS DAILY
Refills: 0 | Status: DISCONTINUED | OUTPATIENT
Start: 2020-05-08 | End: 2020-05-11

## 2020-05-07 RX ORDER — ACETAMINOPHEN 500 MG
650 TABLET ORAL ONCE
Refills: 0 | Status: COMPLETED | OUTPATIENT
Start: 2020-05-07 | End: 2020-05-07

## 2020-05-07 RX ORDER — CEFEPIME 1 G/1
1000 INJECTION, POWDER, FOR SOLUTION INTRAMUSCULAR; INTRAVENOUS ONCE
Refills: 0 | Status: COMPLETED | OUTPATIENT
Start: 2020-05-07 | End: 2020-05-07

## 2020-05-07 RX ORDER — HYDRALAZINE HCL 50 MG
50 TABLET ORAL
Qty: 0 | Refills: 0 | DISCHARGE

## 2020-05-07 RX ORDER — CHLORHEXIDINE GLUCONATE 213 G/1000ML
15 SOLUTION TOPICAL EVERY 12 HOURS
Refills: 0 | Status: DISCONTINUED | OUTPATIENT
Start: 2020-05-07 | End: 2020-05-09

## 2020-05-07 RX ORDER — INSULIN HUMAN 100 [IU]/ML
10 INJECTION, SOLUTION SUBCUTANEOUS ONCE
Refills: 0 | Status: COMPLETED | OUTPATIENT
Start: 2020-05-07 | End: 2020-05-07

## 2020-05-07 RX ORDER — SODIUM CHLORIDE 9 MG/ML
1000 INJECTION, SOLUTION INTRAVENOUS
Refills: 0 | Status: DISCONTINUED | OUTPATIENT
Start: 2020-05-07 | End: 2020-05-07

## 2020-05-07 RX ORDER — GLUCAGON INJECTION, SOLUTION 0.5 MG/.1ML
1 INJECTION, SOLUTION SUBCUTANEOUS ONCE
Refills: 0 | Status: DISCONTINUED | OUTPATIENT
Start: 2020-05-07 | End: 2020-05-10

## 2020-05-07 RX ORDER — DIVALPROEX SODIUM 500 MG/1
750 TABLET, DELAYED RELEASE ORAL EVERY 12 HOURS
Refills: 0 | Status: DISCONTINUED | OUTPATIENT
Start: 2020-05-07 | End: 2020-05-15

## 2020-05-07 RX ORDER — NOREPINEPHRINE BITARTRATE/D5W 8 MG/250ML
0.05 PLASTIC BAG, INJECTION (ML) INTRAVENOUS
Qty: 8 | Refills: 0 | Status: DISCONTINUED | OUTPATIENT
Start: 2020-05-07 | End: 2020-05-09

## 2020-05-07 RX ORDER — ETOMIDATE 2 MG/ML
10 INJECTION INTRAVENOUS ONCE
Refills: 0 | Status: COMPLETED | OUTPATIENT
Start: 2020-05-07 | End: 2020-05-07

## 2020-05-07 RX ORDER — DEXTROSE 50 % IN WATER 50 %
50 SYRINGE (ML) INTRAVENOUS ONCE
Refills: 0 | Status: COMPLETED | OUTPATIENT
Start: 2020-05-07 | End: 2020-05-07

## 2020-05-07 RX ORDER — DEXTROSE 50 % IN WATER 50 %
15 SYRINGE (ML) INTRAVENOUS ONCE
Refills: 0 | Status: DISCONTINUED | OUTPATIENT
Start: 2020-05-07 | End: 2020-05-18

## 2020-05-07 RX ORDER — QUETIAPINE FUMARATE 200 MG/1
1 TABLET, FILM COATED ORAL
Qty: 0 | Refills: 0 | DISCHARGE

## 2020-05-07 RX ORDER — CEFEPIME 1 G/1
2000 INJECTION, POWDER, FOR SOLUTION INTRAMUSCULAR; INTRAVENOUS ONCE
Refills: 0 | Status: COMPLETED | OUTPATIENT
Start: 2020-05-07 | End: 2020-05-07

## 2020-05-07 RX ORDER — SUCCINYLCHOLINE CHLORIDE 100 MG/5ML
100 SYRINGE (ML) INTRAVENOUS ONCE
Refills: 0 | Status: COMPLETED | OUTPATIENT
Start: 2020-05-07 | End: 2020-05-07

## 2020-05-07 RX ORDER — CHLORHEXIDINE GLUCONATE 213 G/1000ML
1 SOLUTION TOPICAL
Refills: 0 | Status: DISCONTINUED | OUTPATIENT
Start: 2020-05-07 | End: 2020-05-14

## 2020-05-07 RX ADMIN — SODIUM CHLORIDE 100 MILLILITER(S): 9 INJECTION, SOLUTION INTRAVENOUS at 17:05

## 2020-05-07 RX ADMIN — APIXABAN 2.5 MILLIGRAM(S): 2.5 TABLET, FILM COATED ORAL at 17:35

## 2020-05-07 RX ADMIN — CEFEPIME 100 MILLIGRAM(S): 1 INJECTION, POWDER, FOR SOLUTION INTRAMUSCULAR; INTRAVENOUS at 22:03

## 2020-05-07 RX ADMIN — ETOMIDATE 20 MILLIGRAM(S): 2 INJECTION INTRAVENOUS at 13:56

## 2020-05-07 RX ADMIN — PROPOFOL 4.08 MICROGRAM(S)/KG/MIN: 10 INJECTION, EMULSION INTRAVENOUS at 20:31

## 2020-05-07 RX ADMIN — SODIUM CHLORIDE 500 MILLILITER(S): 9 INJECTION INTRAMUSCULAR; INTRAVENOUS; SUBCUTANEOUS at 18:55

## 2020-05-07 RX ADMIN — Medication 0.12 MILLIGRAM(S): at 20:32

## 2020-05-07 RX ADMIN — SODIUM CHLORIDE 1000 MILLILITER(S): 9 INJECTION, SOLUTION INTRAVENOUS at 20:06

## 2020-05-07 RX ADMIN — ETOMIDATE 10 MILLIGRAM(S): 2 INJECTION INTRAVENOUS at 13:56

## 2020-05-07 RX ADMIN — LATANOPROST 1 DROP(S): 0.05 SOLUTION/ DROPS OPHTHALMIC; TOPICAL at 21:05

## 2020-05-07 RX ADMIN — SODIUM CHLORIDE 125 MILLILITER(S): 9 INJECTION, SOLUTION INTRAVENOUS at 21:14

## 2020-05-07 RX ADMIN — PROPOFOL 4.08 MICROGRAM(S)/KG/MIN: 10 INJECTION, EMULSION INTRAVENOUS at 14:27

## 2020-05-07 RX ADMIN — PROPOFOL 4.08 MICROGRAM(S)/KG/MIN: 10 INJECTION, EMULSION INTRAVENOUS at 22:29

## 2020-05-07 RX ADMIN — DIVALPROEX SODIUM 750 MILLIGRAM(S): 500 TABLET, DELAYED RELEASE ORAL at 17:35

## 2020-05-07 RX ADMIN — PROPOFOL 4.08 MICROGRAM(S)/KG/MIN: 10 INJECTION, EMULSION INTRAVENOUS at 16:09

## 2020-05-07 RX ADMIN — Medication 650 MILLIGRAM(S): at 14:32

## 2020-05-07 RX ADMIN — Medication 250 MILLIGRAM(S): at 17:35

## 2020-05-07 RX ADMIN — Medication 100 MILLIGRAM(S): at 13:57

## 2020-05-07 RX ADMIN — CHLORHEXIDINE GLUCONATE 15 MILLILITER(S): 213 SOLUTION TOPICAL at 18:54

## 2020-05-07 RX ADMIN — LAMOTRIGINE 100 MILLIGRAM(S): 25 TABLET, ORALLY DISINTEGRATING ORAL at 17:34

## 2020-05-07 RX ADMIN — Medication 975 MILLIGRAM(S): at 19:31

## 2020-05-07 RX ADMIN — CEFEPIME 100 MILLIGRAM(S): 1 INJECTION, POWDER, FOR SOLUTION INTRAMUSCULAR; INTRAVENOUS at 14:52

## 2020-05-07 RX ADMIN — SODIUM CHLORIDE 1600 MILLILITER(S): 9 INJECTION INTRAMUSCULAR; INTRAVENOUS; SUBCUTANEOUS at 13:54

## 2020-05-07 NOTE — ED PROVIDER NOTE - CONSULTANT FREE TEXT FOR MDM DISCUSSED CASE WITH QUESTION
Left message for Dr. Cotton for possible ethics consult. D/w Aleksandra Meehan and Colletta re: pt with TBI, dementia, A&Ox1, p/w respiratory failure, +COVID 3 weeks ago. Pt is full code per documentation, hypoxic despite NRB + NC simultaneously, will need intubation but given pt's comorbidities and current status, very poor prognosis. D/w family who requests intubation despite poor prognosis. Aleksandra Meehan and Colletta agree with proceeding with family's wishes at this time.

## 2020-05-07 NOTE — ED ADULT NURSE NOTE - NSIMPLEMENTINTERV_GEN_ALL_ED
Implemented All Fall Risk Interventions:  [place] to call system. Call bell, personal items and telephone within reach. Instruct patient to call for assistance. Room bathroom lighting operational. Non-slip footwear when patient is off stretcher. Physically safe environment: no spills, clutter or unnecessary equipment. Stretcher in lowest position, wheels locked, appropriate side rails in place. Provide visual cue, wrist band, yellow gown, etc. Monitor gait and stability. Monitor for mental status changes and reorient to person, place, and time. Review medications for side effects contributing to fall risk. Reinforce activity limits and safety measures with patient and family.

## 2020-05-07 NOTE — ED PROVIDER NOTE - CARE PLAN
Principal Discharge DX:	Acute respiratory failure with hypoxia Principal Discharge DX:	Acute respiratory failure with hypoxia  Secondary Diagnosis:	Hypernatremia  Secondary Diagnosis:	Severe sepsis Principal Discharge DX:	Acute respiratory failure with hypoxia  Secondary Diagnosis:	Hypernatremia  Secondary Diagnosis:	Severe sepsis  Secondary Diagnosis:	ALLYSON (acute kidney injury)

## 2020-05-07 NOTE — ED PROCEDURE NOTE - ATTENDING CONTRIBUTION TO CARE
Dr. Mcmanus: I performed a face to face bedside interview with patient regarding history of present illness, review of symptoms and past medical history. I completed an independent physical exam.  I have discussed patient's plan of care with PA.   I agree with note as stated above, having amended the EMR as needed to reflect my findings.   This includes HISTORY OF PRESENT ILLNESS, HIV, PAST MEDICAL/SURGICAL/FAMILY/SOCIAL HISTORY, ALLERGIES AND HOME MEDICATIONS, REVIEW OF SYSTEMS, PHYSICAL EXAM, and any PROGRESS NOTES during the time I functioned as the attending physician for this patient.

## 2020-05-07 NOTE — H&P ADULT - NSHPPHYSICALEXAM_GEN_ALL_CORE
Vital Signs Last 24 Hrs  T(C): 38.3 (07 May 2020 14:09), Max: 38.3 (07 May 2020 14:09)  T(F): 101 (07 May 2020 14:09), Max: 101 (07 May 2020 14:09)  HR: 111 (07 May 2020 14:20) (101 - 118)  BP: 166/100 (07 May 2020 14:20) (91/68 - 166/100)  BP(mean): 76 (07 May 2020 14:00) (73 - 93)  RR: 27 (07 May 2020 14:20) (19 - 44)  SpO2: 98% (07 May 2020 14:22) (79% - 98%)      Physical Examination:  GENERAL:              cachetic, obtunded    HEENT:                    Pupils equal, reactive to light.  Symmetric. No JVD, Moist MM  PULM:                     ETT to vent, bilateral air entry  CVS:                         tachycardic, no M/G/R  ABD:                        Soft, nondistended, nontender, normoactive bowel sounds,   EXT:                         No edema, nontender, No Cyanosis or Clubbing   Vascular:                Cool Extremities, Normal Capillary refill, Normal Distal Pulses  SKIN:                       mottled, scattered areas of ecchymosis at different stages of resolution  NEURO:                  obtunded  PSYC:                      Calm Vital Signs Last 24 Hrs  T(C): 38.3 (07 May 2020 14:09), Max: 38.3 (07 May 2020 14:09)  T(F): 101 (07 May 2020 14:09), Max: 101 (07 May 2020 14:09)  HR: 111 (07 May 2020 14:20) (101 - 118)  BP: 166/100 (07 May 2020 14:20) (91/68 - 166/100)  BP(mean): 76 (07 May 2020 14:00) (73 - 93)  RR: 27 (07 May 2020 14:20) (19 - 44)  SpO2: 98% (07 May 2020 14:22) (79% - 98%)      Physical Examination:  GENERAL:              cachetic, obtunded    HEENT:                   No JVD, dry MM  PULM:                     ETT to vent, bilateral air entry  CVS:                         tachycardic, no M/G/R  ABD:                        Soft, nondistended, nontender, normoactive bowel sounds,   EXT:                         No edema, nontender, No Cyanosis or Clubbing   Vascular:                Cool Extremities, Normal Capillary refill, Normal Distal Pulses  SKIN:                       mottled, scattered areas of ecchymosis at different stages of resolution  NEURO:                  obtunded  PSYC:                      Calm

## 2020-05-07 NOTE — ED PROVIDER NOTE - CRITICAL CARE PROVIDED
direct patient care (not related to procedure)/telephone consultation with the patient's family/conducted a detailed discussion of DNR status/documentation/additional history taking/interpretation of diagnostic studies/consultation with other physicians

## 2020-05-07 NOTE — H&P ADULT - ASSESSMENT
ASSESSMENT:  1. Hypoxic respiratory failure requiring intubation with Right sided pneumonia unclear if related to COVID 19  2. Acute renal failure  3. Hypernatremia  4. ?Sepsis - fever 101F, Leukocystosis 21,000 w/ Lactate and Procal pending  5. Metabolic encephalopathy  6. PMHx of HTN, Bipolar disorder, Glaucoma, TBI, NPH with  shunt    PLAN:  - Admit to ICU  - ARDSNet/Lung protective vent strategy. Maintain SpO2 > 90%. Titrate/wean as tolerated  - Propofol for sedation  - NGT placement for meds and free water flushes  - NPO   - Sliding scale insulin and accuchecks  - Broad spectrum abx   - Obtain sputum, urine, blood cultures, and COVID 19 PCR  - Ying placement, strict I&Os, renal dose meds, avoid nephrotoxins  - Continue home meds  - Obtain CT head  - Trend labs including COVID biomarkers.  - PPX: Pepcid and Heparin  - GOC: Full CODE ASSESSMENT:  1. Hypoxic respiratory failure requiring intubation with Right sided pneumonia unclear if related to COVID 19  2. Acute renal failure  3. Hypernatremia  4. Septic shock - fever 101F, Leukocystosis 21,000, Lactate 8.3 and Procal 1.24  5. Metabolic encephalopathy  6. PMHx of HTN, Bipolar disorder, Glaucoma, TBI, NPH with  shunt    PLAN:  - Admit to ICU  - ARDSNet/Lung protective vent strategy. Maintain SpO2 > 90%. Titrate/wean as tolerated  - Propofol for sedation  - NGT placement for meds and free water flushes  - 0.45% NS @ 75cc/hr   - Q6H BMP  - NPO   - Sliding scale insulin and accuchecks  - Broad spectrum abx   - Obtain sputum, urine, blood cultures, and COVID 19 PCR  - Ying placement, strict I&Os, renal dose meds, avoid nephrotoxins  - Continue home meds  - Obtain CT head  - PPX: Pepcid and Heparin  - GOC: Full CODE ASSESSMENT:  1. Hypoxic respiratory failure requiring intubation with Right sided pneumonia unclear if related to COVID 19  2. Septic shock - fever 101F, Leukocystosis 21,000, Lactate 8.3 and Procal 1.24 due to above   3. Acute renal failure with Hypernatremia  4. 5. Metabolic encephalopathy  5. PMHx of HTN, Bipolar disorder, Glaucoma, TBI, NPH with  shunt    PLAN:  - Admit to ICU  - ARDSNet/Lung protective vent strategy. Maintain SpO2 > 90%. Titrate/wean as tolerated  - Propofol for sedation  - NGT placement for meds and free water flushes  - 0.45% NS @ 100 cc/hr   - Q6H BMP  - NPO , NGT feeding in am to start  - Sliding scale insulin and accuchecks  - Broad spectrum abx ,   - Obtain sputum, urine, blood cultures, and COVID 19 PCR  - Ying placement, strict I&Os, renal dose meds, avoid nephrotoxins  - Continue home meds  - Obtain CT head - pre andre review neg  - PPX: Pepcid and Heparin  - GOC: Full CODE

## 2020-05-07 NOTE — PROGRESS NOTE ADULT - SUBJECTIVE AND OBJECTIVE BOX
Patient is a 69y old  Male who presents with a chief complaint of hypoxic respiratory failure (07 May 2020 14:25)      BRIEF HOSPITAL COURSE: 70 yo M with PMHx of HTN, TBI, NPH with  shunt presented from NH for eval of hypoxia. Of note patient known to be COVID 19 positive 3 weeks ago. Per facility recent testing returned negative for COVID 19 infection. Patient today developed SOB prompting transfer to ED for eval. Upon arrival patient found to be hypoxic on NRB. Patient AOx1 and unable to provide history. Patient does have established HCP who was consult and patient deemed full CODE. Secondary to poor baseline mental status, increased work of breathing and hypoxia patient intubated emergent in the ED. Treated for severe dehydration and septic shock.     Events last 24 hours: Patient seen and examined at the bedside. Appears ill, cachetic, malnourished, on vent. Not making urine, appears dry, BP low. Sedated on propofol. Later in the night acutely dropped his blood pressure, 2L IVF given with minimal response in BP, started on levophed and vasopressin added to maintain MAP > 65.     PAST MEDICAL & SURGICAL HISTORY:  Bipolar disorder  UTI (urinary tract infection)  Dysphonia  Metabolic encephalopathy  COVID-19  Altered mental status  Traumatic brain injury, without loss of consciousness, initial encounter  Hypertension  Personal history of spine surgery      Review of Systems:  unable to obtain due to clinical condition       Medications:  cefepime   IVPB 1000 milliGRAM(s) IV Intermittent daily  cefepime   IVPB      norepinephrine Infusion 0.05 MICROgram(s)/kG/Min IV Continuous <Continuous>  acetaminophen   Tablet .. 975 milliGRAM(s) Oral every 6 hours PRN  ALPRAZolam 0.125 milliGRAM(s) Oral three times a day  diVALproex Sprinkle 750 milliGRAM(s) Oral every 12 hours  lamoTRIgine 100 milliGRAM(s) Oral two times a day  propofol Infusion 10 MICROgram(s)/kG/Min IV Continuous <Continuous>  heparin   Injectable 5500 Unit(s) IV Push every 6 hours PRN  heparin   Injectable 2500 Unit(s) IV Push every 6 hours PRN  heparin  Infusion.  Unit(s)/Hr IV Continuous <Continuous>  famotidine    Tablet 20 milliGRAM(s) Oral daily  dextrose 40% Gel 15 Gram(s) Oral once PRN  dextrose 50% Injectable 12.5 Gram(s) IV Push once  dextrose 50% Injectable 25 Gram(s) IV Push once  dextrose 50% Injectable 25 Gram(s) IV Push once  glucagon  Injectable 1 milliGRAM(s) IntraMuscular once PRN  insulin lispro (HumaLOG) corrective regimen sliding scale   SubCutaneous three times a day before meals  vasopressin Infusion 0.04 Unit(s)/Min IV Continuous <Continuous>  dextrose 5%. 1000 milliLiter(s) IV Continuous <Continuous>  dextrose 5%. 1000 milliLiter(s) IV Continuous <Continuous>  chlorhexidine 0.12% Liquid 15 milliLiter(s) Oral Mucosa every 12 hours  chlorhexidine 4% Liquid 1 Application(s) Topical <User Schedule>  latanoprost 0.005% Ophthalmic Solution 1 Drop(s) Both EYES at bedtime    Mode: AC/ CMV (Assist Control/ Continuous Mandatory Ventilation)  RR (machine): 20  TV (machine): 500  FiO2: 60  PEEP: 5  ITime: 1  MAP: 10  PIP: 21    ICU Vital Signs Last 24 Hrs  T(C): 37.4 (07 May 2020 22:00), Max: 38.3 (07 May 2020 14:09)  T(F): 99.4 (07 May 2020 22:00), Max: 101 (07 May 2020 14:09)  HR: 99 (08 May 2020 01:00) (84 - 128)  BP: 101/71 (08 May 2020 01:00) (65/46 - 171/89)  BP(mean): 79 (08 May 2020 01:00) (51 - 111)  ABP: 120/117 (08 May 2020 01:00) (62/52 - 168/139)  ABP(mean): 118 (08 May 2020 01:00) (47 - 154)  RR: 22 (08 May 2020 01:00) (18 - 44)  SpO2: 100% (08 May 2020 01:00) (79% - 100%)      ABG - ( 07 May 2020 23:25 )  pH, Arterial: 7.21  pH, Blood: x     /  pCO2: 68    /  pO2: 103   / HCO3: x     / Base Excess: x     /  SaO2: 96          I&O's Detail    07 May 2020 07:01  -  08 May 2020 01:09  --------------------------------------------------------  IN:    dextrose 5%.: 750 mL    Enteral Tube Flush: 300 mL    Lactated Ringers IV Bolus: 2000 mL    propofol Infusion: 217.3 mL    sodium chloride 0.45%: 75 mL    Sodium Chloride 0.9% IV Bolus: 1000 mL    Solution: 50 mL    vasopressin Infusion: 2.4 mL  Total IN: 4394.7 mL    OUT:    Voided: 100 mL  Total OUT: 100 mL    Total NET: 4294.7 mL        LABS:                        16.8   21.84 )-----------( 275      ( 07 May 2020 13:33 )             55.2     05-07    168<HH>  |  127<H>  |  92<H>  ----------------------------<  153<H>  5.4<H>   |  33<H>  |  2.33<H>    Ca    8.9      07 May 2020 19:00    TPro  7.2  /  Alb  2.5<L>  /  TBili  0.5  /  DBili  x   /  AST  25  /  ALT  20  /  AlkPhos  77  05-07      CARDIAC MARKERS ( 07 May 2020 13:33 )  <.017 ng/mL / x     / x     / x     / x          CAPILLARY BLOOD GLUCOSE      POCT Blood Glucose.: 142 mg/dL (07 May 2020 21:10)    PT/INR - ( 07 May 2020 13:33 )   PT: 14.8 sec;   INR: 1.31 ratio         PTT - ( 07 May 2020 13:33 )  PTT:27.7 sec  Urinalysis Basic - ( 07 May 2020 14:33 )    Color: Yellow / Appearance: Clear / S.020 / pH: x  Gluc: x / Ketone: Trace  / Bili: Negative / Urobili: Negative   Blood: x / Protein: 30 mg/dL / Nitrite: Negative   Leuk Esterase: Small / RBC: 5-10 /HPF / WBC 3-5 /HPF   Sq Epi: x / Non Sq Epi: Neg.-Few / Bacteria: Few /HPF      CULTURES:      Physical Examination:    General: elderly, cachetic, dry, ill appearing     HEENT: Pupils equal, reactive to light.  Symmetric.    PULM: mild rales b/l     CVS: irregular rhythm, regular rate, no MRG    ABD: Soft, nondistended, nontender, normoactive bowel sounds, no masses    EXT: No edema, nontender    SKIN: Warm and well perfused, no rashes noted.    NEURO: sedated       RADIOLOGY: EXAM:  XR CHEST PORTABLE URGENT 1V      PROCEDURE DATE:  2020        INTERPRETATION:    Examination: XR CHEST URGENT    History: ADMDIAG1: J96.01 ACUTE RESPIRATORY FAILURE WITH HYPOXIA/, ngt    Comparison: Earlier the same afternoon    Findings:  Frontal view of the lower chest shows nasogastric tube coiling in the upper stomach. No focal infiltrate or pneumothorax is identified at the visualized levels.    Impression:  1.  NG tube to stomach.      DISCRETE X-RAY DATA:  Percent of LEFT lung opacification: Clear  Percent of RIGHT lung opacification: Clear  Change in lung opacification from most recent x-ray (<=3 days): Stable  Change from prior dated 3 or more days (same admission): No Prior          HARITHA GAONA M.D., ATTENDING RADIOLOGIST  This document has been electronically signed. May  7 2020  7:36PM          CRITICAL CARE TIME SPENT: 70minutes assessing presenting problems of acute illness, which pose high probability of life threatening deterioration or end organ damage/dysfunction, as well as medical decision making including initiating plan of care, reviewing data, reviewing radiologic exams, discussing with multidisciplinary team,  discussing goals of care with patient/family, and writing this note.  Non-inclusive of procedures performed,   Evaluating/treating patient, reviewing data/labs/imaging, discussing case with multidisciplinary team, discussing plan/goals of care with patient/family. Non-inclusive of procedure time.

## 2020-05-07 NOTE — ED PROVIDER NOTE - CLINICAL SUMMARY MEDICAL DECISION MAKING FREE TEXT BOX
Dr. Mcmanus: 69M A&Ox1 at baseline, h/o  shunt placement for TBI, dementia, COVID+ 3 weeks ago, BIBEMS from NH for respiratory failure, pulse ox 80% on 100% NRB. Pt also found to be tachy. On exam pt in severe respiratory distress, tachy/coarse BS bilaterally, not answering any questions, not responsive to tactile stimuli, abdo soft/nt/nd, no erythema or warmth or rashes on body, +contractures. +Extensive GOC with family and consultants, concern for severe sepsis and COVID hypoxia, pt intubated for hypoxia and airway protection.

## 2020-05-07 NOTE — ED ADULT NURSE NOTE - NS ED NURSE TRANSPORT WITH
ACLS Rescue Kit/pulse ox/drains/Cardiac Monitor/Defib/ACLS/Rescue Kit/O2/BVM/oxygen/ventilator/IV pump

## 2020-05-07 NOTE — PROVIDER CONTACT NOTE (EICU) - ACTION/TREATMENT ORDERED:
Contacted by bedside team requesting levophed for hypotension and CXR to confirm central line placement, order placed as requested

## 2020-05-07 NOTE — ED ADULT NURSE NOTE - OBJECTIVE STATEMENT
[facility] status and low O2 sat. Patient was COVID+ x5 weeks ago. Patient has been refusing to eat the last few days and decreased responsiveness/activity. Placed on 100% NRB en route and O2 sat still remained 80s. Extensive medical history including TBI. Pt transferred from [facility] with altered mental status, SOB and hypoxia from facility. Patient was COVID+ x3 weeks ago. Patient has been refusing to eat the last few days and decreased responsiveness/activity. Placed on 100% NRB en route and O2 sat still remained 82%. PMHx of TBI,  shunt placed, dementia, AAOx1 at baseline p/w SOB and hypoxia from facility.

## 2020-05-07 NOTE — ED PROVIDER NOTE - ATTENDING CONTRIBUTION TO CARE
Dr. Mcmanus: I performed a face to face bedside interview with patient regarding history of present illness, review of symptoms and past medical history. I completed an independent physical exam.  I have discussed patient's plan of care with PA.   I agree with note as stated above, having amended the EMR as needed to reflect my findings.   This includes HISTORY OF PRESENT ILLNESS, HIV, PAST MEDICAL/SURGICAL/FAMILY/SOCIAL HISTORY, ALLERGIES AND HOME MEDICATIONS, REVIEW OF SYSTEMS, PHYSICAL EXAM, and any PROGRESS NOTES during the time I functioned as the attending physician for this patient.    see mdm

## 2020-05-07 NOTE — PROGRESS NOTE ADULT - ASSESSMENT
70 yo M with PMHx of HTN, TBI, NPH with  shunt presented from NH for eval of hypoxia. Of note patient known to be COVID 19 positive 3 weeks ago. Per facility recent testing returned negative for COVID 19 infection. Patient today developed SOB prompting transfer to ED for eval. Upon arrival patient found to be hypoxic on NRB. Patient AOx1 and unable to provide history. Patient does have established HCP who was consult and patient deemed full CODE. Secondary to poor baseline mental status, increased work of breathing and hypoxia patient intubated emergent in the ED. Treated for severe dehydration and septic shock.       Problem List:  1) Hypercapnic Respiratory failure  2) Distributive shock vs. Hypovolemic shock   3) Lobar PNA  4) ?UTI   5) Hypernatremia  6) Dehydration  7) ARF       NEURO: Sedate with propofol, keep lowest effective dose as BP is labile. Keep him on his home dose Lamictal and depakote     CV: now in shock state, s/p 2L IVF now on levophed and vasopressin to maintain MAP >65. Unable to obtain cardiac views on POCUS. Suspect distributive shock from sepsis and component of hypovolemia. Will add D5W @ 125cc/hr as well. Check echo     PULM: Repeat ABG with respiratory acidosis, increase TV to 550ml, increase RR 26, Pplat 19. Lower FiO2 to 50%. PEEP at 5    GI: Keep NPO for now     RENAL: ALLYSON, not making urine, given additional 2L IVF without increase in UO, now on pressors shooting for a higher MAP goal. Holding off on lasix given severe dehydration. Treat Hyperkalemia with Dextrose and IV insulin     HEME: SCDs, change eliquis to heparin gtt given renal failure    ID: Procal elevated, WBC elevated, CRP elevated, cultures sent, add cefepime, got 1g vanco in ER dose by level. Febrile to 101, first COVID neg, send another test. He may have Right sided PNA, UA not impressive. He may still have COIVD 19 despite low FiO2 requirements and mild CXR. need to follow up on blood cultures.     ENDO: insulin sliding scale q6     DISPO: critically ill to remain in ICU, attempt to reach out to HCP Anel however left a message for call back. 68 yo M with PMHx of HTN, TBI, NPH with  shunt presented from NH for eval of hypoxia. Of note patient known to be COVID 19 positive 3 weeks ago. Per facility recent testing returned negative for COVID 19 infection. Patient today developed SOB prompting transfer to ED for eval. Upon arrival patient found to be hypoxic on NRB. Patient AOx1 and unable to provide history. Patient does have established HCP who was consult and patient deemed full CODE. Secondary to poor baseline mental status, increased work of breathing and hypoxia patient intubated emergent in the ED. Treated for severe dehydration and septic shock.       Problem List:  1) Hypercapnic Respiratory failure  2) Distributive shock vs. Hypovolemic shock   3) Lobar PNA  4) ?UTI   5) Hypernatremia  6) Dehydration  7) ARF       NEURO: Sedate with propofol, keep lowest effective dose as BP is labile. Keep him on his home dose Lamictal and depakote     CV: now in shock state, s/p 2L IVF now on levophed and vasopressin to maintain MAP >65. Unable to obtain cardiac views on POCUS. Suspect distributive shock from sepsis and component of hypovolemia. Will add D5W @ 125cc/hr as well. Check echo     PULM: Repeat ABG with respiratory acidosis, increase TV to 550ml, increase RR 26, Pplat 19. Lower FiO2 to 50%. PEEP at 5    GI: Keep NPO for now     RENAL: ALLYSON, not making urine, given additional 2L IVF without increase in UO, now on pressors shooting for a higher MAP goal. Holding off on lasix given severe dehydration. Treat Hyperkalemia with Dextrose and IV insulin     HEME: SCDs, change eliquis to heparin gtt given renal failure    ID: Procal elevated, WBC elevated, CRP elevated, cultures sent, add cefepime, got 1g vanco in ER dose by level. Febrile to 101, first COVID neg, send another test. He may have Right sided PNA, UA not impressive. He may still have COIVD 19 despite low FiO2 requirements and mild CXR. need to follow up on blood cultures.     ENDO: insulin sliding scale q6. Add stress dose hydrocortisone for refractory shock     DISPO: critically ill to remain in ICU, attempt to reach out to HCP Anel however left a message for call back.

## 2020-05-07 NOTE — AIRWAY PLACEMENT NOTE ADULT - AIRWAY COMMENTS:
pt intubated in ED with glydoscope by PA and ED on STB with glydoscope 7.5 ETT 25 at lip vent settings per ER MD .  Pt was intubated around 2pm

## 2020-05-07 NOTE — H&P ADULT - NSICDXPASTMEDICALHX_GEN_ALL_CORE_FT
PAST MEDICAL HISTORY:  Altered mental status     Bipolar disorder     COVID-19     Dysphonia     Hypertension     Metabolic encephalopathy     Traumatic brain injury, without loss of consciousness, initial encounter     UTI (urinary tract infection)

## 2020-05-07 NOTE — ED ADULT NURSE NOTE - PMH
Altered mental status    Bipolar disorder    COVID-19    Dysphonia    Hypertension    Metabolic encephalopathy    Traumatic brain injury, without loss of consciousness, initial encounter    UTI (urinary tract infection)

## 2020-05-07 NOTE — ED PROVIDER NOTE - OBJECTIVE STATEMENT
70 yo M PMHx of TBI,  shunt placed, dementia, AAOx1 at baseline p/w SOB and hypoxia from facility. pt was +Covid 3-5 weeks ago and has since been moved to a noncovid room at his facility. came into Ed sating 82% on 100% NRB  pt presents full code

## 2020-05-08 LAB
A1C WITH ESTIMATED AVERAGE GLUCOSE RESULT: 5.5 % — SIGNIFICANT CHANGE UP (ref 4–5.6)
ANION GAP SERPL CALC-SCNC: 6 MMOL/L — SIGNIFICANT CHANGE UP (ref 5–17)
ANION GAP SERPL CALC-SCNC: 6 MMOL/L — SIGNIFICANT CHANGE UP (ref 5–17)
ANION GAP SERPL CALC-SCNC: 8 MMOL/L — SIGNIFICANT CHANGE UP (ref 5–17)
ANION GAP SERPL CALC-SCNC: 9 MMOL/L — SIGNIFICANT CHANGE UP (ref 5–17)
APTT BLD: 70.1 SEC — HIGH (ref 27.5–36.3)
APTT BLD: 74.6 SEC — HIGH (ref 27.5–36.3)
BUN SERPL-MCNC: 78 MG/DL — HIGH (ref 7–23)
BUN SERPL-MCNC: 78 MG/DL — HIGH (ref 7–23)
BUN SERPL-MCNC: 82 MG/DL — HIGH (ref 7–23)
BUN SERPL-MCNC: 90 MG/DL — HIGH (ref 7–23)
CALCIUM SERPL-MCNC: 7.6 MG/DL — LOW (ref 8.4–10.5)
CALCIUM SERPL-MCNC: 7.7 MG/DL — LOW (ref 8.4–10.5)
CALCIUM SERPL-MCNC: 7.7 MG/DL — LOW (ref 8.4–10.5)
CALCIUM SERPL-MCNC: 7.9 MG/DL — LOW (ref 8.4–10.5)
CHLORIDE SERPL-SCNC: 118 MMOL/L — HIGH (ref 96–108)
CHLORIDE SERPL-SCNC: 119 MMOL/L — HIGH (ref 96–108)
CHLORIDE SERPL-SCNC: 120 MMOL/L — HIGH (ref 96–108)
CHLORIDE SERPL-SCNC: 126 MMOL/L — HIGH (ref 96–108)
CO2 SERPL-SCNC: 24 MMOL/L — SIGNIFICANT CHANGE UP (ref 22–31)
CO2 SERPL-SCNC: 27 MMOL/L — SIGNIFICANT CHANGE UP (ref 22–31)
CO2 SERPL-SCNC: 28 MMOL/L — SIGNIFICANT CHANGE UP (ref 22–31)
CO2 SERPL-SCNC: 28 MMOL/L — SIGNIFICANT CHANGE UP (ref 22–31)
CREAT SERPL-MCNC: 1.17 MG/DL — SIGNIFICANT CHANGE UP (ref 0.5–1.3)
CREAT SERPL-MCNC: 1.34 MG/DL — HIGH (ref 0.5–1.3)
CREAT SERPL-MCNC: 1.49 MG/DL — HIGH (ref 0.5–1.3)
CREAT SERPL-MCNC: 1.94 MG/DL — HIGH (ref 0.5–1.3)
CULTURE RESULTS: NO GROWTH — SIGNIFICANT CHANGE UP
ESTIMATED AVERAGE GLUCOSE: 111 MG/DL — SIGNIFICANT CHANGE UP (ref 68–114)
GLUCOSE BLDC GLUCOMTR-MCNC: 113 MG/DL — HIGH (ref 70–99)
GLUCOSE BLDC GLUCOMTR-MCNC: 138 MG/DL — HIGH (ref 70–99)
GLUCOSE BLDC GLUCOMTR-MCNC: 158 MG/DL — HIGH (ref 70–99)
GLUCOSE BLDC GLUCOMTR-MCNC: 200 MG/DL — HIGH (ref 70–99)
GLUCOSE BLDC GLUCOMTR-MCNC: 208 MG/DL — HIGH (ref 70–99)
GLUCOSE SERPL-MCNC: 152 MG/DL — HIGH (ref 70–99)
GLUCOSE SERPL-MCNC: 169 MG/DL — HIGH (ref 70–99)
GLUCOSE SERPL-MCNC: 220 MG/DL — HIGH (ref 70–99)
GLUCOSE SERPL-MCNC: 271 MG/DL — HIGH (ref 70–99)
GRAM STN FLD: SIGNIFICANT CHANGE UP
HCT VFR BLD CALC: 42.2 % — SIGNIFICANT CHANGE UP (ref 39–50)
HCT VFR BLD CALC: 42.4 % — SIGNIFICANT CHANGE UP (ref 39–50)
HGB BLD-MCNC: 12.7 G/DL — LOW (ref 13–17)
HGB BLD-MCNC: 13.1 G/DL — SIGNIFICANT CHANGE UP (ref 13–17)
LACTATE SERPL-SCNC: 4.3 MMOL/L — CRITICAL HIGH (ref 0.7–2)
MAGNESIUM SERPL-MCNC: 2.8 MG/DL — HIGH (ref 1.6–2.6)
MCHC RBC-ENTMCNC: 30 GM/DL — LOW (ref 32–36)
MCHC RBC-ENTMCNC: 31 GM/DL — LOW (ref 32–36)
MCHC RBC-ENTMCNC: 31.6 PG — SIGNIFICANT CHANGE UP (ref 27–34)
MCHC RBC-ENTMCNC: 31.7 PG — SIGNIFICANT CHANGE UP (ref 27–34)
MCV RBC AUTO: 102.2 FL — HIGH (ref 80–100)
MCV RBC AUTO: 105.5 FL — HIGH (ref 80–100)
NRBC # BLD: 0 /100 WBCS — SIGNIFICANT CHANGE UP (ref 0–0)
NRBC # BLD: 0 /100 WBCS — SIGNIFICANT CHANGE UP (ref 0–0)
PHOSPHATE SERPL-MCNC: 3.5 MG/DL — SIGNIFICANT CHANGE UP (ref 2.5–4.5)
PLATELET # BLD AUTO: 182 K/UL — SIGNIFICANT CHANGE UP (ref 150–400)
PLATELET # BLD AUTO: 186 K/UL — SIGNIFICANT CHANGE UP (ref 150–400)
POTASSIUM SERPL-MCNC: 3.9 MMOL/L — SIGNIFICANT CHANGE UP (ref 3.5–5.3)
POTASSIUM SERPL-MCNC: 4.2 MMOL/L — SIGNIFICANT CHANGE UP (ref 3.5–5.3)
POTASSIUM SERPL-SCNC: 3.9 MMOL/L — SIGNIFICANT CHANGE UP (ref 3.5–5.3)
POTASSIUM SERPL-SCNC: 4.2 MMOL/L — SIGNIFICANT CHANGE UP (ref 3.5–5.3)
RBC # BLD: 4.02 M/UL — LOW (ref 4.2–5.8)
RBC # BLD: 4.13 M/UL — LOW (ref 4.2–5.8)
RBC # FLD: 15.8 % — HIGH (ref 10.3–14.5)
RBC # FLD: 16.2 % — HIGH (ref 10.3–14.5)
SODIUM SERPL-SCNC: 152 MMOL/L — HIGH (ref 135–145)
SODIUM SERPL-SCNC: 154 MMOL/L — HIGH (ref 135–145)
SODIUM SERPL-SCNC: 154 MMOL/L — HIGH (ref 135–145)
SODIUM SERPL-SCNC: 159 MMOL/L — HIGH (ref 135–145)
SPECIMEN SOURCE: SIGNIFICANT CHANGE UP
SPECIMEN SOURCE: SIGNIFICANT CHANGE UP
WBC # BLD: 18.06 K/UL — HIGH (ref 3.8–10.5)
WBC # BLD: 18.24 K/UL — HIGH (ref 3.8–10.5)
WBC # FLD AUTO: 18.06 K/UL — HIGH (ref 3.8–10.5)
WBC # FLD AUTO: 18.24 K/UL — HIGH (ref 3.8–10.5)

## 2020-05-08 PROCEDURE — 71045 X-RAY EXAM CHEST 1 VIEW: CPT | Mod: 26,CS,77

## 2020-05-08 PROCEDURE — 93970 EXTREMITY STUDY: CPT | Mod: 26

## 2020-05-08 PROCEDURE — 36620 INSERTION CATHETER ARTERY: CPT

## 2020-05-08 PROCEDURE — 99497 ADVNCD CARE PLAN 30 MIN: CPT | Mod: 25

## 2020-05-08 PROCEDURE — 99223 1ST HOSP IP/OBS HIGH 75: CPT

## 2020-05-08 PROCEDURE — 71045 X-RAY EXAM CHEST 1 VIEW: CPT | Mod: 26

## 2020-05-08 PROCEDURE — 93306 TTE W/DOPPLER COMPLETE: CPT | Mod: 26

## 2020-05-08 RX ORDER — DEXTROSE 50 % IN WATER 50 %
50 SYRINGE (ML) INTRAVENOUS ONCE
Refills: 0 | Status: COMPLETED | OUTPATIENT
Start: 2020-05-08 | End: 2020-05-08

## 2020-05-08 RX ORDER — HEPARIN SODIUM 5000 [USP'U]/ML
5500 INJECTION INTRAVENOUS; SUBCUTANEOUS EVERY 6 HOURS
Refills: 0 | Status: DISCONTINUED | OUTPATIENT
Start: 2020-05-08 | End: 2020-05-09

## 2020-05-08 RX ORDER — INSULIN LISPRO 100/ML
VIAL (ML) SUBCUTANEOUS EVERY 6 HOURS
Refills: 0 | Status: DISCONTINUED | OUTPATIENT
Start: 2020-05-08 | End: 2020-05-15

## 2020-05-08 RX ORDER — HYDROCORTISONE 20 MG
50 TABLET ORAL EVERY 6 HOURS
Refills: 0 | Status: DISCONTINUED | OUTPATIENT
Start: 2020-05-08 | End: 2020-05-10

## 2020-05-08 RX ORDER — HEPARIN SODIUM 5000 [USP'U]/ML
INJECTION INTRAVENOUS; SUBCUTANEOUS
Qty: 25000 | Refills: 0 | Status: DISCONTINUED | OUTPATIENT
Start: 2020-05-08 | End: 2020-05-08

## 2020-05-08 RX ORDER — HEPARIN SODIUM 5000 [USP'U]/ML
2500 INJECTION INTRAVENOUS; SUBCUTANEOUS EVERY 6 HOURS
Refills: 0 | Status: DISCONTINUED | OUTPATIENT
Start: 2020-05-08 | End: 2020-05-09

## 2020-05-08 RX ORDER — HEPARIN SODIUM 5000 [USP'U]/ML
INJECTION INTRAVENOUS; SUBCUTANEOUS
Qty: 25000 | Refills: 0 | Status: DISCONTINUED | OUTPATIENT
Start: 2020-05-08 | End: 2020-05-09

## 2020-05-08 RX ORDER — VASOPRESSIN 20 [USP'U]/ML
0.04 INJECTION INTRAVENOUS
Qty: 50 | Refills: 0 | Status: DISCONTINUED | OUTPATIENT
Start: 2020-05-08 | End: 2020-05-09

## 2020-05-08 RX ORDER — INSULIN HUMAN 100 [IU]/ML
10 INJECTION, SOLUTION SUBCUTANEOUS ONCE
Refills: 0 | Status: COMPLETED | OUTPATIENT
Start: 2020-05-08 | End: 2020-05-08

## 2020-05-08 RX ADMIN — Medication 6.38 MICROGRAM(S)/KG/MIN: at 04:52

## 2020-05-08 RX ADMIN — LATANOPROST 1 DROP(S): 0.05 SOLUTION/ DROPS OPHTHALMIC; TOPICAL at 21:21

## 2020-05-08 RX ADMIN — Medication 4: at 05:22

## 2020-05-08 RX ADMIN — Medication 2: at 12:13

## 2020-05-08 RX ADMIN — LAMOTRIGINE 100 MILLIGRAM(S): 25 TABLET, ORALLY DISINTEGRATING ORAL at 04:51

## 2020-05-08 RX ADMIN — LAMOTRIGINE 100 MILLIGRAM(S): 25 TABLET, ORALLY DISINTEGRATING ORAL at 17:03

## 2020-05-08 RX ADMIN — Medication 50 MILLIGRAM(S): at 23:49

## 2020-05-08 RX ADMIN — DIVALPROEX SODIUM 750 MILLIGRAM(S): 500 TABLET, DELAYED RELEASE ORAL at 17:03

## 2020-05-08 RX ADMIN — Medication 0.12 MILLIGRAM(S): at 04:49

## 2020-05-08 RX ADMIN — Medication 50 MILLIGRAM(S): at 17:03

## 2020-05-08 RX ADMIN — INSULIN HUMAN 10 UNIT(S): 100 INJECTION, SOLUTION SUBCUTANEOUS at 02:04

## 2020-05-08 RX ADMIN — CEFEPIME 100 MILLIGRAM(S): 1 INJECTION, POWDER, FOR SOLUTION INTRAMUSCULAR; INTRAVENOUS at 11:13

## 2020-05-08 RX ADMIN — Medication 0.12 MILLIGRAM(S): at 14:41

## 2020-05-08 RX ADMIN — FAMOTIDINE 20 MILLIGRAM(S): 10 INJECTION INTRAVENOUS at 14:41

## 2020-05-08 RX ADMIN — HEPARIN SODIUM 1200 UNIT(S)/HR: 5000 INJECTION INTRAVENOUS; SUBCUTANEOUS at 09:59

## 2020-05-08 RX ADMIN — DIVALPROEX SODIUM 750 MILLIGRAM(S): 500 TABLET, DELAYED RELEASE ORAL at 04:50

## 2020-05-08 RX ADMIN — Medication 6.38 MICROGRAM(S)/KG/MIN: at 23:59

## 2020-05-08 RX ADMIN — PROPOFOL 4.08 MICROGRAM(S)/KG/MIN: 10 INJECTION, EMULSION INTRAVENOUS at 04:52

## 2020-05-08 RX ADMIN — Medication 50 MILLILITER(S): at 02:04

## 2020-05-08 RX ADMIN — VASOPRESSIN 2.4 UNIT(S)/MIN: 20 INJECTION INTRAVENOUS at 01:00

## 2020-05-08 RX ADMIN — CHLORHEXIDINE GLUCONATE 15 MILLILITER(S): 213 SOLUTION TOPICAL at 02:08

## 2020-05-08 RX ADMIN — PROPOFOL 4.08 MICROGRAM(S)/KG/MIN: 10 INJECTION, EMULSION INTRAVENOUS at 02:01

## 2020-05-08 RX ADMIN — Medication 50 MILLIGRAM(S): at 09:53

## 2020-05-08 RX ADMIN — Medication 6.38 MICROGRAM(S)/KG/MIN: at 01:00

## 2020-05-08 RX ADMIN — Medication 50 MILLIGRAM(S): at 02:03

## 2020-05-08 RX ADMIN — SODIUM CHLORIDE 1000 MILLILITER(S): 9 INJECTION, SOLUTION INTRAVENOUS at 00:12

## 2020-05-08 RX ADMIN — HEPARIN SODIUM 1200 UNIT(S)/HR: 5000 INJECTION INTRAVENOUS; SUBCUTANEOUS at 02:07

## 2020-05-08 RX ADMIN — VASOPRESSIN 2.4 UNIT(S)/MIN: 20 INJECTION INTRAVENOUS at 04:25

## 2020-05-08 RX ADMIN — HEPARIN SODIUM 1200 UNIT(S)/HR: 5000 INJECTION INTRAVENOUS; SUBCUTANEOUS at 17:03

## 2020-05-08 RX ADMIN — CHLORHEXIDINE GLUCONATE 15 MILLILITER(S): 213 SOLUTION TOPICAL at 17:03

## 2020-05-08 RX ADMIN — VASOPRESSIN 2.4 UNIT(S)/MIN: 20 INJECTION INTRAVENOUS at 01:12

## 2020-05-08 RX ADMIN — Medication 0.12 MILLIGRAM(S): at 21:21

## 2020-05-08 RX ADMIN — CHLORHEXIDINE GLUCONATE 1 APPLICATION(S): 213 SOLUTION TOPICAL at 00:10

## 2020-05-08 RX ADMIN — SODIUM CHLORIDE 125 MILLILITER(S): 9 INJECTION, SOLUTION INTRAVENOUS at 04:51

## 2020-05-08 NOTE — CONSULT NOTE ADULT - SUBJECTIVE AND OBJECTIVE BOX
HPI:  68 yo M with PMHx of HTN, TBI, NPH with  shunt presented from NH for eval of hypoxia. Of note patient known to be COVID 19 positive 3 weeks ago. Per facility recent testing returned negative for COVID 19 infection. Patient  developed SOB prompting transfer to ED for eval. Upon arrival patient found to be hypoxic on NRB. Patient AOx1 and unable to provide history. Patient does have established legal guardian  who was consult and patient deemed full CODE. Secondary to poor baseline mental status, increased work of breathing and hypoxia patient intubated emergent in the ED.     ED workup significant for WBC 21.84, Na 166, Cr. 2.28, Glu 279, ABG 7.41/34/50 with multiple labs pending. CXR with right sided infiltrates.       PAST MEDICAL & SURGICAL HISTORY:  Bipolar disorder  UTI (urinary tract infection)  Dysphonia  Metabolic encephalopathy  COVID-19  Altered mental status  Traumatic brain injury, without loss of consciousness, initial encounter  Hypertension  Personal history of spine surgery      SOCIAL HISTORY:    Admitted from:  [facility]   Substance abuse history:              Tobacco hx:                  Alcohol hx:              Home Opioid hx:  Adventism:                                    Preferred Language:    Guardian:   Anel Rg   cousin            Phone#:   [phone]    FAMILY HISTORY:  FH: alcoholism    Baseline ADLs (prior to admission):    Allergies    No Known Allergies    Intolerances      Present Symptoms: sedated/intubated   Dyspnea:   Nausea/Vomiting:   Anxiety:  Depressed   Fatigue:  Loss of appetite:   Pain:                                location:          Review of Systems:  Unable to obtain due to poor mentation]    MEDICATIONS  (STANDING):  ALPRAZolam 0.125 milliGRAM(s) Oral three times a day  cefepime   IVPB 1000 milliGRAM(s) IV Intermittent daily  cefepime   IVPB      chlorhexidine 0.12% Liquid 15 milliLiter(s) Oral Mucosa every 12 hours  chlorhexidine 4% Liquid 1 Application(s) Topical <User Schedule>  dextrose 5%. 1000 milliLiter(s) (75 mL/Hr) IV Continuous <Continuous>  dextrose 5%. 1000 milliLiter(s) (50 mL/Hr) IV Continuous <Continuous>  dextrose 50% Injectable 12.5 Gram(s) IV Push once  dextrose 50% Injectable 25 Gram(s) IV Push once  dextrose 50% Injectable 25 Gram(s) IV Push once  diVALproex Sprinkle 750 milliGRAM(s) Oral every 12 hours  famotidine    Tablet 20 milliGRAM(s) Oral daily  heparin  Infusion.  Unit(s)/Hr (12 mL/Hr) IV Continuous <Continuous>  hydrocortisone sodium succinate Injectable 50 milliGRAM(s) IV Push every 6 hours  insulin lispro (HumaLOG) corrective regimen sliding scale   SubCutaneous every 6 hours  lamoTRIgine 100 milliGRAM(s) Oral two times a day  latanoprost 0.005% Ophthalmic Solution 1 Drop(s) Both EYES at bedtime  norepinephrine Infusion 0.05 MICROgram(s)/kG/Min (6.38 mL/Hr) IV Continuous <Continuous>  propofol Infusion 10 MICROgram(s)/kG/Min (4.08 mL/Hr) IV Continuous <Continuous>  vasopressin Infusion 0.04 Unit(s)/Min (2.4 mL/Hr) IV Continuous <Continuous>    MEDICATIONS  (PRN):  acetaminophen   Tablet .. 975 milliGRAM(s) Oral every 6 hours PRN Temp greater or equal to 38C (100.4F)  dextrose 40% Gel 15 Gram(s) Oral once PRN Blood Glucose LESS THAN 70 milliGRAM(s)/deciliter  glucagon  Injectable 1 milliGRAM(s) IntraMuscular once PRN Glucose LESS THAN 70 milligrams/deciliter  heparin   Injectable 5500 Unit(s) IV Push every 6 hours PRN For aPTT less than 40  heparin   Injectable 2500 Unit(s) IV Push every 6 hours PRN For aPTT between 40 - 57      PHYSICAL EXAM:    Vital Signs Last 24 Hrs  T(C): 36.8 (08 May 2020 09:00), Max: 38.3 (07 May 2020 14:09)  T(F): 98.3 (08 May 2020 09:00), Max: 101 (07 May 2020 14:09)  HR: 95 (08 May 2020 12:00) (84 - 128)  BP: 143/85 (08 May 2020 12:00) (65/46 - 171/89)  BP(mean): 97 (08 May 2020 12:00) (51 - 111)  RR: 24 (08 May 2020 12:00) (18 - 44)  SpO2: 100% (08 May 2020 12:00) (79% - 100%)    General:   nonverbal  unarousable intubated in ccu   Karnofsky Performance Score/Palliative Performance Status Version2:   10  %    Oral intake ability: unable/only mouth care   Diet: ng feeds    LABS:                        13.1   18.24 )-----------( 186      ( 08 May 2020 08:30 )             42.2     05-08    154<H>  |  120<H>  |  82<H>  ----------------------------<  220<H>  4.2   |  28  |  1.49<H>    Ca    7.9<L>      08 May 2020 08:30  Phos  3.5     05-08  Mg     2.8     05-08    TPro  7.2  /  Alb  2.5<L>  /  TBili  0.5  /  DBili  x   /  AST  25  /  ALT  20  /  AlkPhos  77  05-07    Urinalysis Basic - ( 07 May 2020 14:33 )    Color: Yellow / Appearance: Clear / S.020 / pH: x  Gluc: x / Ketone: Trace  / Bili: Negative / Urobili: Negative   Blood: x / Protein: 30 mg/dL / Nitrite: Negative   Leuk Esterase: Small / RBC: 5-10 /HPF / WBC 3-5 /HPF   Sq Epi: x / Non Sq Epi: Neg.-Few / Bacteria: Few /HPF        RADIOLOGY & ADDITIONAL STUDIES:< from: Xray Chest 1 View- PORTABLE-Routine (20 @ 10:38) >  EXAM:  XR CHEST PORTABLE ROUTINE 1V      PROCEDURE DATE:  2020        INTERPRETATION:    Single AP view of the chest    Comparison:2020    Clinical Indication:intubated    FINDINGS/  IMPRESSION:  There is an endotracheal tube with the tip seen two vertebral bodies above the xochilt. There is an NG tube seen with the tip in the stomach. There is a left internal jugular central line with the tip in the SVC. There is a right internal jugular central line with the tip in the region of the SVC. Faint patchy opacities with patchy density in the left hilar region. Left costophrenic angle is excluded from the study.    DISCRETE X-RAY DATA:  Percent of LEFT lung opacification: 1-33%  Percent of RIGHT lung opacification: 1-33%  Change in lung opacification from most recent x-ray (<=3 days): Stable  Change from prior dated 3 or more days (same admission): No Prior          ADVANCE DIRECTIVES: [facility]   Advanced Care Planning discussion total time spent:

## 2020-05-08 NOTE — PROGRESS NOTE ADULT - SUBJECTIVE AND OBJECTIVE BOX
HPI:      Hosp day #  Vent day #      Vital signs/reviewed and physical exam performed where pertinent and urgently required.    Lab/radiology studies/ABG/meds reviewed and interpreted into the assesment and treatment plan.    Assessment/Plan/Therapeutic interventions      Neuro: Sedation neuromuscular blockade as needed to facilitate safe ventilation    Cor:  Pressor support as needed to maintain MAP 65.  Avoiding fluid challenges.     Pulm:  ARDS-NET 4-6cc/kg IBW TV as able to maintain plateau pressures <30. Prone ventilation consideration as feasible.  Pa02/Fi02 < 150 on Fi02 >60% and PEEP at least 5.    GI:  PPI  Enteric feeds as tolerated     Renal: Even to negative fluid balance as tolerated by hemodynamics and renal fx.      Heme:  Pharmacologic DVT P in addition to SCD's in low risk,  full AC in high risk  following D- Dimer clinical course and doppler studied where appropriate.     ID: ABX discontinuation based on discussion with ID in conjunction with clinical features, culture data, and judicious procalcitonin monitoring.      Endo Aggressive glycemic control to limit FS glucose to < 180mg/dl.        COVID 19 specific considerations and therapeutic options based on the available and rapidly changing literature    Goals of care considerations:  Ongoing assessment for patient specific treatment options based on progression or decline.  I have involved the family with updates and requests in guidance for medical decision making.      38 minutes of critical care time spent in the management of this critically ill COVID-19 patient/PUI patient with continuous assessments and interventions based on the interpretation of multiple databases. HPI:    70 yo M with PMHx of HTN, TBI, NPH with  shunt presented from NH for eval of hypoxia. Of note patient known to be COVID 19 positive 3 weeks ago. Per facility recent testing returned negative for COVID 19 infection. Patient today developed SOB prompting transfer to ED for eval. Upon arrival patient found to be hypoxic on NRB. Patient AOx1 and unable to provide history. Patient does have established HCP who was consult and patient deemed full CODE. Secondary to poor baseline mental status, increased work of breathing and hypoxia patient intubated emergent in the ED. Treated for severe dehydration and septic shock.     Second COVID swab pending   Below the knee DVT on the right UFH heparin infusion.  D dimer 8K    Culture negative sepsis  on cefepime    vanco given with ALLYSON which is now improved.  Check level.    No clear source  ?? PNA     Able to get Levophed off.   Taper vaso  Stress steroids til shock is resolved         If unresolved lingering sepsis would tap  shunt         Hosp day # 1  Vent day #1       Vital signs/reviewed and physical exam performed where pertinent and urgently required.    Lab/radiology studies/ABG/meds reviewed and interpreted into the assessment and treatment plan.    Assessment/Plan/Therapeutic interventions      Neuro: Sedation  as needed to facilitate safe ventilation  Propofol     Cor:  Pressor support as needed to maintain MAP 65.  Marked + fluid balance.  No further fluids      Pulm:  ARDS-NET 4-6cc/kg IBW TV as able to maintain plateau pressures <30. Prone ventilation consideration as feasible.  Pa02/Fi02 < 150 on Fi02 >60% and PEEP at least 5.    GI:  PPI  Enteric feeds as tolerated     Renal: Even to negative fluid balance as tolerated by hemodynamics and renal fx.      Heme:  Pharmacologic DVT P in addition to SCD's in low risk,  full AC in high risk  following D- Dimer clinical course and doppler studied where appropriate.   Full AC    ID: ABX discontinuation based on discussion with ID in conjunction with clinical features, culture data, and judicious procalcitonin monitoring.      Endo Aggressive glycemic control to limit FS glucose to < 180mg/dl.        Goals of care considerations:  Ongoing assessment for patient specific treatment options based on progression or decline.  I have involved the family with updates and requests in guidance for medical decision making.      32 minutes of critical care time spent in the management of this critically ill COVID-19 patient/PUI patient with continuous assessments and interventions based on the interpretation of multiple databases.

## 2020-05-08 NOTE — CONSULT NOTE ADULT - ASSESSMENT
A/P 68 yo M with PMHx of HTN, TBI, NPH with  shunt presented from NH for eval of hypoxia. Of note patient known to be COVID 19 positive 3 weeks ago. Per facility recent testing returned negative for COVID 19 infection.  Secondary to poor baseline mental status, increased work of breathing and hypoxia patient intubated emergent in the ED 5/7. Now in CCU /intubated    Decompensated overnight requiring TLC and A-line placement and initiation of pressors. Concern for PE remains     Hypoxic respiratory failure requiring intubation with Right sided pneumonia unclear if related to COVID 19  2. Septic shock   3. Acute renal failure with Hypernatremia  4. Metabolic encephalopathy  5. PMHx of HTN, Bipolar disorder, Glaucoma, TBI, NPH with  shuntARDSNet/Lung protective vent strategy.   Plan:  Maintain SpO2 > 90%. Titrate/wean as tolerated  - Propofol for sedation  - Dual pressor shock; Levophed and Vasopressin. Titrate to off as tolerated  - Continue free water flushes   - D5 @ 75 cc/hr   - Q6H BMP  - NPO with tube feeds  - Sliding scale insulin and accuchecks  - Continue Cefepime and Vanco  - Pending sputum, urine, blood cultures  - COVID PCR negative x 1, will repeat   - Ying placement, strict I&Os, renal dose meds, avoid nephrotoxins  - Continue home meds  - PPX: Pepcid and Heparin  - [facility]: Full CODE, Patient next of kin (first cousin), Anel Rg [phone], states she is patient health care agent and this is recognized by the court. She wishes for the patient to be DNR     PALLIATIVE: for [facility], pt currently intubated in ccu. Reviewed case w/ ccu team, per chart at baseline pt was deemed not able to make his own medical decisions due to history of dementia.  Discussed this AM, that upon admission here, pt was Full Code and was intubated in ED, now cousin Anel is asking for pt to be DNR. Cousin says she is pts legal guardian.  I called [facility] and spoke to RAYO Castro there ,  asking  for any documentation showing Anel is pts legal guardian and can make health care decisions , they said they will  fax me any documentation  they have on file.  I called and  spoke to Cousin Anel Rg, she explained she  has been his court appointed guardian for approx 2 years.  She did express for pt to be DNR at this time.  She stated she does have paperwork showing she can  make health care decisions and will be emailing it to me. A/P 68 yo M with PMHx of HTN, TBI, NPH with  shunt presented from NH for eval of hypoxia. Of note patient known to be COVID 19 positive 3 weeks ago. Per facility recent testing returned negative for COVID 19 infection.  Secondary to poor baseline mental status, increased work of breathing and hypoxia patient intubated emergent in the ED 5/7. Now in CCU /intubated    Decompensated overnight requiring TLC and A-line placement and initiation of pressors. Concern for PE remains     Hypoxic respiratory failure requiring intubation with Right sided pneumonia unclear if related to COVID 19  2. Septic shock   3. Acute renal failure with Hypernatremia  4. Metabolic encephalopathy  5. PMHx of HTN, Bipolar disorder, Glaucoma, TBI, NPH with  shuntARDSNet/Lung protective vent strategy.   Plan:  Maintain SpO2 > 90%. Titrate/wean as tolerated  - Propofol for sedation  - Dual pressor shock; Levophed and Vasopressin. Titrate to off as tolerated  - Continue free water flushes   - D5 @ 75 cc/hr   - Q6H BMP  - NPO with tube feeds  - Sliding scale insulin and accuchecks  - Continue Cefepime and Vanco  - Pending sputum, urine, blood cultures  - COVID PCR negative x 1, will repeat   - Ying placement, strict I&Os, renal dose meds, avoid nephrotoxins  - Continue home meds  - PPX: Pepcid and Heparin  - [facility]: Full CODE, Patient next of kin (first cousin), Anel Rg [phone], states she is patient health care agent and this is recognized by the court. She wishes for the patient to be DNR     PALLIATIVE: for [facility], pt currently intubated in ccu. Reviewed case w/ ccu team, per chart at baseline pt was deemed not able to make his own medical decisions due to history of dementia.  Discussed this AM, that upon admission here, pt was Full Code and was intubated in ED, now as per CCU team,  cousin Anel is asking for pt to be DNR. Cousin says she is pts legal guardian.  I called [facility] and spoke to RAYO Castro there ,  asking  for any documentation showing Anel is pts legal guardian and can make health care decisions , they said they will  fax me any documentation  they have on file.  I also called and  spoke to Cousin Anel Wonganco, she explained she  has been his court appointed guardian for approx 2 years.  She did also express to me  for pt to be DNR at this time.  She stated she does have paperwork showing she can  make health care decisions and will be emailing it to me.     ADD: paperwork received from Anel, reviewed it w/ ccu team and placed on pts chart , GOC discussion as per CCU  team , pt to be made DNR.

## 2020-05-08 NOTE — CHART NOTE - NSCHARTNOTEFT_GEN_A_CORE
Upon Nutritional Assessment by the Registered Dietitian your patient was determined to meet criteria / has evidence of the following diagnosis/diagnoses:                 [X] Severe Protein Calorie Malnutrition    Findings as based on:  [X] Comprehensive Nutrition Assessment   [X] Nutrition Focused Physical Exam - Loss of body fat & muscle wasting ( Temporal, Orbital, Clavicle, , Bicep, Tricep, Thigh, Hand Wasting Observed       Nutrition Plan/Recommendations:    1) Initiate En feeds to meet assessed needs (Nepro @ 25cc/hr x 24hrs )    PROVIDER Section:   By signing this assessment you are acknowledging and agree with the diagnosis/diagnoses assigned by the Registered Dietitian    Maribel Stone RDN

## 2020-05-08 NOTE — PROCEDURE NOTE - NSPROCDETAILS_GEN_ALL_CORE
sterile technique, catheter placed/ultrasound guidance/guidewire recovered/lumen(s) aspirated and flushed/sterile dressing applied
connected to a pressurized flush line/location identified, draped/prepped, sterile technique used, needle inserted/introduced/positive blood return obtained via catheter/sutured in place

## 2020-05-08 NOTE — PROGRESS NOTE ADULT - SUBJECTIVE AND OBJECTIVE BOX
CC: Patient is a 69y old  Male who presents with a chief complaint of hypoxic respiratory failure (07 May 2020 23:23)      S: Decompensated overnight requiring TLC and A-line placement and initiation of pressors. Concern for PE remain     Patient seen and examined at bedside.    ALLERGIES:  No Known Allergies      MEDICATIONS:  acetaminophen   Tablet .. 975 milliGRAM(s) Oral every 6 hours PRN  ALPRAZolam 0.125 milliGRAM(s) Oral three times a day  cefepime   IVPB 1000 milliGRAM(s) IV Intermittent daily  cefepime   IVPB      chlorhexidine 0.12% Liquid 15 milliLiter(s) Oral Mucosa every 12 hours  chlorhexidine 4% Liquid 1 Application(s) Topical <User Schedule>  dextrose 40% Gel 15 Gram(s) Oral once PRN  dextrose 5%. 1000 milliLiter(s) IV Continuous <Continuous>  dextrose 5%. 1000 milliLiter(s) IV Continuous <Continuous>  dextrose 50% Injectable 12.5 Gram(s) IV Push once  dextrose 50% Injectable 25 Gram(s) IV Push once  dextrose 50% Injectable 25 Gram(s) IV Push once  diVALproex Sprinkle 750 milliGRAM(s) Oral every 12 hours  famotidine    Tablet 20 milliGRAM(s) Oral daily  glucagon  Injectable 1 milliGRAM(s) IntraMuscular once PRN  heparin   Injectable 5500 Unit(s) IV Push every 6 hours PRN  heparin   Injectable 2500 Unit(s) IV Push every 6 hours PRN  heparin  Infusion.  Unit(s)/Hr IV Continuous <Continuous>  hydrocortisone sodium succinate Injectable 50 milliGRAM(s) IV Push every 6 hours  insulin lispro (HumaLOG) corrective regimen sliding scale   SubCutaneous every 6 hours  lamoTRIgine 100 milliGRAM(s) Oral two times a day  latanoprost 0.005% Ophthalmic Solution 1 Drop(s) Both EYES at bedtime  norepinephrine Infusion 0.05 MICROgram(s)/kG/Min IV Continuous <Continuous>  propofol Infusion 10 MICROgram(s)/kG/Min IV Continuous <Continuous>  vasopressin Infusion 0.04 Unit(s)/Min IV Continuous <Continuous>        Vital Signs Last 24 Hrs  T(F): 98.1 (08 May 2020 05:00), Max: 101 (07 May 2020 14:09)  HR: 92 (08 May 2020 08:28) (84 - 128)  BP: 128/76 (08 May 2020 08:00) (65/46 - 171/89)  RR: 22 (08 May 2020 08:00) (18 - 44)  SpO2: 100% (08 May 2020 08:28) (79% - 100%)  I&O's Summary    07 May 2020 07:01  -  08 May 2020 07:00  --------------------------------------------------------  IN: 5597 mL / OUT: 500 mL / NET: 5097 mL        PHYSICAL EXAM:  General: cachetic, sedated  ENT: dry oral mucosa  Neck: LIJ TLC with dressing c/d/i. Supple, No JVD  Lungs: ETT to vent, bilateral air entry  Cardio: RRR, S1/S2, No murmurs  Abdomen: Soft, Nontender, Nondistended; Bowel sounds present  : Ying in place  Extremities: No cyanosis, No edema  Neuro: sedated  Skin: no rashes  Psych: sedated    LABS:                        13.1   18.24 )-----------( 186      ( 08 May 2020 08:30 )             42.2     05-08    154  |  120  |  82  ----------------------------<  220  4.2   |  28  |  1.49    Ca    7.9      08 May 2020 08:30  Phos  3.5     05-  Mg     2.8     -    TPro  7.2  /  Alb  2.5  /  TBili  0.5  /  DBili  x   /  AST  25  /  ALT  20  /  AlkPhos  77  05-07    eGFR if Non African American: 48 mL/min/1.73M2 (20 @ 08:30)  eGFR if : 55 mL/min/1.73M2 (20 @ 08:30)    PT/INR - ( 07 May 2020 13:33 )   PT: 14.8 sec;   INR: 1.31 ratio         PTT - ( 08 May 2020 08:30 )  PTT:74.6 sec  Lactate, Blood: 4.3 mmol/L ( @ 03:26)  Lactate, Blood: 4.4 mmol/L ( @ 19:00)  Lactate, Blood: 8.3 mmol/L ( @ 13:33)    CARDIAC MARKERS ( 07 May 2020 13:33 )  <.017 ng/mL / x     / x     / x     / x                ABG - ( 07 May 2020 23:25 )  pH, Arterial: 7.21  pH, Blood: x     /  pCO2: 68    /  pO2: 103   / HCO3: x     / Base Excess: x     /  SaO2: 96                      POCT Blood Glucose.: 158 mg/dL (08 May 2020 11:41)  POCT Blood Glucose.: 208 mg/dL (08 May 2020 05:09)  POCT Blood Glucose.: 200 mg/dL (08 May 2020 01:53)  POCT Blood Glucose.: 142 mg/dL (07 May 2020 21:10)  POCT Blood Glucose.: 118 mg/dL (07 May 2020 16:54)      Urinalysis Basic - ( 07 May 2020 14:33 )    Color: Yellow / Appearance: Clear / S.020 / pH: x  Gluc: x / Ketone: Trace  / Bili: Negative / Urobili: Negative   Blood: x / Protein: 30 mg/dL / Nitrite: Negative   Leuk Esterase: Small / RBC: 5-10 /HPF / WBC 3-5 /HPF   Sq Epi: x / Non Sq Epi: Neg.-Few / Bacteria: Few /HPF          RADIOLOGY & ADDITIONAL TESTS:    Care Discussed with Consultants/Other Providers: CC: Patient is a 69y old  Male who presents with a chief complaint of hypoxic respiratory failure (07 May 2020 23:23)      S: Decompensated overnight requiring TLC and A-line placement and initiation of pressors. Concern for PE remain and is on heparin drip    Patient seen and examined at bedside.  sedated,     ALLERGIES:  No Known Allergies      MEDICATIONS:  acetaminophen   Tablet .. 975 milliGRAM(s) Oral every 6 hours PRN  ALPRAZolam 0.125 milliGRAM(s) Oral three times a day  cefepime   IVPB 1000 milliGRAM(s) IV Intermittent daily  cefepime   IVPB      chlorhexidine 0.12% Liquid 15 milliLiter(s) Oral Mucosa every 12 hours  chlorhexidine 4% Liquid 1 Application(s) Topical <User Schedule>  dextrose 40% Gel 15 Gram(s) Oral once PRN  dextrose 5%. 1000 milliLiter(s) IV Continuous <Continuous>  dextrose 5%. 1000 milliLiter(s) IV Continuous <Continuous>  dextrose 50% Injectable 12.5 Gram(s) IV Push once  dextrose 50% Injectable 25 Gram(s) IV Push once  dextrose 50% Injectable 25 Gram(s) IV Push once  diVALproex Sprinkle 750 milliGRAM(s) Oral every 12 hours  famotidine    Tablet 20 milliGRAM(s) Oral daily  glucagon  Injectable 1 milliGRAM(s) IntraMuscular once PRN  heparin   Injectable 5500 Unit(s) IV Push every 6 hours PRN  heparin   Injectable 2500 Unit(s) IV Push every 6 hours PRN  heparin  Infusion.  Unit(s)/Hr IV Continuous <Continuous>  hydrocortisone sodium succinate Injectable 50 milliGRAM(s) IV Push every 6 hours  insulin lispro (HumaLOG) corrective regimen sliding scale   SubCutaneous every 6 hours  lamoTRIgine 100 milliGRAM(s) Oral two times a day  latanoprost 0.005% Ophthalmic Solution 1 Drop(s) Both EYES at bedtime  norepinephrine Infusion 0.05 MICROgram(s)/kG/Min IV Continuous <Continuous>  propofol Infusion 10 MICROgram(s)/kG/Min IV Continuous <Continuous>  vasopressin Infusion 0.04 Unit(s)/Min IV Continuous <Continuous>        Vital Signs Last 24 Hrs  T(F): 98.1 (08 May 2020 05:00), Max: 101 (07 May 2020 14:09)  HR: 92 (08 May 2020 08:28) (84 - 128)  BP: 128/76 (08 May 2020 08:00) (65/46 - 171/89)  RR: 22 (08 May 2020 08:00) (18 - 44)  SpO2: 100% (08 May 2020 08:28) (79% - 100%)  I&O's Summary    07 May 2020 07:01  -  08 May 2020 07:00  --------------------------------------------------------  IN: 5597 mL / OUT: 500 mL / NET: 5097 mL        PHYSICAL EXAM:  General: cachetic, sedated  ENT: dry oral mucosa  Neck: LIJ TLC with dressing c/d/i. Supple, No JVD  Lungs: ETT to vent, bilateral air entry  Cardio: RRR, S1/S2, No murmurs  Abdomen: Soft, Nontender, Nondistended; Bowel sounds present  : Ying in place  Extremities: No cyanosis, No edema  Neuro: sedated  Skin: no rashes  Psych: sedated    LABS:                        13.1   18.24 )-----------( 186      ( 08 May 2020 08:30 )             42.2     05-08    154  |  120  |  82  ----------------------------<  220  4.2   |  28  |  1.49    Ca    7.9      08 May 2020 08:30  Phos  3.5     05-  Mg     2.8     -    TPro  7.2  /  Alb  2.5  /  TBili  0.5  /  DBili  x   /  AST  25  /  ALT  20  /  AlkPhos  77  05-07    eGFR if Non African American: 48 mL/min/1.73M2 (20 @ 08:30)  eGFR if : 55 mL/min/1.73M2 (20 @ 08:30)    PT/INR - ( 07 May 2020 13:33 )   PT: 14.8 sec;   INR: 1.31 ratio         PTT - ( 08 May 2020 08:30 )  PTT:74.6 sec  Lactate, Blood: 4.3 mmol/L ( @ 03:26)  Lactate, Blood: 4.4 mmol/L (05-07 @ 19:00)  Lactate, Blood: 8.3 mmol/L ( 13:33)    CARDIAC MARKERS ( 07 May 2020 13:33 )  <.017 ng/mL / x     / x     / x     / x      COVID  20 @ 13:33  COVID -   NotDetec      COVID Biomarkers    20 @ 13:33 ESR --  ---  CRP 16.66<H>  ---  DDimer  8630   ---   LDH --   ---   Ferritin 628<H>        WBC Trend  20 @ 08:30   -  18.24<H>  20 @ 03:26   -  18.06<H>  20 @ 13:33   -  21.84<H>    H/H Trend  20 @ 08:30   -  13.1 / 42.2  20 @ 03:26   -  12.7<L> / 42.4  20 @ 13:33   -  16.8 / 55.2<H>    Platelet Trend  20 @ 08:30   -  186  20 @ 03:26   -  182  20 @ 13:33   -  275    Trend Sodium  20 @ 08:30   -  154<H>  20 @ 03:26   -  159<H>  20 @ 19:00   -  168<HH>  20 @ 13:33   -  166<HH>    Trend Potassium  20 @ 08:30   -  4.2  20 @ 03:26   -  3.9  20 @ 19:00   -  5.4<H>  20 @ 13:33   -  4.8    Trend Bun/Cr  20 @ 08:30  BUN/CR -  82<H> / 1.49<H>  20 @ 03:26  BUN/CR -  90<H> / 1.94<H>  20 @ 19:00  BUN/CR -  92<H> / 2.33<H>  20 @ 13:33  BUN/CR -  84<H> / 2.28<H>  20 @ 06:55  BUN/CR -  15 / 0.68  01-21-20 @ 02:46  BUN/CR -  12 / 0.66  20 @ 17:38  BUN/CR -  17 / 0.85  20 @ 14:00  BUN/CR -  19 / 1.20  19 @ 12:17  BUN/CR -  15 / 0.77  0819 @ 16:15  BUN/CR -  14 / 0.88      Lactic Acid Trend  20 @ 03:26   -   4.3<HH>  20 @ 19:00   -   4.4<HH>  20 @ 13:33   -   8.3<HH>    Trend AST/ALT/ALK Phos/Bili  20 @ 13:33   25/20/77/0.5  20 @ 17:38   8<L>/8<L>/80/0.4  20 14:00   8<L>/12/84/0.4  19 @ 16:15   7<L>/98/0.3        Albumin Trend  20 @ 13:33   -   2.5<L>  20 17:38   -   3.5  20 @ 14:00   -   3.3  19 @ 16:15   -   3.5      PTT - PT - INR Trend  20 @ 08:30   -   74.6<H> - -- - --  20 @ 13:33   -   27.7 - 14.8<H> - 1.31<H>  20 @ 14:00   -   30.7 - 12.8 - 1.14    Glucose Trend  20 @ 11:41   -  -- -- 158<H>  20 @ 08:30   -  -- 220<H> --  20 @ 05:09   -  -- -- 208<H>  20 @ 03:26   -  -- 271<H> --  20 @ 01:53   -  -- -- 200<H>  20 @ 21:10   -  -- -- 142<H>  20 @ 19:00   -  -- 153<H> --  20 @ 16:54   -  -- -- 118<H>  20 @ 13:33   -  -- 279<H> --      ABG Trend  20 @ 23:25   - 7.21<L>/68<H>/103/96  20 @ 16:25   - 7.19<LL>/75<HH>/90/93  20 @ 13:35   - 7.41/34/50<LL>/83<L>        POCT Blood Glucose.: 158 mg/dL (08 May 2020 11:41)  POCT Blood Glucose.: 208 mg/dL (08 May 2020 05:09)  POCT Blood Glucose.: 200 mg/dL (08 May 2020 01:53)  POCT Blood Glucose.: 142 mg/dL (07 May 2020 21:10)  POCT Blood Glucose.: 118 mg/dL (07 May 2020 16:54)      Urinalysis Basic - ( 07 May 2020 14:33 )    Color: Yellow / Appearance: Clear / S.020 / pH: x  Gluc: x / Ketone: Trace  / Bili: Negative / Urobili: Negative   Blood: x / Protein: 30 mg/dL / Nitrite: Negative   Leuk Esterase: Small / RBC: 5-10 /HPF / WBC 3-5 /HPF   Sq Epi: x / Non Sq Epi: Neg.-Few / Bacteria: Few /HPF          RADIOLOGY & ADDITIONAL TESTS:    Care Discussed with Consultants/Other Providers:     ECHO  < from: TTE Echo Complete w/o contrast w/ Doppler (20 @ 08:08) >    Summary:   1. Normal global left ventricular systolic function.   2. Spectral Doppler shows impaired relaxation pattern of left ventricular myocardial filling (Grade I diastolic dysfunction).   3. There is no evidence of pericardial effusion.   4. Thickening of the anterior and posterior mitral valve leaflets.   5. Sclerotic aortic valve with normal opening.    < end of copied text >

## 2020-05-08 NOTE — CHART NOTE - NSCHARTNOTEFT_GEN_A_CORE
Nutrition Initial Assessment    Nutrition Consult Received: Yes [   ]  No [ X  ]    Reason for Initial Nutrition Assessment: CCU admission     Source of Information: Unable to conduct a fact to face interview due to limited contact restrictions related to pt's medical condition and isolation precautions. Patient intubated at present     Admitting Diagnosis: 69y Male admitted for Acute respiratory failure with hypoxia    PAST MEDICAL & SURGICAL HISTORY:  Bipolar disorder  UTI (urinary tract infection)  Dysphonia  Metabolic encephalopathy  COVID-19  Altered mental status  Traumatic brain injury, without loss of consciousness, initial encounter  Hypertension  Personal history of spine surgery      Subjective Information: n/a , RD noted reviewed from last noted admission in EMR , (8/20/19) patient body weight noted at 177lbs & po intake documented good however patient noted cachetic , ? po intake  PTA . Propofol infusing at 4.08ml/hr 107.7calories provided.      GI Issues: no BM noted , ? recent     Current Nutrition Order: NPO , patient noted on 2 vasopressors     Skin Integrity: suspected DTI noted on coccyx & (L) buttocks .      Labs:   05-08 Na154 mmol/L<H> Glu 220 mg/dL<H> K+ 4.2 mmol/L Cr  1.49 mg/dL<H> BUN 82 mg/dL<H> Phos n/a   Alb n/a   PAB n/a           POCT Blood Glucose.: 208 mg/dL (05-08-20 @ 05:09)  POCT Blood Glucose.: 200 mg/dL (05-08-20 @ 01:53)  POCT Blood Glucose.: 142 mg/dL (05-07-20 @ 21:10)  POCT Blood Glucose.: 118 mg/dL (05-07-20 @ 16:54)    Medications:  MEDICATIONS  (STANDING):  ALPRAZolam 0.125 milliGRAM(s) Oral three times a day  cefepime   IVPB 1000 milliGRAM(s) IV Intermittent daily  cefepime   IVPB      chlorhexidine 0.12% Liquid 15 milliLiter(s) Oral Mucosa every 12 hours  chlorhexidine 4% Liquid 1 Application(s) Topical <User Schedule>  dextrose 5%. 1000 milliLiter(s) (75 mL/Hr) IV Continuous <Continuous>  dextrose 5%. 1000 milliLiter(s) (50 mL/Hr) IV Continuous <Continuous>  dextrose 50% Injectable 12.5 Gram(s) IV Push once  dextrose 50% Injectable 25 Gram(s) IV Push once  dextrose 50% Injectable 25 Gram(s) IV Push once  diVALproex Sprinkle 750 milliGRAM(s) Oral every 12 hours  famotidine    Tablet 20 milliGRAM(s) Oral daily  heparin  Infusion.  Unit(s)/Hr (12 mL/Hr) IV Continuous <Continuous>  hydrocortisone sodium succinate Injectable 50 milliGRAM(s) IV Push every 6 hours  insulin lispro (HumaLOG) corrective regimen sliding scale   SubCutaneous every 6 hours  lamoTRIgine 100 milliGRAM(s) Oral two times a day  latanoprost 0.005% Ophthalmic Solution 1 Drop(s) Both EYES at bedtime  norepinephrine Infusion 0.05 MICROgram(s)/kG/Min (6.38 mL/Hr) IV Continuous <Continuous>  propofol Infusion 10 MICROgram(s)/kG/Min (4.08 mL/Hr) IV Continuous <Continuous>  vasopressin Infusion 0.04 Unit(s)/Min (2.4 mL/Hr) IV Continuous <Continuous>    MEDICATIONS  (PRN):  acetaminophen   Tablet .. 975 milliGRAM(s) Oral every 6 hours PRN Temp greater or equal to 38C (100.4F)  dextrose 40% Gel 15 Gram(s) Oral once PRN Blood Glucose LESS THAN 70 milliGRAM(s)/deciliter  glucagon  Injectable 1 milliGRAM(s) IntraMuscular once PRN Glucose LESS THAN 70 milligrams/deciliter  heparin   Injectable 5500 Unit(s) IV Push every 6 hours PRN For aPTT less than 40  heparin   Injectable 2500 Unit(s) IV Push every 6 hours PRN For aPTT between 40 - 57    Admitted Anthropometrics:    Height (cm): 177.8 (05-07-20 @ 13:20)- 5'10"   Weight (kg): 59.1kg/130.8  BMI (kg/m2): 18.7    Nutrition Focused Physical Exam: Unable to complete due to limited isolation contact precautions at this time.     Estimated Energy Needs 1773 kcal/kg- 2068 kcal/kg (30-35cal/kg )  Estimated Protein Needs 71g/kg- 82g/kg ( 1.2-1.4g/kg )  Based on weight of: current weight : 130/59.1kg     [  X] Nutrition Diagnosis: Severe Malnutrition related inadequate nutrient intake as evidence by loss of body fat & muscle wasting .(Temporal, Clavicle, Orbital, Triceps, Biceps , Hand wasting observed)      Goal: Initiate En feeds as medically indicated while patient intubated.    Nutrition Interventions: Provide EN feeds to meet assessed needs     Recommendations: Initiate EN feeds to Nepro @ 25cc/hr x 24hrs 1080cal/48.9g protein. advance as tolerated  to meet assessed needs based on propofol infusion.       RD to follow-up per protocol.

## 2020-05-08 NOTE — PROGRESS NOTE ADULT - ASSESSMENT
ASSESSMENT:  1. Hypoxic respiratory failure requiring intubation with Right sided pneumonia unclear if related to COVID 19  2. Septic shock   3. Acute renal failure with Hypernatremia  4. Metabolic encephalopathy  5. PMHx of HTN, Bipolar disorder, Glaucoma, TBI, NPH with  shunt    PLAN:  - ARDSNet/Lung protective vent strategy. Maintain SpO2 > 90%. Titrate/wean as tolerated  - Propofol for sedation  - Dual pressor shock; Levophed and Vasopressin. Titrate to off as tolerated  - Continue free water flushes   - D5 @ 75 cc/hr   - Q6H BMP  - NPO with tube feeds  - Sliding scale insulin and accuchecks  - Continue Cefepime and Vanco  - Pending sputum, urine, blood cultures  - COVID PCR negative x 1, will repeat   - Ying placement, strict I&Os, renal dose meds, avoid nephrotoxins  - Continue home meds  - PPX: Pepcid and Heparin  - GOC: Full CODE, Patient next of kin (first cousin), Anel Wonganco [phone], states she is patient health care agent and this is recognized by the court. She wishes for the patient to be DNR however no Anel does not possess paperwork that identifies her as the patients healthcare agent. Will involve Palliative care and CM/SW. ASSESSMENT:  1. Hypoxic respiratory failure requiring intubation with Right sided pneumonia unclear if related to COVID 19  2. Septic shock on pressors  3. Acute renal failure with Hypernatremia with lactic acidosis  4. Metabolic encephalopathy  5. PMHx of HTN, Bipolar disorder, Glaucoma, TBI, NPH with  shunt  6. Elevated D-Dimer- high suspicion for VTE     PLAN:  - ARDSNet/Lung protective vent strategy. Maintain SpO2 > 90%. Titrate/wean as tolerated  - Propofol for sedation  - Dual pressor shock; Levophed and Vasopressin. Titrate to off as tolerated  - Continue free water flushes   - D5 @ 75 cc/hr   - Q6H BMP  - heparin drip for a.c  - NPO with tube feeds  - Sliding scale insulin and accuchecks  - Continue Cefepime and Vanco  - Pending sputum, urine, blood cultures  - COVID PCR negative x 1, will repeat   - Ying placement, strict I&Os, renal dose meds, avoid nephrotoxins  - Continue home meds  - PPX: Pepcid and Heparin  - GOC: Full CODE, Patient next of kin (first cousin), Anel Ifrah [phone], states she is patient health care agent and this is recognized by the court. She wishes for the patient to be DNR however no Anel does not possess paperwork that identifies her as the patients healthcare agent. Will involve Palliative care and CM/SW.   -Palliative care f/u to see if above can make decision.

## 2020-05-09 LAB
ANION GAP SERPL CALC-SCNC: 8 MMOL/L — SIGNIFICANT CHANGE UP (ref 5–17)
APTT BLD: 71.7 SEC — HIGH (ref 27.5–36.3)
BUN SERPL-MCNC: 88 MG/DL — HIGH (ref 7–23)
CALCIUM SERPL-MCNC: 7.6 MG/DL — LOW (ref 8.4–10.5)
CHLORIDE SERPL-SCNC: 115 MMOL/L — HIGH (ref 96–108)
CO2 BLDA-SCNC: 26 MMOL/L — SIGNIFICANT CHANGE UP (ref 22–30)
CO2 BLDA-SCNC: 26 MMOL/L — SIGNIFICANT CHANGE UP (ref 22–30)
CO2 SERPL-SCNC: 26 MMOL/L — SIGNIFICANT CHANGE UP (ref 22–31)
CREAT SERPL-MCNC: 1.21 MG/DL — SIGNIFICANT CHANGE UP (ref 0.5–1.3)
GAS PNL BLDA: SIGNIFICANT CHANGE UP
GLUCOSE BLDC GLUCOMTR-MCNC: 122 MG/DL — HIGH (ref 70–99)
GLUCOSE BLDC GLUCOMTR-MCNC: 140 MG/DL — HIGH (ref 70–99)
GLUCOSE BLDC GLUCOMTR-MCNC: 154 MG/DL — HIGH (ref 70–99)
GLUCOSE BLDC GLUCOMTR-MCNC: 178 MG/DL — HIGH (ref 70–99)
GLUCOSE SERPL-MCNC: 204 MG/DL — HIGH (ref 70–99)
HCT VFR BLD CALC: 37.2 % — LOW (ref 39–50)
HGB BLD-MCNC: 11.9 G/DL — LOW (ref 13–17)
HOROWITZ INDEX BLDA+IHG-RTO: SIGNIFICANT CHANGE UP
HOROWITZ INDEX BLDA+IHG-RTO: SIGNIFICANT CHANGE UP
MCHC RBC-ENTMCNC: 31.6 PG — SIGNIFICANT CHANGE UP (ref 27–34)
MCHC RBC-ENTMCNC: 32 GM/DL — SIGNIFICANT CHANGE UP (ref 32–36)
MCV RBC AUTO: 98.7 FL — SIGNIFICANT CHANGE UP (ref 80–100)
NRBC # BLD: 0 /100 WBCS — SIGNIFICANT CHANGE UP (ref 0–0)
PCO2 BLDA: 35 MMHG — SIGNIFICANT CHANGE UP (ref 32–46)
PCO2 BLDA: 37 MMHG — SIGNIFICANT CHANGE UP (ref 32–46)
PH BLDA: 7.43 — SIGNIFICANT CHANGE UP (ref 7.35–7.45)
PH BLDA: 7.46 — HIGH (ref 7.35–7.45)
PLATELET # BLD AUTO: 148 K/UL — LOW (ref 150–400)
PO2 BLDA: 63 MMHG — LOW (ref 74–108)
PO2 BLDA: 69 MMHG — LOW (ref 74–108)
POTASSIUM SERPL-MCNC: 4 MMOL/L — SIGNIFICANT CHANGE UP (ref 3.5–5.3)
POTASSIUM SERPL-SCNC: 4 MMOL/L — SIGNIFICANT CHANGE UP (ref 3.5–5.3)
RBC # BLD: 3.77 M/UL — LOW (ref 4.2–5.8)
RBC # FLD: 15.9 % — HIGH (ref 10.3–14.5)
SAO2 % BLDA: 92 % — SIGNIFICANT CHANGE UP (ref 92–96)
SAO2 % BLDA: 95 % — SIGNIFICANT CHANGE UP (ref 92–96)
SARS-COV-2 RNA SPEC QL NAA+PROBE: SIGNIFICANT CHANGE UP
SODIUM SERPL-SCNC: 149 MMOL/L — HIGH (ref 135–145)
VANCOMYCIN FLD-MCNC: 4.6 UG/ML — SIGNIFICANT CHANGE UP
WBC # BLD: 15.28 K/UL — HIGH (ref 3.8–10.5)
WBC # FLD AUTO: 15.28 K/UL — HIGH (ref 3.8–10.5)

## 2020-05-09 RX ORDER — CHLORHEXIDINE GLUCONATE 213 G/1000ML
15 SOLUTION TOPICAL EVERY 12 HOURS
Refills: 0 | Status: DISCONTINUED | OUTPATIENT
Start: 2020-05-09 | End: 2020-05-09

## 2020-05-09 RX ORDER — VANCOMYCIN HCL 1 G
750 VIAL (EA) INTRAVENOUS EVERY 12 HOURS
Refills: 0 | Status: DISCONTINUED | OUTPATIENT
Start: 2020-05-09 | End: 2020-05-13

## 2020-05-09 RX ORDER — HEPARIN SODIUM 5000 [USP'U]/ML
5500 INJECTION INTRAVENOUS; SUBCUTANEOUS EVERY 6 HOURS
Refills: 0 | Status: DISCONTINUED | OUTPATIENT
Start: 2020-05-09 | End: 2020-05-09

## 2020-05-09 RX ORDER — HEPARIN SODIUM 5000 [USP'U]/ML
5500 INJECTION INTRAVENOUS; SUBCUTANEOUS ONCE
Refills: 0 | Status: DISCONTINUED | OUTPATIENT
Start: 2020-05-09 | End: 2020-05-09

## 2020-05-09 RX ORDER — HEPARIN SODIUM 5000 [USP'U]/ML
2500 INJECTION INTRAVENOUS; SUBCUTANEOUS EVERY 6 HOURS
Refills: 0 | Status: DISCONTINUED | OUTPATIENT
Start: 2020-05-09 | End: 2020-05-09

## 2020-05-09 RX ORDER — HEPARIN SODIUM 5000 [USP'U]/ML
INJECTION INTRAVENOUS; SUBCUTANEOUS
Qty: 25000 | Refills: 0 | Status: DISCONTINUED | OUTPATIENT
Start: 2020-05-09 | End: 2020-05-10

## 2020-05-09 RX ADMIN — CEFEPIME 100 MILLIGRAM(S): 1 INJECTION, POWDER, FOR SOLUTION INTRAMUSCULAR; INTRAVENOUS at 11:53

## 2020-05-09 RX ADMIN — SODIUM CHLORIDE 75 MILLILITER(S): 9 INJECTION, SOLUTION INTRAVENOUS at 14:08

## 2020-05-09 RX ADMIN — LATANOPROST 1 DROP(S): 0.05 SOLUTION/ DROPS OPHTHALMIC; TOPICAL at 21:58

## 2020-05-09 RX ADMIN — Medication 2: at 06:11

## 2020-05-09 RX ADMIN — Medication 0.12 MILLIGRAM(S): at 21:57

## 2020-05-09 RX ADMIN — LAMOTRIGINE 100 MILLIGRAM(S): 25 TABLET, ORALLY DISINTEGRATING ORAL at 05:45

## 2020-05-09 RX ADMIN — FAMOTIDINE 20 MILLIGRAM(S): 10 INJECTION INTRAVENOUS at 11:53

## 2020-05-09 RX ADMIN — DIVALPROEX SODIUM 750 MILLIGRAM(S): 500 TABLET, DELAYED RELEASE ORAL at 05:45

## 2020-05-09 RX ADMIN — CHLORHEXIDINE GLUCONATE 1 APPLICATION(S): 213 SOLUTION TOPICAL at 06:23

## 2020-05-09 RX ADMIN — Medication 50 MILLIGRAM(S): at 11:53

## 2020-05-09 RX ADMIN — Medication 0.12 MILLIGRAM(S): at 05:43

## 2020-05-09 RX ADMIN — Medication 2: at 17:37

## 2020-05-09 RX ADMIN — LAMOTRIGINE 100 MILLIGRAM(S): 25 TABLET, ORALLY DISINTEGRATING ORAL at 17:36

## 2020-05-09 RX ADMIN — DIVALPROEX SODIUM 750 MILLIGRAM(S): 500 TABLET, DELAYED RELEASE ORAL at 17:36

## 2020-05-09 RX ADMIN — CHLORHEXIDINE GLUCONATE 15 MILLILITER(S): 213 SOLUTION TOPICAL at 05:44

## 2020-05-09 RX ADMIN — PROPOFOL 4.08 MICROGRAM(S)/KG/MIN: 10 INJECTION, EMULSION INTRAVENOUS at 00:42

## 2020-05-09 RX ADMIN — Medication 250 MILLIGRAM(S): at 17:37

## 2020-05-09 RX ADMIN — Medication 0: at 23:48

## 2020-05-09 RX ADMIN — Medication 50 MILLIGRAM(S): at 05:44

## 2020-05-09 RX ADMIN — Medication 50 MILLIGRAM(S): at 17:36

## 2020-05-09 RX ADMIN — Medication 50 MILLIGRAM(S): at 23:47

## 2020-05-09 RX ADMIN — HEPARIN SODIUM 1200 UNIT(S)/HR: 5000 INJECTION INTRAVENOUS; SUBCUTANEOUS at 08:00

## 2020-05-09 RX ADMIN — Medication 0.12 MILLIGRAM(S): at 14:08

## 2020-05-09 NOTE — PROGRESS NOTE ADULT - ASSESSMENT
ASSESSMENT:  1. Hypoxic respiratory failure requiring intubation with Right sided pneumonia unclear if related to COVID 19  2. Septic shock on pressors  3. Acute renal failure with Hypernatremia with lactic acidosis - improving  4. Metabolic encephalopathy  5. PMHx of HTN, Bipolar disorder, Glaucoma, TBI, NPH with  shunt  6. Elevated D-Dimer- high suspicion for VTE     PLAN:  - Extubate to Venturi-Mask, obtain post-extubation ABG  - Vasopressor to maintain MAP of 65, bridge to off with Midodrine  - Continue free water flushes - Na 149  - D5 @ 75 cc/hr   - Q6H BMP  - NPO with tube feeds  - Sliding scale insulin and accuchecks  - Continue Cefepime and Vanco  - Pending sputum, urine, blood cultures  - COVID PCR negative x 1, will repeat   - Ying placement, strict I&Os, renal dose meds, avoid nephrotoxins  - Continue home meds  - PPX: Pepcid and Heparin gtt  - GOC: DNR ASSESSMENT:  1. Hypoxic respiratory failure requiring intubation with Right sided pneumonia unclear if related to COVID 19 vs Suspected PE  2. Septic/hypovolumic shock Resolving now off  pressors  3. Acute renal failure with Hypernatremia with lactic acidosis - Resolving  4. Metabolic encephalopathy ? now back to baseline  5. PMHx of HTN, Bipolar disorder, Glaucoma, TBI, NPH with  shunt  6. Elevated D-Dimer- high suspicion for VTE - Right Calf DVT on US    PLAN:  - Extubate to Venturi-Mask, obtain post-extubation ABG,   - taper vasopressors to off.   - Continue free water flushes - Na 149  - D5 @ 75 cc/hr   - Q12H BMP  - NPO with tube feeds  - taper stress dose steroids in am, if stable    - Sliding scale insulin and Accu-Cheks  - Continue Cefepime and Vanco  - Pending sputum, urine, blood cultures  - COVID PCR negative x 1, will repeat   - Ying placement, strict I&Os, renal dose meds, avoid nephrotoxins  - Continue home meds  - PPX: Pepcid and Heparin gtt  - GOC: DNR

## 2020-05-09 NOTE — PROGRESS NOTE ADULT - SUBJECTIVE AND OBJECTIVE BOX
HPI: 69M COVID 19 +, developed worsening hypoxemic resp. failure, ARDS, and required intubation and mechanical vent support. patient was wsucessfuly ectubated today, and currently is on 50% centi mask as O2 source.       Current Vent Settings:    EXTUBATED TODAY  currently on 50% venti mask      Vitalsigns/reviewed and physical exam performed where pertinent and urgently required.    Lab/radiology studies/ABG/Meds reviewed and interpreted into the assesment and treatment plan.    Assessment/Plan/Therapeutic interventions      Neuro: Sedation neuromuscular blockade as needed to facilitate safe ventilation    Cor:  Pressor support as needed to maintain MAP 65.  Avoiding fluid challenges.     Pulm:  ARDS-NET 4-6cc/kg IBW TV as able to maintain plateau pressures <30. Prone ventilation consideration as feasable.  Pa02/Fi02 < 150 on Fi02 >60% and PEEP at least 5.    GI:  PPI  Enteric feeds as tolerated in tandem with NMB and prone ventilation    Renal: Even to negative fluid balance as tolerated by hemodynamics and renal fx.  Feeds to be provided in lieu of IVF.     Heme:  Pharmacologic DVT P in addition to SCD's    ID: ABX discontinuation based on discussion with ID in conjunction with clinical features, culture data, and judicious procalcitonin monitoring.      Endo Aggressive glycemic control to limit FS glucose to < 180mg/dl.        COVID 19 specific considerations and therapeuric options based on the available and rapidly changing literature    Goals of care considerations:  Ongoing assessment for patient specific treatment options based on progression or decline.  I have involved the family with updates and requests in guidance for medical decision making.      35 minutes of critical care time spent in the management of this critically ill COVID-19 patient/PUI patient with continuous assessments and interventions based on the interpretation of multiple databases. HPI: 69M COVID 19 +, developed worsening hypoxemic resp. failure, ARDS, and required intubation and mechanical vent support. patient was wsucessfuly ectubated today, and currently is on 50% centi mask as O2 source.       Current Vent Settings:    EXTUBATED TODAY  currently on 50% venti mask      Vitalsigns/reviewed and physical exam performed where pertinent and urgently required.    Lab/radiology studies/ABG/Meds reviewed and interpreted into the assesment and treatment plan.    Assessment/Plan/Therapeutic interventions      Neuro: Sholding sedation at this time, as patient was extubatedtoday. Currently patient calm, arousable.    Cor:  currently HD stable, rate controlled. On heparin infusion using full A/C nomogram     Pulm:  extubated today.  -currently on 50% venti mask, tolrating well  -will continue to wean FIO2 to goal of nasal canula, maintain sat >90%  -aggressive chest pt and pulm. toliet    GI:  start diet once more awake and interactive. maintain stress ulcer prophylaxis    Renal: Even to negative fluid balance goal. Hypernatremic and hyperchloremic. Will continue D5 and trend lytes daily    Heme:  heparin gtt using full A/C nomogram    ID: currently on  cefepime and vancomycin, awaiting culture results, then narrow anbx as able    Endo Aggressive glycemic control to limit FS glucose to < 180mg/dl.        COVID 19 specific considerations and therapeuric options based on the available and rapidly changing literature    Goals of care considerations:  Ongoing assessment for patient specific treatment options based on progression or decline.  I have involved the family with updates and requests in guidance for medical decision making.

## 2020-05-09 NOTE — PROGRESS NOTE ADULT - SUBJECTIVE AND OBJECTIVE BOX
CC: Patient is a 69y old  Male who presents with a chief complaint of hypoxic respiratory failure (08 May 2020 23:54)      S: Patient remains on non-titrateable Vasopressin with MAP >/= 65. Minimal settings on vent FiO2 30% and PEEP 5. Tolerating SBT well. No events overnight    Patient seen and examined at bedside.    ALLERGIES:  No Known Allergies      MEDICATIONS:  acetaminophen   Tablet .. 975 milliGRAM(s) Oral every 6 hours PRN  ALPRAZolam 0.125 milliGRAM(s) Oral three times a day  cefepime   IVPB 1000 milliGRAM(s) IV Intermittent daily  cefepime   IVPB      chlorhexidine 4% Liquid 1 Application(s) Topical <User Schedule>  dextrose 40% Gel 15 Gram(s) Oral once PRN  dextrose 5%. 1000 milliLiter(s) IV Continuous <Continuous>  dextrose 5%. 1000 milliLiter(s) IV Continuous <Continuous>  dextrose 50% Injectable 12.5 Gram(s) IV Push once  dextrose 50% Injectable 25 Gram(s) IV Push once  dextrose 50% Injectable 25 Gram(s) IV Push once  diVALproex Sprinkle 750 milliGRAM(s) Oral every 12 hours  famotidine    Tablet 20 milliGRAM(s) Oral daily  glucagon  Injectable 1 milliGRAM(s) IntraMuscular once PRN  heparin  Infusion.  Unit(s)/Hr IV Continuous <Continuous>  hydrocortisone sodium succinate Injectable 50 milliGRAM(s) IV Push every 6 hours  insulin lispro (HumaLOG) corrective regimen sliding scale   SubCutaneous every 6 hours  lamoTRIgine 100 milliGRAM(s) Oral two times a day  latanoprost 0.005% Ophthalmic Solution 1 Drop(s) Both EYES at bedtime  norepinephrine Infusion 0.05 MICROgram(s)/kG/Min IV Continuous <Continuous>  propofol Infusion 10 MICROgram(s)/kG/Min IV Continuous <Continuous>  vasopressin Infusion 0.04 Unit(s)/Min IV Continuous <Continuous>        Vital Signs Last 24 Hrs  T(F): 99.3 (09 May 2020 08:00), Max: 99.6 (09 May 2020 04:00)  HR: 72 (09 May 2020 09:00) (72 - 97)  BP: 104/58 (09 May 2020 09:00) (83/53 - 147/86)  RR: 19 (09 May 2020 09:00) (17 - 29)  SpO2: 100% (09 May 2020 09:00) (97% - 100%)  I&O's Summary    08 May 2020 07:01  -  09 May 2020 07:00  --------------------------------------------------------  IN: 1762.5 mL / OUT: 765 mL / NET: 997.5 mL    09 May 2020 07:  -  09 May 2020 10:04  --------------------------------------------------------  IN: 178.8 mL / OUT: 60 mL / NET: 118.8 mL        PHYSICAL EXAM:  General: Cachetic   ENT: dry oral mucosa, NGT in place  Neck: LIJ TLC with dressing c/d/i. Supple, No JVD  Lungs: ETT to vent. Bilateral air entry  Cardio: RRR, S1/S2, No murmurs  Abdomen: Soft, Nontender, Nondistended; Bowel sounds present  Extremities: No cyanosis, No edema  Neuro: no new deficits  Skin: no rashes  Psych: sedated    LABS:                        11.9   15.28 )-----------( 148      ( 09 May 2020 05:30 )             37.2     05-09    149  |  115  |  88  ----------------------------<  204  4.0   |  26  |  1.21    Ca    7.6      09 May 2020 05:30  Phos  3.5     05-08  Mg     2.8     05-08    TPro  7.2  /  Alb  2.5  /  TBili  0.5  /  DBili  x   /  AST  25  /  ALT  20  /  AlkPhos  77  05-07    eGFR if Non African American: 62 mL/min/1.73M2 (20 @ 05:30)  eGFR if : 71 mL/min/1.73M2 (20 @ 05:30)    PT/INR - ( 07 May 2020 13:33 )   PT: 14.8 sec;   INR: 1.31 ratio         PTT - ( 09 May 2020 05:30 )  PTT:71.7 sec  Lactate, Blood: 4.3 mmol/L ( @ 03:26)  Lactate, Blood: 4.4 mmol/L ( @ 19:00)  Lactate, Blood: 8.3 mmol/L ( @ 13:33)    CARDIAC MARKERS ( 07 May 2020 13:33 )  <.017 ng/mL / x     / x     / x     / x                ABG - ( 09 May 2020 08:15 )  pH, Arterial: 7.43  pH, Blood: x     /  pCO2: 37    /  pO2: 63    / HCO3: x     / Base Excess: x     /  SaO2: 92                      POCT Blood Glucose.: 178 mg/dL (09 May 2020 05:56)  POCT Blood Glucose.: 138 mg/dL (08 May 2020 23:55)  POCT Blood Glucose.: 113 mg/dL (08 May 2020 17:08)  POCT Blood Glucose.: 158 mg/dL (08 May 2020 11:41)      Urinalysis Basic - ( 07 May 2020 14:33 )    Color: Yellow / Appearance: Clear / S.020 / pH: x  Gluc: x / Ketone: Trace  / Bili: Negative / Urobili: Negative   Blood: x / Protein: 30 mg/dL / Nitrite: Negative   Leuk Esterase: Small / RBC: 5-10 /HPF / WBC 3-5 /HPF   Sq Epi: x / Non Sq Epi: Neg.-Few / Bacteria: Few /HPF        Culture - Sputum (collected 08 May 2020 12:15)  Source: .Sputum Sputum  Gram Stain (08 May 2020 18:51):    Numerous polymorphonuclear leukocytes per low power field    Rare Squamous epithelial cells per low power field    Numerous Yeast like cells per oil power field    Culture - Blood (collected 07 May 2020 19:00)  Source: .Blood Blood-Peripheral  Preliminary Report (09 May 2020 01:02):    No growth to date.    Culture - Urine (collected 07 May 2020 14:33)  Source: .Urine Catheterized  Final Report (08 May 2020 16:38):    No growth    Culture - Blood (collected 07 May 2020 13:33)  Source: .Blood Blood-Peripheral  Preliminary Report (08 May 2020 19:01):    No growth to date.        RADIOLOGY & ADDITIONAL TESTS:    Care Discussed with Consultants/Other Providers: CC: Patient is a 69y old  Male who presents with a chief complaint of hypoxic respiratory failure (08 May 2020 23:54)      S: Patient remains on non-titrateable Vasopressin with MAP >/= 65. Minimal settings on vent FiO2 30% and PEEP 5. Tolerating SBT well. No events overnight    Patient seen and examined at bedside.  off levo    ALLERGIES:  No Known Allergies      MEDICATIONS:  acetaminophen   Tablet .. 975 milliGRAM(s) Oral every 6 hours PRN  ALPRAZolam 0.125 milliGRAM(s) Oral three times a day  cefepime   IVPB 1000 milliGRAM(s) IV Intermittent daily  cefepime   IVPB      chlorhexidine 4% Liquid 1 Application(s) Topical <User Schedule>  dextrose 40% Gel 15 Gram(s) Oral once PRN  dextrose 5%. 1000 milliLiter(s) IV Continuous <Continuous>  dextrose 5%. 1000 milliLiter(s) IV Continuous <Continuous>  dextrose 50% Injectable 12.5 Gram(s) IV Push once  dextrose 50% Injectable 25 Gram(s) IV Push once  dextrose 50% Injectable 25 Gram(s) IV Push once  diVALproex Sprinkle 750 milliGRAM(s) Oral every 12 hours  famotidine    Tablet 20 milliGRAM(s) Oral daily  glucagon  Injectable 1 milliGRAM(s) IntraMuscular once PRN  heparin  Infusion.  Unit(s)/Hr IV Continuous <Continuous>  hydrocortisone sodium succinate Injectable 50 milliGRAM(s) IV Push every 6 hours  insulin lispro (HumaLOG) corrective regimen sliding scale   SubCutaneous every 6 hours  lamoTRIgine 100 milliGRAM(s) Oral two times a day  latanoprost 0.005% Ophthalmic Solution 1 Drop(s) Both EYES at bedtime  norepinephrine Infusion 0.05 MICROgram(s)/kG/Min IV Continuous <Continuous>  propofol Infusion 10 MICROgram(s)/kG/Min IV Continuous <Continuous>  vasopressin Infusion 0.04 Unit(s)/Min IV Continuous <Continuous>        Vital Signs Last 24 Hrs  T(F): 99.3 (09 May 2020 08:00), Max: 99.6 (09 May 2020 04:00)  HR: 72 (09 May 2020 09:00) (72 - 97)  BP: 104/58 (09 May 2020 09:00) (83/53 - 147/86)  RR: 19 (09 May 2020 09:00) (17 - 29)  SpO2: 100% (09 May 2020 09:00) (97% - 100%)  I&O's Summary    08 May 2020 07:  -  09 May 2020 07:00  --------------------------------------------------------  IN: 1762.5 mL / OUT: 765 mL / NET: 997.5 mL    09 May 2020 07:  -  09 May 2020 10:04  --------------------------------------------------------  IN: 178.8 mL / OUT: 60 mL / NET: 118.8 mL        PHYSICAL EXAM:  General: Cachetic   ENT: dry oral mucosa, NGT in place  Neck: LIJ TLC with dressing c/d/i. Supple, No JVD  Lungs: ETT to vent. Bilateral air entry  Cardio: RRR, S1/S2, No murmurs  Abdomen: Soft, Nontender, Nondistended; Bowel sounds present  Extremities: No cyanosis, No edema  Neuro: no new deficits  Skin: no rashes  Psych: sedated    LABS:                        11.9   15.28 )-----------( 148      ( 09 May 2020 05:30 )             37.2     05-09    149  |  115  |  88  ----------------------------<  204  4.0   |  26  |  1.21    Ca    7.6      09 May 2020 05:30  Phos  3.5     05-08  Mg     2.8     05-08    TPro  7.2  /  Alb  2.5  /  TBili  0.5  /  DBili  x   /  AST  25  /  ALT  20  /  AlkPhos  77  05-07    eGFR if Non African American: 62 mL/min/1.73M2 (20 @ 05:30)  eGFR if : 71 mL/min/1.73M2 (20 @ 05:30)    PT/INR - ( 07 May 2020 13:33 )   PT: 14.8 sec;   INR: 1.31 ratio         PTT - ( 09 May 2020 05:30 )  PTT:71.7 sec  Lactate, Blood: 4.3 mmol/L ( @ 03:26)  Lactate, Blood: 4.4 mmol/L ( @ 19:00)  Lactate, Blood: 8.3 mmol/L ( @ 13:33)    CARDIAC MARKERS ( 07 May 2020 13:33 )  <.017 ng/mL / x     / x     / x     / x        COVID  20 @ 13:33  COVID -   NotDetec      COVID Biomarkers    20 @ 13:33 ESR --  ---  CRP 16.66<H>  ---  DDimer  8630   ---   LDH --   ---   Ferritin 628<H>                ABG - ( 09 May 2020 08:15 )  pH, Arterial: 7.43  pH, Blood: x     /  pCO2: 37    /  pO2: 63    / HCO3: x     / Base Excess: x     /  SaO2: 92          POCT Blood Glucose.: 178 mg/dL (09 May 2020 05:56)  POCT Blood Glucose.: 138 mg/dL (08 May 2020 23:55)  POCT Blood Glucose.: 113 mg/dL (08 May 2020 17:08)  POCT Blood Glucose.: 158 mg/dL (08 May 2020 11:41)      Urinalysis Basic - ( 07 May 2020 14:33 )    Color: Yellow / Appearance: Clear / S.020 / pH: x  Gluc: x / Ketone: Trace  / Bili: Negative / Urobili: Negative   Blood: x / Protein: 30 mg/dL / Nitrite: Negative   Leuk Esterase: Small / RBC: 5-10 /HPF / WBC 3-5 /HPF   Sq Epi: x / Non Sq Epi: Neg.-Few / Bacteria: Few /HPF        Culture - Sputum (collected 08 May 2020 12:15)  Source: .Sputum Sputum  Gram Stain (08 May 2020 18:51):    Numerous polymorphonuclear leukocytes per low power field    Rare Squamous epithelial cells per low power field    Numerous Yeast like cells per oil power field    Culture - Blood (collected 07 May 2020 19:00)  Source: .Blood Blood-Peripheral  Preliminary Report (09 May 2020 01:02):    No growth to date.    Culture - Urine (collected 07 May 2020 14:33)  Source: .Urine Catheterized  Final Report (08 May 2020 16:38):    No growth    Culture - Blood (collected 07 May 2020 13:33)  Source: .Blood Blood-Peripheral  Preliminary Report (08 May 2020 19:01):    No growth to date.        RADIOLOGY & ADDITIONAL TESTS:    Care Discussed with Consultants/Other Providers:

## 2020-05-10 DIAGNOSIS — U07.1 COVID-19: ICD-10-CM

## 2020-05-10 DIAGNOSIS — J96.01 ACUTE RESPIRATORY FAILURE WITH HYPOXIA: ICD-10-CM

## 2020-05-10 DIAGNOSIS — S06.9X0A UNSPECIFIED INTRACRANIAL INJURY WITHOUT LOSS OF CONSCIOUSNESS, INITIAL ENCOUNTER: ICD-10-CM

## 2020-05-10 DIAGNOSIS — N17.9 ACUTE KIDNEY FAILURE, UNSPECIFIED: ICD-10-CM

## 2020-05-10 DIAGNOSIS — I82.409 ACUTE EMBOLISM AND THROMBOSIS OF UNSPECIFIED DEEP VEINS OF UNSPECIFIED LOWER EXTREMITY: ICD-10-CM

## 2020-05-10 DIAGNOSIS — Z29.9 ENCOUNTER FOR PROPHYLACTIC MEASURES, UNSPECIFIED: ICD-10-CM

## 2020-05-10 DIAGNOSIS — I10 ESSENTIAL (PRIMARY) HYPERTENSION: ICD-10-CM

## 2020-05-10 LAB
ANION GAP SERPL CALC-SCNC: 7 MMOL/L — SIGNIFICANT CHANGE UP (ref 5–17)
APTT BLD: 56.2 SEC — HIGH (ref 27.5–36.3)
APTT BLD: 63.2 SEC — HIGH (ref 27.5–36.3)
APTT BLD: 84.8 SEC — HIGH (ref 27.5–36.3)
BUN SERPL-MCNC: 45 MG/DL — HIGH (ref 7–23)
CALCIUM SERPL-MCNC: 7.7 MG/DL — LOW (ref 8.4–10.5)
CHLORIDE SERPL-SCNC: 115 MMOL/L — HIGH (ref 96–108)
CO2 SERPL-SCNC: 29 MMOL/L — SIGNIFICANT CHANGE UP (ref 22–31)
CREAT SERPL-MCNC: 0.8 MG/DL — SIGNIFICANT CHANGE UP (ref 0.5–1.3)
CULTURE RESULTS: SIGNIFICANT CHANGE UP
D DIMER BLD IA.RAPID-MCNC: 2701 NG/ML DDU — HIGH
GLUCOSE BLDC GLUCOMTR-MCNC: 107 MG/DL — HIGH (ref 70–99)
GLUCOSE BLDC GLUCOMTR-MCNC: 130 MG/DL — HIGH (ref 70–99)
GLUCOSE BLDC GLUCOMTR-MCNC: 147 MG/DL — HIGH (ref 70–99)
GLUCOSE BLDC GLUCOMTR-MCNC: 169 MG/DL — HIGH (ref 70–99)
GLUCOSE SERPL-MCNC: 169 MG/DL — HIGH (ref 70–99)
HCT VFR BLD CALC: 35.1 % — LOW (ref 39–50)
HGB BLD-MCNC: 11.3 G/DL — LOW (ref 13–17)
MAGNESIUM SERPL-MCNC: 2.8 MG/DL — HIGH (ref 1.6–2.6)
MCHC RBC-ENTMCNC: 31.3 PG — SIGNIFICANT CHANGE UP (ref 27–34)
MCHC RBC-ENTMCNC: 32.2 GM/DL — SIGNIFICANT CHANGE UP (ref 32–36)
MCV RBC AUTO: 97.2 FL — SIGNIFICANT CHANGE UP (ref 80–100)
NRBC # BLD: 0 /100 WBCS — SIGNIFICANT CHANGE UP (ref 0–0)
PHOSPHATE SERPL-MCNC: 1.9 MG/DL — LOW (ref 2.5–4.5)
PLATELET # BLD AUTO: 157 K/UL — SIGNIFICANT CHANGE UP (ref 150–400)
POTASSIUM SERPL-MCNC: 3.5 MMOL/L — SIGNIFICANT CHANGE UP (ref 3.5–5.3)
POTASSIUM SERPL-SCNC: 3.5 MMOL/L — SIGNIFICANT CHANGE UP (ref 3.5–5.3)
RBC # BLD: 3.61 M/UL — LOW (ref 4.2–5.8)
RBC # FLD: 15.5 % — HIGH (ref 10.3–14.5)
SODIUM SERPL-SCNC: 151 MMOL/L — HIGH (ref 135–145)
SPECIMEN SOURCE: SIGNIFICANT CHANGE UP
VANCOMYCIN TROUGH SERPL-MCNC: 5.8 UG/ML — LOW (ref 10–20)
WBC # BLD: 13.08 K/UL — HIGH (ref 3.8–10.5)
WBC # FLD AUTO: 13.08 K/UL — HIGH (ref 3.8–10.5)

## 2020-05-10 RX ORDER — HEPARIN SODIUM 5000 [USP'U]/ML
1300 INJECTION INTRAVENOUS; SUBCUTANEOUS
Qty: 25000 | Refills: 0 | Status: DISCONTINUED | OUTPATIENT
Start: 2020-05-10 | End: 2020-05-11

## 2020-05-10 RX ADMIN — Medication 250 MILLIGRAM(S): at 05:04

## 2020-05-10 RX ADMIN — Medication 0.12 MILLIGRAM(S): at 05:04

## 2020-05-10 RX ADMIN — FAMOTIDINE 20 MILLIGRAM(S): 10 INJECTION INTRAVENOUS at 10:49

## 2020-05-10 RX ADMIN — LAMOTRIGINE 100 MILLIGRAM(S): 25 TABLET, ORALLY DISINTEGRATING ORAL at 16:40

## 2020-05-10 RX ADMIN — Medication 50 MILLIGRAM(S): at 10:30

## 2020-05-10 RX ADMIN — Medication 50 MILLIGRAM(S): at 05:03

## 2020-05-10 RX ADMIN — DIVALPROEX SODIUM 750 MILLIGRAM(S): 500 TABLET, DELAYED RELEASE ORAL at 05:04

## 2020-05-10 RX ADMIN — DIVALPROEX SODIUM 750 MILLIGRAM(S): 500 TABLET, DELAYED RELEASE ORAL at 16:40

## 2020-05-10 RX ADMIN — HEPARIN SODIUM 1200 UNIT(S)/HR: 5000 INJECTION INTRAVENOUS; SUBCUTANEOUS at 06:22

## 2020-05-10 RX ADMIN — LAMOTRIGINE 100 MILLIGRAM(S): 25 TABLET, ORALLY DISINTEGRATING ORAL at 05:04

## 2020-05-10 RX ADMIN — Medication 2: at 05:53

## 2020-05-10 RX ADMIN — HEPARIN SODIUM 1300 UNIT(S)/HR: 5000 INJECTION INTRAVENOUS; SUBCUTANEOUS at 12:48

## 2020-05-10 RX ADMIN — Medication 0: at 23:28

## 2020-05-10 RX ADMIN — CEFEPIME 100 MILLIGRAM(S): 1 INJECTION, POWDER, FOR SOLUTION INTRAMUSCULAR; INTRAVENOUS at 10:29

## 2020-05-10 RX ADMIN — SODIUM CHLORIDE 75 MILLILITER(S): 9 INJECTION, SOLUTION INTRAVENOUS at 02:37

## 2020-05-10 RX ADMIN — Medication 0.12 MILLIGRAM(S): at 21:06

## 2020-05-10 RX ADMIN — LATANOPROST 1 DROP(S): 0.05 SOLUTION/ DROPS OPHTHALMIC; TOPICAL at 21:06

## 2020-05-10 RX ADMIN — Medication 250 MILLIGRAM(S): at 16:40

## 2020-05-10 RX ADMIN — CHLORHEXIDINE GLUCONATE 1 APPLICATION(S): 213 SOLUTION TOPICAL at 05:06

## 2020-05-10 NOTE — PROGRESS NOTE ADULT - SUBJECTIVE AND OBJECTIVE BOX
Awake, says "no" when asked if anything is bothering him.  Remains on bedrest, BM documented yesterday.    Vital Signs Last 24 Hrs  T(C): 36.3 (10 May 2020 09:00), Max: 37.2 (09 May 2020 11:00)  T(F): 97.4 (10 May 2020 09:00), Max: 98.9 (09 May 2020 11:00)  HR: 66 (10 May 2020 09:00) (61 - 74)  BP: 127/67 (10 May 2020 09:00) (87/50 - 127/67)  BP(mean): 80 (10 May 2020 09:00) (59 - 80)  RR: 13 (10 May 2020 09:00) (13 - 23)  SpO2: 99% on VM (May 2020 09:00) (97% - 100%)    Ying with clear yellow drainage    Labs                        11.3   13.08 )-----------( 157      ( 10 May 2020 05:05 )             35.1     151<H>  |  115<H>  |  45<H>  ----------------------------<  169<H>  3.5   |  29  |  0.80    Ca    7.7<L>      10 May 2020 05:05  Phos  1.9     05-10  Mg     2.8     05-10    Radiology  EXAM:  US DPLX LWR EXT VEINS COMPL BI      PROCEDURE DATE:  05/08/2020    INTERPRETATION:  Bilateral lower extremity venous Doppl  History respiratory failure, viral pneumonia    There is normal compressive phasic venous color flow in both legs from the groin to the popliteal on either side. There is loss of compressibility and color flow of the right peroneal vein in the calf. There is no similar abnormality on the left.    IMPRESSION:    Small right calf DVT. Normal from the knee the groin on either side.    TRES NINA M.D., ATTENDING RADIOLOGIST  This document has been electronically signed. May  8 2020  7:04PM    MEDICATIONS  (STANDING):  ALPRAZolam 0.125 milliGRAM(s) Oral three times a day  cefepime   IVPB 1000 milliGRAM(s) IV Intermittent daily  chlorhexidine 4% Liquid 1 Application(s) Topical <User Schedule>  diVALproex Sprinkle 750 milliGRAM(s) Oral every 12 hours  famotidine    Tablet 20 milliGRAM(s) Oral daily  heparin  Infusion.  Unit(s)/Hr (12 mL/Hr) IV Continuous <Continuous>  hydrocortisone sodium succinate Injectable 50 milliGRAM(s) IV Push every 6 hours  insulin lispro (HumaLOG) corrective regimen sliding scale   SubCutaneous every 6 hours  lamoTRIgine 100 milliGRAM(s) Oral two times a day  latanoprost 0.005% Ophthalmic Solution 1 Drop(s) Both EYES at bedtime  vancomycin  IVPB 750 milliGRAM(s) IV Intermittent every 12 hours  acetaminophen   Tablet .. 975 milliGRAM(s) Oral every 6 hours PRN Temp greater or equal to 38C (100.4F)    Physical Exam  Gen:  Elderly appearing male resting in bed, NAD  ENT:  NC/AT, no JVD noted.  LIJ TLC with dressing C/D/I, NGT secured to nare  Thorax:  Symmetric, no retractions  Lung:  Coarse rhonchi throughout  CV:  S1, S2. RRR  Abd:  Soft, NT/ND.  BS normoactive, no masses to palp  Extrem:  No C/C/E, DP/radial pulses +2  Neuro:  No gross motor/sensory deficits Awake, says "no" when asked if anything is bothering him.  Remains on bedrest, BM documented yesterday.    Vital Signs Last 24 Hrs  T(C): 36.3 (10 May 2020 09:00), Max: 37.2 (09 May 2020 11:00)  T(F): 97.4 (10 May 2020 09:00), Max: 98.9 (09 May 2020 11:00)  HR: 66 (10 May 2020 09:00) (61 - 74)  BP: 127/67 (10 May 2020 09:00) (87/50 - 127/67)  BP(mean): 80 (10 May 2020 09:00) (59 - 80)  RR: 13 (10 May 2020 09:00) (13 - 23)  SpO2: 99% on VM (May 2020 09:00) (97% - 100%)    Ying with clear yellow drainage    Labs                        11.3   13.08 )-----------( 157      ( 10 May 2020 05:05 )             35.1     151<H>  |  115<H>  |  45<H>  ----------------------------<  169<H>  3.5   |  29  |  0.80    Ca    7.7<L>      10 May 2020 05:05  Phos  1.9     05-10  Mg     2.8     05-10    COVID  05-09-20 @ 10:50  COVID -   NotDetec  05-07-20 @ 13:33  COVID    NotDete      COVID Biomarkers    05-10-20 @ 05:05 ESR --  ---  CRP --  ---  DDimer  2701<H>   ---   LDH --   ---   Ferritin --    05-07-20 @ 13:33 ESR --  ---  CRP 16.66<H>  ---  DDimer  8630   ---   LDH --   ---   Ferritin 628<H>          Radiology  EXAM:  US DPLX LWR EXT VEINS COMPL BI      PROCEDURE DATE:  05/08/2020    INTERPRETATION:  Bilateral lower extremity venous Doppl  History respiratory failure, viral pneumonia    There is normal compressive phasic venous color flow in both legs from the groin to the popliteal on either side. There is loss of compressibility and color flow of the right peroneal vein in the calf. There is no similar abnormality on the left.    IMPRESSION:    Small right calf DVT. Normal from the knee the groin on either side.    TRES NINA M.D., ATTENDING RADIOLOGIST  This document has been electronically signed. May  8 2020  7:04PM    MEDICATIONS  (STANDING):  ALPRAZolam 0.125 milliGRAM(s) Oral three times a day  cefepime   IVPB 1000 milliGRAM(s) IV Intermittent daily  chlorhexidine 4% Liquid 1 Application(s) Topical <User Schedule>  diVALproex Sprinkle 750 milliGRAM(s) Oral every 12 hours  famotidine    Tablet 20 milliGRAM(s) Oral daily  heparin  Infusion.  Unit(s)/Hr (12 mL/Hr) IV Continuous <Continuous>  hydrocortisone sodium succinate Injectable 50 milliGRAM(s) IV Push every 6 hours  insulin lispro (HumaLOG) corrective regimen sliding scale   SubCutaneous every 6 hours  lamoTRIgine 100 milliGRAM(s) Oral two times a day  latanoprost 0.005% Ophthalmic Solution 1 Drop(s) Both EYES at bedtime  vancomycin  IVPB 750 milliGRAM(s) IV Intermittent every 12 hours  acetaminophen   Tablet .. 975 milliGRAM(s) Oral every 6 hours PRN Temp greater or equal to 38C (100.4F)    Physical Exam  Gen:  Elderly appearing male resting in bed, NAD  ENT:  NC/AT, no JVD noted.  LIJ TLC with dressing C/D/I, NGT secured to nare  Thorax:  Symmetric, no retractions  Lung:  Coarse rhonchi throughout  CV:  S1, S2. RRR  Abd:  Soft, NT/ND.  BS normoactive, no masses to palp  Extrem:  No C/C/E, DP/radial pulses +2  Neuro:  No gross motor/sensory deficits

## 2020-05-10 NOTE — PROGRESS NOTE ADULT - PROBLEM SELECTOR PLAN 1
Initially requiring intubation, now extubated.  Continue antibiotics for suspected right HAP, wean oxygen as tolerated.  Monitor o2 saturation, fever curve and leukocytosis

## 2020-05-10 NOTE — CHART NOTE - NSCHARTNOTEFT_GEN_A_CORE
Nutrition Follow Up Note  Hospital Course (Per Electronic Medical Record): 69 year old male with PMH bipolar disorder, TBI with dementia, HTN, COVID-19, acute LE DVT with acute hypoxic respiratory failure with shock secondary to HAP likely complicated by acute PE. Pt required intubation and mechanical vent support. patient was successfully extubated yesterday, remains on oxygen.    Source: Medical Record [X] Patient [ ] Family [ ]         Diet: NPO    Enteral/Parenteral Nutrition: Recommend initiating EN until pt assessed by SLP. Suggest Nepro w/ CarbSteady @ 25 ml/hr increase as tolerating to meet goal of 50 ml/hr x 24 hrs (will provide 2160 kcal, 97 g protein) via NGT    Current Weight: 149 lbs (5-10)    Pertinent Medications: MEDICATIONS  (STANDING):  ALPRAZolam 0.125 milliGRAM(s) Oral three times a day  cefepime   IVPB 1000 milliGRAM(s) IV Intermittent daily  cefepime   IVPB      chlorhexidine 4% Liquid 1 Application(s) Topical <User Schedule>  dextrose 5%. 1000 milliLiter(s) (75 mL/Hr) IV Continuous <Continuous>  dextrose 5%. 1000 milliLiter(s) (50 mL/Hr) IV Continuous <Continuous>  dextrose 50% Injectable 12.5 Gram(s) IV Push once  dextrose 50% Injectable 25 Gram(s) IV Push once  dextrose 50% Injectable 25 Gram(s) IV Push once  diVALproex Sprinkle 750 milliGRAM(s) Oral every 12 hours  famotidine    Tablet 20 milliGRAM(s) Oral daily  heparin  Infusion.  Unit(s)/Hr (12 mL/Hr) IV Continuous <Continuous>  hydrocortisone sodium succinate Injectable 50 milliGRAM(s) IV Push every 6 hours  insulin lispro (HumaLOG) corrective regimen sliding scale   SubCutaneous every 6 hours  lamoTRIgine 100 milliGRAM(s) Oral two times a day  latanoprost 0.005% Ophthalmic Solution 1 Drop(s) Both EYES at bedtime  vancomycin  IVPB 750 milliGRAM(s) IV Intermittent every 12 hours    MEDICATIONS  (PRN):  acetaminophen   Tablet .. 975 milliGRAM(s) Oral every 6 hours PRN Temp greater or equal to 38C (100.4F)  dextrose 40% Gel 15 Gram(s) Oral once PRN Blood Glucose LESS THAN 70 milliGRAM(s)/deciliter      Pertinent Labs:  05-10 Na151 mmol/L<H> Glu 169 mg/dL<H> K+ 3.5 mmol/L Cr  0.80 mg/dL BUN 45 mg/dL<H> 05-10 Phos 1.9 mg/dL<L> 05-07 Alb 2.5 g/dL<L>        Skin: coccyx- suspected DTI, L buttock- suspected DTI    Edema: none per flow sheets    Last BM: on 5-9-20    Estimated Needs:   [X] No Change since Previous Assessment  [ ] Recalculated:     Previous Nutrition Diagnosis:   severe malnutrition    Nutrition Diagnosis is [X] Ongoing    [ ] Resolved   [ ] Not Applicable      New Nutrition Diagnosis: [X] Not Applicable  [ ] Inadequate Protein Energy Intake   [ ] Inadequate Oral Intake   [ ] Excessive Energy Intake   [ ] Increased Nutrient Needs   [ ] Obesity   [ ] Altered GI Function   [ ] Unintended Weight Loss   [ ] Food & Nutrition Related Knowledge Deficit  [ ] Limited Adherence to nutrition related recommendations   [ ] Malnutrition      Interventions:   1. Recommend enteral nutrition until pt able to be assessed by SLP  2. Initiate EN of Nepro w/ CarbSteady @ 25 ml/hr advanced as tolerated to goal rate of 50 ml/hr    Monitoring & Evaluation:   [X] Weights   [X] PO Intake   [X] Follow Up (Per Protocol)  [X] Tolerance to Diet Prescription   [X] Other: Labs & PCOT    RD Remains Available.  Belia Morales RD

## 2020-05-10 NOTE — PROGRESS NOTE ADULT - PROBLEM SELECTOR PLAN 2
Right calf, continue full dose anticoagulation with heparin, monitor pTT, no SCDs. Right calf, continue full dose anticoagulation with heparin, monitor pTT, no SCDs.  ? PE as cause of shock, no evidence on echo.  CTA will not , will continue a/c at this time.

## 2020-05-10 NOTE — PROGRESS NOTE ADULT - PROBLEM SELECTOR PLAN 7
Full dose AC with Heparin infusion  Pepcid for GI daily, increase to BID if renal function remains stable  Speech and swallow eval before starting PO  DNR

## 2020-05-10 NOTE — PROGRESS NOTE ADULT - ASSESSMENT
69 year old male with PMH bipolar disorder, TBI with dementia, HTN, COVID-19, acute LE DVT with acute hypoxic respiratory failure with shock secondary to HAP likely complicated by acute PE

## 2020-05-10 NOTE — PROGRESS NOTE ADULT - PROBLEM SELECTOR PLAN 3
With persistent hypernatremia, creatinine now normalized.  Continue IV hydration, monitor electrolytes, strict I&O's with Deonna

## 2020-05-11 LAB
ANION GAP SERPL CALC-SCNC: 6 MMOL/L — SIGNIFICANT CHANGE UP (ref 5–17)
APTT BLD: 32.4 SEC — SIGNIFICANT CHANGE UP (ref 27.5–36.3)
APTT BLD: 34 SEC — SIGNIFICANT CHANGE UP (ref 27.5–36.3)
APTT BLD: 56.6 SEC — HIGH (ref 27.5–36.3)
APTT BLD: 75.6 SEC — HIGH (ref 27.5–36.3)
BUN SERPL-MCNC: 28 MG/DL — HIGH (ref 7–23)
CALCIUM SERPL-MCNC: 7.7 MG/DL — LOW (ref 8.4–10.5)
CHLORIDE SERPL-SCNC: 109 MMOL/L — HIGH (ref 96–108)
CO2 SERPL-SCNC: 29 MMOL/L — SIGNIFICANT CHANGE UP (ref 22–31)
CREAT SERPL-MCNC: 0.7 MG/DL — SIGNIFICANT CHANGE UP (ref 0.5–1.3)
GLUCOSE BLDC GLUCOMTR-MCNC: 125 MG/DL — HIGH (ref 70–99)
GLUCOSE BLDC GLUCOMTR-MCNC: 158 MG/DL — HIGH (ref 70–99)
GLUCOSE BLDC GLUCOMTR-MCNC: 91 MG/DL — SIGNIFICANT CHANGE UP (ref 70–99)
GLUCOSE BLDC GLUCOMTR-MCNC: 92 MG/DL — SIGNIFICANT CHANGE UP (ref 70–99)
GLUCOSE SERPL-MCNC: 177 MG/DL — HIGH (ref 70–99)
HCT VFR BLD CALC: 35.1 % — LOW (ref 39–50)
HCT VFR BLD CALC: 36.4 % — LOW (ref 39–50)
HGB BLD-MCNC: 11.6 G/DL — LOW (ref 13–17)
HGB BLD-MCNC: 11.9 G/DL — LOW (ref 13–17)
MAGNESIUM SERPL-MCNC: 2.4 MG/DL — SIGNIFICANT CHANGE UP (ref 1.6–2.6)
MCHC RBC-ENTMCNC: 31.3 PG — SIGNIFICANT CHANGE UP (ref 27–34)
MCHC RBC-ENTMCNC: 31.4 PG — SIGNIFICANT CHANGE UP (ref 27–34)
MCHC RBC-ENTMCNC: 32.7 GM/DL — SIGNIFICANT CHANGE UP (ref 32–36)
MCHC RBC-ENTMCNC: 33 GM/DL — SIGNIFICANT CHANGE UP (ref 32–36)
MCV RBC AUTO: 95.1 FL — SIGNIFICANT CHANGE UP (ref 80–100)
MCV RBC AUTO: 95.8 FL — SIGNIFICANT CHANGE UP (ref 80–100)
NRBC # BLD: 0 /100 WBCS — SIGNIFICANT CHANGE UP (ref 0–0)
NRBC # BLD: 1 /100 WBCS — HIGH (ref 0–0)
PHOSPHATE SERPL-MCNC: 1.4 MG/DL — LOW (ref 2.5–4.5)
PLATELET # BLD AUTO: 162 K/UL — SIGNIFICANT CHANGE UP (ref 150–400)
PLATELET # BLD AUTO: 165 K/UL — SIGNIFICANT CHANGE UP (ref 150–400)
POTASSIUM SERPL-MCNC: 3.4 MMOL/L — LOW (ref 3.5–5.3)
POTASSIUM SERPL-SCNC: 3.4 MMOL/L — LOW (ref 3.5–5.3)
RBC # BLD: 3.69 M/UL — LOW (ref 4.2–5.8)
RBC # BLD: 3.8 M/UL — LOW (ref 4.2–5.8)
RBC # FLD: 15.1 % — HIGH (ref 10.3–14.5)
RBC # FLD: 15.2 % — HIGH (ref 10.3–14.5)
SODIUM SERPL-SCNC: 144 MMOL/L — SIGNIFICANT CHANGE UP (ref 135–145)
WBC # BLD: 13.7 K/UL — HIGH (ref 3.8–10.5)
WBC # BLD: 14.23 K/UL — HIGH (ref 3.8–10.5)
WBC # FLD AUTO: 13.7 K/UL — HIGH (ref 3.8–10.5)
WBC # FLD AUTO: 14.23 K/UL — HIGH (ref 3.8–10.5)

## 2020-05-11 PROCEDURE — 99233 SBSQ HOSP IP/OBS HIGH 50: CPT

## 2020-05-11 RX ORDER — POTASSIUM CHLORIDE 20 MEQ
40 PACKET (EA) ORAL EVERY 4 HOURS
Refills: 0 | Status: DISCONTINUED | OUTPATIENT
Start: 2020-05-11 | End: 2020-05-11

## 2020-05-11 RX ORDER — HEPARIN SODIUM 5000 [USP'U]/ML
1400 INJECTION INTRAVENOUS; SUBCUTANEOUS
Qty: 25000 | Refills: 0 | Status: DISCONTINUED | OUTPATIENT
Start: 2020-05-11 | End: 2020-05-11

## 2020-05-11 RX ORDER — POTASSIUM CHLORIDE 20 MEQ
40 PACKET (EA) ORAL ONCE
Refills: 0 | Status: COMPLETED | OUTPATIENT
Start: 2020-05-11 | End: 2020-05-11

## 2020-05-11 RX ORDER — HEPARIN SODIUM 5000 [USP'U]/ML
1800 INJECTION INTRAVENOUS; SUBCUTANEOUS
Qty: 25000 | Refills: 0 | Status: DISCONTINUED | OUTPATIENT
Start: 2020-05-11 | End: 2020-05-12

## 2020-05-11 RX ORDER — POTASSIUM PHOSPHATE, MONOBASIC POTASSIUM PHOSPHATE, DIBASIC 236; 224 MG/ML; MG/ML
15 INJECTION, SOLUTION INTRAVENOUS ONCE
Refills: 0 | Status: DISCONTINUED | OUTPATIENT
Start: 2020-05-11 | End: 2020-05-11

## 2020-05-11 RX ORDER — POTASSIUM PHOSPHATE, MONOBASIC POTASSIUM PHOSPHATE, DIBASIC 236; 224 MG/ML; MG/ML
15 INJECTION, SOLUTION INTRAVENOUS ONCE
Refills: 0 | Status: COMPLETED | OUTPATIENT
Start: 2020-05-11 | End: 2020-05-11

## 2020-05-11 RX ORDER — HEPARIN SODIUM 5000 [USP'U]/ML
2500 INJECTION INTRAVENOUS; SUBCUTANEOUS ONCE
Refills: 0 | Status: COMPLETED | OUTPATIENT
Start: 2020-05-11 | End: 2020-05-11

## 2020-05-11 RX ORDER — CEFEPIME 1 G/1
2000 INJECTION, POWDER, FOR SOLUTION INTRAMUSCULAR; INTRAVENOUS
Refills: 0 | Status: DISCONTINUED | OUTPATIENT
Start: 2020-05-11 | End: 2020-05-14

## 2020-05-11 RX ADMIN — HEPARIN SODIUM 2500 UNIT(S): 5000 INJECTION INTRAVENOUS; SUBCUTANEOUS at 02:49

## 2020-05-11 RX ADMIN — DIVALPROEX SODIUM 750 MILLIGRAM(S): 500 TABLET, DELAYED RELEASE ORAL at 05:43

## 2020-05-11 RX ADMIN — SODIUM CHLORIDE 75 MILLILITER(S): 9 INJECTION, SOLUTION INTRAVENOUS at 22:42

## 2020-05-11 RX ADMIN — LATANOPROST 1 DROP(S): 0.05 SOLUTION/ DROPS OPHTHALMIC; TOPICAL at 22:43

## 2020-05-11 RX ADMIN — SODIUM CHLORIDE 75 MILLILITER(S): 9 INJECTION, SOLUTION INTRAVENOUS at 05:48

## 2020-05-11 RX ADMIN — LAMOTRIGINE 100 MILLIGRAM(S): 25 TABLET, ORALLY DISINTEGRATING ORAL at 16:57

## 2020-05-11 RX ADMIN — Medication 40 MILLIEQUIVALENT(S): at 13:27

## 2020-05-11 RX ADMIN — Medication 250 MILLIGRAM(S): at 16:58

## 2020-05-11 RX ADMIN — HEPARIN SODIUM 1400 UNIT(S)/HR: 5000 INJECTION INTRAVENOUS; SUBCUTANEOUS at 12:35

## 2020-05-11 RX ADMIN — Medication 0.12 MILLIGRAM(S): at 05:40

## 2020-05-11 RX ADMIN — DIVALPROEX SODIUM 750 MILLIGRAM(S): 500 TABLET, DELAYED RELEASE ORAL at 16:57

## 2020-05-11 RX ADMIN — Medication 2: at 05:44

## 2020-05-11 RX ADMIN — POTASSIUM PHOSPHATE, MONOBASIC POTASSIUM PHOSPHATE, DIBASIC 62.5 MILLIMOLE(S): 236; 224 INJECTION, SOLUTION INTRAVENOUS at 08:45

## 2020-05-11 RX ADMIN — CHLORHEXIDINE GLUCONATE 1 APPLICATION(S): 213 SOLUTION TOPICAL at 05:41

## 2020-05-11 RX ADMIN — Medication 250 MILLIGRAM(S): at 05:43

## 2020-05-11 RX ADMIN — Medication 0.12 MILLIGRAM(S): at 13:24

## 2020-05-11 RX ADMIN — Medication 0.12 MILLIGRAM(S): at 22:42

## 2020-05-11 RX ADMIN — FAMOTIDINE 20 MILLIGRAM(S): 10 INJECTION INTRAVENOUS at 13:24

## 2020-05-11 RX ADMIN — LAMOTRIGINE 100 MILLIGRAM(S): 25 TABLET, ORALLY DISINTEGRATING ORAL at 05:42

## 2020-05-11 RX ADMIN — CEFEPIME 100 MILLIGRAM(S): 1 INJECTION, POWDER, FOR SOLUTION INTRAMUSCULAR; INTRAVENOUS at 10:30

## 2020-05-11 RX ADMIN — HEPARIN SODIUM 1300 UNIT(S)/HR: 5000 INJECTION INTRAVENOUS; SUBCUTANEOUS at 01:41

## 2020-05-11 RX ADMIN — CEFEPIME 100 MILLIGRAM(S): 1 INJECTION, POWDER, FOR SOLUTION INTRAMUSCULAR; INTRAVENOUS at 22:42

## 2020-05-11 NOTE — SWALLOW BEDSIDE ASSESSMENT ADULT - COMMENTS
WBC 14.23  CXR 5/8/20: NG tube noted, left side patchy opacities noted.  Pt received at the bedside lethargic, alert upon arousal.  Pt on O2 nasal cannula.

## 2020-05-11 NOTE — PROGRESS NOTE ADULT - ASSESSMENT
A/P 68 yo M with PMHx of HTN, TBI, NPH with  shunt presented from NH for eval of hypoxia. Of note patient known to be COVID 19 positive 3 weeks ago. Per facility recent testing returned negative for COVID 19 infection.  Secondary to poor baseline mental status, increased work of breathing and hypoxia patient intubated emergent in the ED on 5/7. Now extubated on 5/9 in CCU     Hypoxic respiratory failure requiring intubation with Right sided pneumonia unclear if related to COVID 19 vs Suspected PE  2. Septic/hypovolumic shock Resolved  3. Acute renal failure with Hypernatremia with lactic acidosis - Resolved  4. Metabolic encephalopathy ? now back to baseline  5. PMHx of HTN, Bipolar disorder, Glaucoma, TBI, NPH with  shunt  6. Elevated D-Dimer- high suspicion for VTE - Right Calf DVT on US    PLAN:  - continue current care,   - S&S f/u to advance diet  - NGT feeding  - continue IVF, Continue IV abx  - cvp/lita removed  -  palliative care f/u  -  transfer to medicine today    [facility] DNR .     Palliative :  for goc f/u, pt in ccu but was extubated on 5/9, chart reviewed. Today, Pt remains  weakened and lethargic. Had  speech consult for swallow eval, recs  keep NPO at this time.   Pt has NG tube in place for feeds.  I called pts HCP cousin Anel Rg today # [phone].  No answer, left VM  asking for return call .  Plan is to cont goc discussion to address intubation status and tube feeds . Recs pending clinical course. A/P 70 yo M with PMHx of HTN, TBI, NPH with  shunt presented from NH for eval of hypoxia. Of note patient known to be COVID 19 positive 3 weeks ago. Per facility recent testing returned negative for COVID 19 infection.  Secondary to poor baseline mental status, increased work of breathing and hypoxia patient intubated emergent in the ED on 5/7. Now extubated on 5/9 in CCU     Hypoxic respiratory failure requiring intubation with Right sided pneumonia unclear if related to COVID 19 vs Suspected PE  2. Septic/hypovolumic shock Resolved  3. Acute renal failure with Hypernatremia with lactic acidosis - Resolved  4. Metabolic encephalopathy ? now back to baseline  5. PMHx of HTN, Bipolar disorder, Glaucoma, TBI, NPH with  shunt  6. Elevated D-Dimer- high suspicion for VTE - Right Calf DVT on US    PLAN:  - continue current care,   - S&S f/u to advance diet  - NGT feeding  - continue IVF, Continue IV abx  - cvp/lita removed  -  palliative care f/u  -  transfer to medicine today    [facility] DNR .     Palliative :  for goc f/u, pt in ccu but was extubated on 5/9, chart reviewed. Today, Pt remains  weakened and lethargic. Had  speech consult for swallow eval, recs  keep NPO at this time.   Pt has NG tube in place for feeds.  I called pts HCP cousin Anel Ifrah today # [phone].  No answer, left VM  asking for return call .  Plan is to cont goc discussion to address intubation status and tube feeds . Recs pending clinical course.       ADD: Anel returned call, we reviewed pts current condition , she was also updated by ccu team.  At this point Anel  says she would want intubation again if he needed.  We then discussed having a Face Time session tomorrow so she can see him, she has not seen him in weeks,  she is aware he is still lethargic today. Plan is to touch base tomorrow and  have Face Time with patient .

## 2020-05-11 NOTE — PROGRESS NOTE ADULT - ASSESSMENT
69M with bipolar disorder, hx TBI, HTN, COVID19, acute lower extremity DVT, returns to hospital with acute hypoxic respiratory failure in setting of septic shock due to complex pneumonia (HAP) and suspected PE    #Acute hypoxic respiratory failure secondary to  #Hospital acquired complex pneumonia as well as   #Possible PE (not confirmed, cannot state definitively management will not change as patient already on therapeutic a/c)  -c/w O2 support  -c/w antibiotics day #5 today  -s/p intubation, now breathing status is stable  -c/w full dose a/c, on heparin drip    #Right calf DVT (small)  -heparin drip while npo  -pending speech for follow-up re: advancing diet    #ALLYSON: RESOLVED  -Resolved with IVF    #Acute metabolic encephalopathy due to  #hypernatremia and  #Hypophosphatemia  -Replete PO  -Sodium now WNL  -Speech follow-up to advance diet    #COVID19 by history: Now with two neg swabs, not currently an active issue    #Normal pressure hydrocephalus:  shunt in place    #Bipolar disorder: c/w Lamictal and Depakote     ***D/c Ying tonight, TOV    #DVT ppx: Heparin drip

## 2020-05-11 NOTE — CHART NOTE - NSCHARTNOTEFT_GEN_A_CORE
PTT 34, current rate heparin 14U/kg/hr. Patient on full dose anticoagulation.    Increasing rate to 18U/kg/hr. Repeat PTT in 6 hours

## 2020-05-11 NOTE — SWALLOW BEDSIDE ASSESSMENT ADULT - ASR SWALLOW ASPIRATION MONITOR
cough/gurgly voice/upper respiratory infection/change of breathing pattern/fever/pneumonia/throat clearing

## 2020-05-11 NOTE — PROGRESS NOTE ADULT - SUBJECTIVE AND OBJECTIVE BOX
Progress: extubated 5/9, remains very lethargic today     Present Symptoms:   Dyspnea: mild  Nausea/Vomiting:   Anxiety:    Depressed Mood:   Fatigue: yes  Loss of appetite:   Pain:                   location:   Review of Systems: [ Unable to obtain due to poor mentation]    MEDICATIONS  (STANDING):  ALPRAZolam 0.125 milliGRAM(s) Oral three times a day  cefepime   IVPB 2000 milliGRAM(s) IV Intermittent <User Schedule>  chlorhexidine 4% Liquid 1 Application(s) Topical <User Schedule>  dextrose 5%. 1000 milliLiter(s) (75 mL/Hr) IV Continuous <Continuous>  dextrose 5%. 1000 milliLiter(s) (50 mL/Hr) IV Continuous <Continuous>  dextrose 50% Injectable 12.5 Gram(s) IV Push once  dextrose 50% Injectable 25 Gram(s) IV Push once  dextrose 50% Injectable 25 Gram(s) IV Push once  diVALproex Sprinkle 750 milliGRAM(s) Oral every 12 hours  famotidine    Tablet 20 milliGRAM(s) Oral daily  heparin  Infusion. 1400 Unit(s)/Hr (14 mL/Hr) IV Continuous <Continuous>  insulin lispro (HumaLOG) corrective regimen sliding scale   SubCutaneous every 6 hours  lamoTRIgine 100 milliGRAM(s) Oral two times a day  latanoprost 0.005% Ophthalmic Solution 1 Drop(s) Both EYES at bedtime  vancomycin  IVPB 750 milliGRAM(s) IV Intermittent every 12 hours    MEDICATIONS  (PRN):  acetaminophen   Tablet .. 975 milliGRAM(s) Oral every 6 hours PRN Temp greater or equal to 38C (100.4F)  dextrose 40% Gel 15 Gram(s) Oral once PRN Blood Glucose LESS THAN 70 milliGRAM(s)/deciliter      PHYSICAL EXAM:  Vital Signs Last 24 Hrs  T(C): 37 (11 May 2020 10:00), Max: 37.2 (11 May 2020 05:00)  T(F): 98.6 (11 May 2020 10:00), Max: 99 (11 May 2020 05:00)  HR: 73 (11 May 2020 15:24) (64 - 81)  BP: 110/65 (11 May 2020 15:24) (97/58 - 133/66)  BP(mean): 76 (10 May 2020 16:52) (76 - 76)  RR: 16 (11 May 2020 15:24) (12 - 20)  SpO2: 98% (11 May 2020 15:24) (94% - 100%)  General: lethargic  HEENT: normal,        Diet: NPO,  NG feeds    LABS:                          11.6   14.23 )-----------( 162      ( 11 May 2020 05:30 )             35.1     05-11    144  |  109<H>  |  28<H>  ----------------------------<  177<H>  3.4<L>   |  29  |  0.70    Ca    7.7<L>      11 May 2020 05:30  Phos  1.4     05-11  Mg     2.4     05-11          RADIOLOGY & ADDITIONAL STUDIES: < from: Xray Chest 1 View- PORTABLE-Routine (05.08.20 @ 10:38) >    EXAM:  XR CHEST PORTABLE ROUTINE 1V      PROCEDURE DATE:  05/08/2020        INTERPRETATION:    Single AP view of the chest    Comparison:05/08/2020    Clinical Indication:intubated    FINDINGS/  IMPRESSION:  There is an endotracheal tube with the tip seen two vertebral bodies above the xochilt. There is an NG tube seen with the tip in the stomach. There is a left internal jugular central line with the tip in the SVC. There is a right internal jugular central line with the tip in the region of the SVC. Faint patchy opacities with patchy density in the left hilar region. Left costophrenic angle is excluded from the study.    DISCRETE X-RAY DATA:  Percent of LEFT lung opacification: 1-33%  Percent of RIGHT lung opacification: 1-33%  Change in lung opacification from most recent x-ray (<=3 days): Stable  Change from prior dated 3 or more days (same admission): No Prior  < from: US Duplex Venous Lower Ext Complete, Bilateral (05.08.20 @ 18:56) >  EXAM:  US DPLX LWR EXT VEINS COMPL BI      PROCEDURE DATE:  05/08/2020        INTERPRETATION:  Bilateral lower extremity venous Doppler    History respiratory failure, viral pneumonia    There is normal compressive phasic venous color flow in both legs from the groin to the popliteal on either side. There is loss of compressibility and color flow of the right peroneal vein in the calf. There is no similar abnormality on the left.    IMPRESSION:    Small right calf DVT. Normal from the knee the groin on either side.              ADVANCE DIRECTIVES: MOLST  DNR    Advanced Care Planning discussion total time spent:

## 2020-05-11 NOTE — SWALLOW BEDSIDE ASSESSMENT ADULT - SLP PERTINENT HISTORY OF CURRENT PROBLEM
70 yo M with PMHx of HTN, TBI, NPH with  shunt presented from NH for eval of hypoxia. Of note patient known to be COVID 19 positive 3 weeks ago. Per facility recent testing returned negative for COVID 19 infection. Patient today developed SOB prompting transfer to ED for eval. Upon arrival patient found to be hypoxic on NRB. Patient AOx1 and unable to provide history. Patient does have established HCP who was consult and patient deemed full CODE. Secondary to poor baseline mental status, increased work of breathing and hypoxia patient intubated emergent in the ED.   Pt extubated 5/10/20.

## 2020-05-11 NOTE — PROGRESS NOTE ADULT - SUBJECTIVE AND OBJECTIVE BOX
Follow-up Critical Care Progress Note  Chief Complaint : Acute respiratory failure with hypoxia      pt alert, minimally responsive  suspect baseline mental status      Allergies :No Known Allergies      PAST MEDICAL & SURGICAL HISTORY:  Bipolar disorder  UTI (urinary tract infection)  Dysphonia  Metabolic encephalopathy  COVID-19  Altered mental status  Traumatic brain injury, without loss of consciousness, initial encounter  Hypertension  Personal history of spine surgery      Medications:  MEDICATIONS  (STANDING):  ALPRAZolam 0.125 milliGRAM(s) Oral three times a day  cefepime   IVPB 2000 milliGRAM(s) IV Intermittent <User Schedule>  chlorhexidine 4% Liquid 1 Application(s) Topical <User Schedule>  dextrose 5%. 1000 milliLiter(s) (75 mL/Hr) IV Continuous <Continuous>  dextrose 5%. 1000 milliLiter(s) (50 mL/Hr) IV Continuous <Continuous>  dextrose 50% Injectable 12.5 Gram(s) IV Push once  dextrose 50% Injectable 25 Gram(s) IV Push once  dextrose 50% Injectable 25 Gram(s) IV Push once  diVALproex Sprinkle 750 milliGRAM(s) Oral every 12 hours  famotidine    Tablet 20 milliGRAM(s) Oral daily  heparin  Infusion. 1400 Unit(s)/Hr (14 mL/Hr) IV Continuous <Continuous>  insulin lispro (HumaLOG) corrective regimen sliding scale   SubCutaneous every 6 hours  lamoTRIgine 100 milliGRAM(s) Oral two times a day  latanoprost 0.005% Ophthalmic Solution 1 Drop(s) Both EYES at bedtime  potassium phosphate IVPB 15 milliMole(s) IV Intermittent once  vancomycin  IVPB 750 milliGRAM(s) IV Intermittent every 12 hours    MEDICATIONS  (PRN):  acetaminophen   Tablet .. 975 milliGRAM(s) Oral every 6 hours PRN Temp greater or equal to 38C (100.4F)  dextrose 40% Gel 15 Gram(s) Oral once PRN Blood Glucose LESS THAN 70 milliGRAM(s)/deciliter      LABS:                        11.6   14.23 )-----------( 162      ( 11 May 2020 05:30 )             35.1     05-11    144  |  109<H>  |  28<H>  ----------------------------<  177<H>  3.4<L>   |  29  |  0.70    Ca    7.7<L>      11 May 2020 05:30  Phos  1.4     05-11  Mg     2.4     05-11      COVID  05-09-20 @ 10:50  COVID -   NotDetec  05-07-20 @ 13:33  COVID -   NotDetec      COVID Biomarkers    05-10-20 @ 05:05 ESR --  ---  CRP --  ---  DDimer  2701<H>   ---   LDH --   ---   Ferritin --    05-07-20 @ 13:33 ESR --  ---  CRP 16.66<H>  ---  DDimer  8630   ---   LDH --   ---   Ferritin 628<H>          WBC Trend  05-11-20 @ 05:30   -  14.23<H>  05-10-20 @ 05:05   -  13.08<H>  05-09-20 @ 05:30   -  15.28<H>    H/H Trend  05-11-20 @ 05:30   -  11.6<L> / 35.1<L>  05-10-20 @ 05:05   -  11.3<L> / 35.1<L>  05-09-20 @ 05:30   -  11.9<L> / 37.2<L>    Platelet Trend  05-11-20 @ 05:30   -  162  05-10-20 @ 05:05   -  157  05-09-20 @ 05:30   -  148<L>    Trend Sodium  05-11-20 @ 05:30   -  144  05-10-20 @ 05:05   -  151<H>  05-09-20 @ 05:30   -  149<H>  05-08-20 @ 20:15   -  152<H>  05-08-20 @ 15:00   -  154<H>    Trend Potassium  05-11-20 @ 05:30   -  3.4<L>  05-10-20 @ 05:05   -  3.5  05-09-20 @ 05:30   -  4.0  05-08-20 @ 20:15   -  4.2  05-08-20 @ 15:00   -  4.2    Trend Bun/Cr  05-11-20 @ 05:30  BUN/CR -  28<H> / 0.70  05-10-20 @ 05:05  BUN/CR -  45<H> / 0.80  05-09-20 @ 05:30  BUN/CR -  88<H> / 1.21  05-08-20 @ 20:15  BUN/CR -  78<H> / 1.17  05-08-20 @ 15:00  BUN/CR -  78<H> / 1.34<H>        ABG Trend  05-09-20 @ 14:30   - 7.46<H>/35/69<L>/95  05-09-20 @ 08:15   - 7.43/37/63<L>/92  05-07-20 @ 23:25   - 7.21<L>/68<H>/103/96  05-07-20 @ 16:25   - 7.19<LL>/75<HH>/90/93  05-07-20 @ 13:35   - 7.41/34/50<LL>/83<L>          CULTURES: (if applicable)    Culture - Sputum (collected 05-08-20 @ 12:15)  Source: .Sputum Sputum  Gram Stain (05-08-20 @ 18:51):    Numerous polymorphonuclear leukocytes per low power field    Rare Squamous epithelial cells per low power field    Numerous Yeast like cells per oil power field  Final Report (05-10-20 @ 21:57):    Normal Respiratory Ankita present    Culture - Blood (collected 05-07-20 @ 19:00)  Source: .Blood Blood-Peripheral  Preliminary Report (05-09-20 @ 01:02):    No growth to date.    Culture - Urine (collected 05-07-20 @ 14:33)  Source: .Urine Catheterized  Final Report (05-08-20 @ 16:38):    No growth    Culture - Blood (collected 05-07-20 @ 13:33)  Source: .Blood Blood-Peripheral  Preliminary Report (05-08-20 @ 19:01):    No growth to date.          VITALS:  T(C): 37 (05-11-20 @ 10:00), Max: 37.2 (05-11-20 @ 05:00)  T(F): 98.6 (05-11-20 @ 10:00), Max: 99 (05-11-20 @ 05:00)  HR: 75 (05-11-20 @ 13:00) (64 - 81)  BP: 107/68 (05-11-20 @ 13:00) (97/58 - 133/66)  BP(mean): 76 (05-10-20 @ 16:52) (76 - 76)  ABP: 102/51 (05-11-20 @ 06:00) (102/51 - 145/65)  ABP(mean): --  RR: 14 (05-11-20 @ 13:00) (12 - 20)  SpO2: 97% (05-11-20 @ 13:00) (94% - 100%)  CVP(mm Hg): --  CVP(cm H2O): --    Ins and Outs     05-10-20 @ 07:01  -  05-11-20 @ 07:00  --------------------------------------------------------  IN: 2925 mL / OUT: 1950 mL / NET: 975 mL    05-11-20 @ 07:01  -  05-11-20 @ 14:35  --------------------------------------------------------  IN: 50 mL / OUT: 500 mL / NET: -450 mL        Height (cm): 177.8 (05-09-20 @ 10:04)  Weight (kg): 68 (05-09-20 @ 10:04)  BMI (kg/m2): 21.5 (05-09-20 @ 10:04)        I&O's Detail    10 May 2020 07:01  -  11 May 2020 07:00  --------------------------------------------------------  IN:    dextrose 5%.: 1725 mL    Enteral Tube Flush: 100 mL    Enteral Tube Flush: 300 mL    heparin  Infusion.: 151 mL    heparin  Infusion.: 149 mL    Solution: 500 mL  Total IN: 2925 mL    OUT:    Indwelling Catheter - Urethral: 1950 mL  Total OUT: 1950 mL    Total NET: 975 mL      11 May 2020 07:01  -  11 May 2020 14:35  --------------------------------------------------------  IN:    Solution: 50 mL  Total IN: 50 mL    OUT:    Indwelling Catheter - Urethral: 500 mL  Total OUT: 500 mL    Total NET: -450 mL

## 2020-05-11 NOTE — PROGRESS NOTE ADULT - ASSESSMENT
Assessment  1. Hypoxic respiratory failure requiring intubation with Right sided pneumonia unclear if related to COVID 19 vs Suspected PE  2. Septic/hypovolumic shock Resolved  3. Acute renal failure with Hypernatremia with lactic acidosis - Resolved  4. Metabolic encephalopathy ? now back to baseline  5. PMHx of HTN, Bipolar disorder, Glaucoma, TBI, NPH with  shunt  6. Elevated D-Dimer- high suspicion for VTE - Right Calf DVT on US    PLAN:  - continue current care,   - S&S f/u to advance diet  - NGT feeding  - continue IVF, Continue IV abx  - cvp/lita removed  -  palliative care f/u  d/w Dr Roe transfer to medicine.   [facility] DNR .

## 2020-05-11 NOTE — PROGRESS NOTE ADULT - SUBJECTIVE AND OBJECTIVE BOX
Patient is a 69y old  Male who presents with a chief complaint of hypoxic respiratory failure (10 May 2020 09:57)      Patient seen and examined at bedside.     ALLERGIES:  No Known Allergies    MEDICATIONS  (STANDING):  ALPRAZolam 0.125 milliGRAM(s) Oral three times a day  cefepime   IVPB 2000 milliGRAM(s) IV Intermittent <User Schedule>  chlorhexidine 4% Liquid 1 Application(s) Topical <User Schedule>  dextrose 5%. 1000 milliLiter(s) (75 mL/Hr) IV Continuous <Continuous>  dextrose 5%. 1000 milliLiter(s) (50 mL/Hr) IV Continuous <Continuous>  dextrose 50% Injectable 12.5 Gram(s) IV Push once  dextrose 50% Injectable 25 Gram(s) IV Push once  dextrose 50% Injectable 25 Gram(s) IV Push once  diVALproex Sprinkle 750 milliGRAM(s) Oral every 12 hours  famotidine    Tablet 20 milliGRAM(s) Oral daily  heparin  Infusion. 1400 Unit(s)/Hr (14 mL/Hr) IV Continuous <Continuous>  insulin lispro (HumaLOG) corrective regimen sliding scale   SubCutaneous every 6 hours  lamoTRIgine 100 milliGRAM(s) Oral two times a day  latanoprost 0.005% Ophthalmic Solution 1 Drop(s) Both EYES at bedtime  vancomycin  IVPB 750 milliGRAM(s) IV Intermittent every 12 hours    MEDICATIONS  (PRN):  acetaminophen   Tablet .. 975 milliGRAM(s) Oral every 6 hours PRN Temp greater or equal to 38C (100.4F)  dextrose 40% Gel 15 Gram(s) Oral once PRN Blood Glucose LESS THAN 70 milliGRAM(s)/deciliter    Vital Signs Last 24 Hrs  T(F): 98.6 (11 May 2020 10:00), Max: 99 (11 May 2020 05:00)  HR: 81 (11 May 2020 10:00) (64 - 81)  BP: 133/66 (11 May 2020 10:00) (97/58 - 133/66)  RR: 12 (11 May 2020 10:00) (12 - 20)  SpO2: 95% (11 May 2020 10:00) (94% - 100%)  I&O's Summary    10 May 2020 07:01  -  11 May 2020 07:00  --------------------------------------------------------  IN: 2925 mL / OUT: 1950 mL / NET: 975 mL    11 May 2020 07:01  -  11 May 2020 12:44  --------------------------------------------------------  IN: 50 mL / OUT: 500 mL / NET: -450 mL    O2 Sat: 86% on RA at rest      PHYSICAL EXAM:  General: NAD, follows to simple commands, nonverbal  ENT: DRY mucous membranes, no thrush, NG tube in place  Neck: Supple, No JVD  Lungs: Clear to auscultation bilaterally limited anteriorly and poor effort, non-labored breathing  Cardio: RRR, S1/S2  Abdomen: Soft, Nontender, Nondistended; Bowel sounds present  Extremities: No calf tenderness, No pitting edema  : Ying in place  Skin: Xerosis, scattered ecchymosis, skin tenting    LABS:                        11.6   14.23 )-----------( 162      ( 11 May 2020 05:30 )             35.1     05-11    144  |  109  |  28  ----------------------------<  177  3.4   |  29  |  0.70    Ca    7.7      11 May 2020 05:30  Phos  1.4     05-11  Mg     2.4     05-11    eGFR if Non African American: 98 mL/min/1.73M2 (05-11-20 @ 05:30)  eGFR if : 113 mL/min/1.73M2 (05-11-20 @ 05:30)    PTT - ( 11 May 2020 11:01 )  PTT:32.4 sec    ABG - ( 09 May 2020 14:30 )  pH, Arterial: 7.46  pH, Blood: x     /  pCO2: 35    /  pO2: 69    / HCO3: x     / Base Excess: x     /  SaO2: 95        POCT Blood Glucose.: 91 mg/dL (11 May 2020 11:16)  POCT Blood Glucose.: 158 mg/dL (11 May 2020 05:37)  POCT Blood Glucose.: 107 mg/dL (10 May 2020 23:25)  POCT Blood Glucose.: 130 mg/dL (10 May 2020 16:32)    Culture - Sputum (collected 08 May 2020 12:15)  Source: .Sputum Sputum  Gram Stain (08 May 2020 18:51):    Numerous polymorphonuclear leukocytes per low power field    Rare Squamous epithelial cells per low power field    Numerous Yeast like cells per oil power field  Final Report (10 May 2020 21:57):    Normal Respiratory Ankita present    Culture - Blood (collected 07 May 2020 19:00)  Source: .Blood Blood-Peripheral  Preliminary Report (09 May 2020 01:02):    No growth to date.    Culture - Urine (collected 07 May 2020 14:33)  Source: .Urine Catheterized  Final Report (08 May 2020 16:38):    No growth    Culture - Blood (collected 07 May 2020 13:33)  Source: .Blood Blood-Peripheral  Preliminary Report (08 May 2020 19:01):    No growth to date.

## 2020-05-12 LAB
ANION GAP SERPL CALC-SCNC: 8 MMOL/L — SIGNIFICANT CHANGE UP (ref 5–17)
ANISOCYTOSIS BLD QL: SLIGHT — SIGNIFICANT CHANGE UP
BASOPHILS # BLD AUTO: 0 K/UL — SIGNIFICANT CHANGE UP (ref 0–0.2)
BASOPHILS NFR BLD AUTO: 0 % — SIGNIFICANT CHANGE UP (ref 0–2)
BUN SERPL-MCNC: 22 MG/DL — SIGNIFICANT CHANGE UP (ref 7–23)
CALCIUM SERPL-MCNC: 7.5 MG/DL — LOW (ref 8.4–10.5)
CHLORIDE SERPL-SCNC: 107 MMOL/L — SIGNIFICANT CHANGE UP (ref 96–108)
CO2 SERPL-SCNC: 25 MMOL/L — SIGNIFICANT CHANGE UP (ref 22–31)
CREAT SERPL-MCNC: 0.65 MG/DL — SIGNIFICANT CHANGE UP (ref 0.5–1.3)
CULTURE RESULTS: SIGNIFICANT CHANGE UP
EOSINOPHIL # BLD AUTO: 0.12 K/UL — SIGNIFICANT CHANGE UP (ref 0–0.5)
EOSINOPHIL NFR BLD AUTO: 1 % — SIGNIFICANT CHANGE UP (ref 0–6)
GLUCOSE BLDC GLUCOMTR-MCNC: 101 MG/DL — HIGH (ref 70–99)
GLUCOSE BLDC GLUCOMTR-MCNC: 109 MG/DL — HIGH (ref 70–99)
GLUCOSE BLDC GLUCOMTR-MCNC: 138 MG/DL — HIGH (ref 70–99)
GLUCOSE BLDC GLUCOMTR-MCNC: 161 MG/DL — HIGH (ref 70–99)
GLUCOSE BLDC GLUCOMTR-MCNC: 168 MG/DL — HIGH (ref 70–99)
GLUCOSE SERPL-MCNC: 166 MG/DL — HIGH (ref 70–99)
HCT VFR BLD CALC: 35.1 % — LOW (ref 39–50)
HCT VFR BLD CALC: 36.2 % — LOW (ref 39–50)
HGB BLD-MCNC: 11.5 G/DL — LOW (ref 13–17)
HGB BLD-MCNC: 11.6 G/DL — LOW (ref 13–17)
LYMPHOCYTES # BLD AUTO: 0.37 K/UL — LOW (ref 1–3.3)
LYMPHOCYTES # BLD AUTO: 3 % — LOW (ref 13–44)
MAGNESIUM SERPL-MCNC: 2.2 MG/DL — SIGNIFICANT CHANGE UP (ref 1.6–2.6)
MANUAL SMEAR VERIFICATION: SIGNIFICANT CHANGE UP
MCHC RBC-ENTMCNC: 30.5 PG — SIGNIFICANT CHANGE UP (ref 27–34)
MCHC RBC-ENTMCNC: 31.3 PG — SIGNIFICANT CHANGE UP (ref 27–34)
MCHC RBC-ENTMCNC: 32 GM/DL — SIGNIFICANT CHANGE UP (ref 32–36)
MCHC RBC-ENTMCNC: 32.8 GM/DL — SIGNIFICANT CHANGE UP (ref 32–36)
MCV RBC AUTO: 95.3 FL — SIGNIFICANT CHANGE UP (ref 80–100)
MCV RBC AUTO: 95.6 FL — SIGNIFICANT CHANGE UP (ref 80–100)
METAMYELOCYTES # FLD: 1 % — HIGH (ref 0–0)
MONOCYTES # BLD AUTO: 0.74 K/UL — SIGNIFICANT CHANGE UP (ref 0–0.9)
MONOCYTES NFR BLD AUTO: 6 % — SIGNIFICANT CHANGE UP (ref 2–14)
MYELOCYTES NFR BLD: 1 % — HIGH (ref 0–0)
NEUTROPHILS # BLD AUTO: 10.79 K/UL — HIGH (ref 1.8–7.4)
NEUTROPHILS NFR BLD AUTO: 87 % — HIGH (ref 43–77)
NEUTS BAND # BLD: 1 % — SIGNIFICANT CHANGE UP (ref 0–8)
NEUTS HYPERSEG # BLD: PRESENT — SIGNIFICANT CHANGE UP
NRBC # BLD: 0 /100 — SIGNIFICANT CHANGE UP (ref 0–0)
NRBC # BLD: 2 /100 WBCS — HIGH (ref 0–0)
PHOSPHATE SERPL-MCNC: 1.8 MG/DL — LOW (ref 2.5–4.5)
PLAT MORPH BLD: NORMAL — SIGNIFICANT CHANGE UP
PLATELET # BLD AUTO: 127 K/UL — LOW (ref 150–400)
PLATELET # BLD AUTO: 146 K/UL — LOW (ref 150–400)
POLYCHROMASIA BLD QL SMEAR: SLIGHT — SIGNIFICANT CHANGE UP
POTASSIUM SERPL-MCNC: 3.7 MMOL/L — SIGNIFICANT CHANGE UP (ref 3.5–5.3)
POTASSIUM SERPL-SCNC: 3.7 MMOL/L — SIGNIFICANT CHANGE UP (ref 3.5–5.3)
RBC # BLD: 3.67 M/UL — LOW (ref 4.2–5.8)
RBC # BLD: 3.8 M/UL — LOW (ref 4.2–5.8)
RBC # FLD: 15.3 % — HIGH (ref 10.3–14.5)
RBC # FLD: 15.5 % — HIGH (ref 10.3–14.5)
RBC BLD AUTO: ABNORMAL
SODIUM SERPL-SCNC: 140 MMOL/L — SIGNIFICANT CHANGE UP (ref 135–145)
SPECIMEN SOURCE: SIGNIFICANT CHANGE UP
WBC # BLD: 12.26 K/UL — HIGH (ref 3.8–10.5)
WBC # BLD: 12.53 K/UL — HIGH (ref 3.8–10.5)
WBC # FLD AUTO: 12.26 K/UL — HIGH (ref 3.8–10.5)
WBC # FLD AUTO: 12.53 K/UL — HIGH (ref 3.8–10.5)

## 2020-05-12 PROCEDURE — 99232 SBSQ HOSP IP/OBS MODERATE 35: CPT

## 2020-05-12 PROCEDURE — 99233 SBSQ HOSP IP/OBS HIGH 50: CPT

## 2020-05-12 PROCEDURE — 99497 ADVNCD CARE PLAN 30 MIN: CPT

## 2020-05-12 PROCEDURE — 71045 X-RAY EXAM CHEST 1 VIEW: CPT | Mod: 26

## 2020-05-12 RX ORDER — APIXABAN 2.5 MG/1
5 TABLET, FILM COATED ORAL EVERY 12 HOURS
Refills: 0 | Status: DISCONTINUED | OUTPATIENT
Start: 2020-05-12 | End: 2020-05-15

## 2020-05-12 RX ADMIN — DIVALPROEX SODIUM 750 MILLIGRAM(S): 500 TABLET, DELAYED RELEASE ORAL at 16:46

## 2020-05-12 RX ADMIN — CEFEPIME 100 MILLIGRAM(S): 1 INJECTION, POWDER, FOR SOLUTION INTRAMUSCULAR; INTRAVENOUS at 21:00

## 2020-05-12 RX ADMIN — Medication 2: at 11:06

## 2020-05-12 RX ADMIN — CHLORHEXIDINE GLUCONATE 1 APPLICATION(S): 213 SOLUTION TOPICAL at 06:06

## 2020-05-12 RX ADMIN — LAMOTRIGINE 100 MILLIGRAM(S): 25 TABLET, ORALLY DISINTEGRATING ORAL at 05:26

## 2020-05-12 RX ADMIN — Medication 250 MILLIGRAM(S): at 17:35

## 2020-05-12 RX ADMIN — CEFEPIME 100 MILLIGRAM(S): 1 INJECTION, POWDER, FOR SOLUTION INTRAMUSCULAR; INTRAVENOUS at 09:55

## 2020-05-12 RX ADMIN — FAMOTIDINE 20 MILLIGRAM(S): 10 INJECTION INTRAVENOUS at 10:58

## 2020-05-12 RX ADMIN — LAMOTRIGINE 100 MILLIGRAM(S): 25 TABLET, ORALLY DISINTEGRATING ORAL at 16:46

## 2020-05-12 RX ADMIN — Medication 2: at 07:28

## 2020-05-12 RX ADMIN — Medication 0.12 MILLIGRAM(S): at 05:30

## 2020-05-12 RX ADMIN — DIVALPROEX SODIUM 750 MILLIGRAM(S): 500 TABLET, DELAYED RELEASE ORAL at 05:26

## 2020-05-12 RX ADMIN — Medication 0.12 MILLIGRAM(S): at 13:26

## 2020-05-12 RX ADMIN — Medication 0.12 MILLIGRAM(S): at 21:20

## 2020-05-12 RX ADMIN — APIXABAN 5 MILLIGRAM(S): 2.5 TABLET, FILM COATED ORAL at 18:47

## 2020-05-12 RX ADMIN — Medication 250 MILLIGRAM(S): at 06:08

## 2020-05-12 RX ADMIN — LATANOPROST 1 DROP(S): 0.05 SOLUTION/ DROPS OPHTHALMIC; TOPICAL at 21:00

## 2020-05-12 NOTE — PROGRESS NOTE ADULT - SUBJECTIVE AND OBJECTIVE BOX
Patient is a 69y old  Male who presents with a chief complaint of hypoxic respiratory failure (11 May 2020 15:36)      Patient seen and examined at bedside.    ALLERGIES:  No Known Allergies    MEDICATIONS  (STANDING):  ALPRAZolam 0.125 milliGRAM(s) Oral three times a day  apixaban 5 milliGRAM(s) Oral every 12 hours  cefepime   IVPB 2000 milliGRAM(s) IV Intermittent <User Schedule>  chlorhexidine 4% Liquid 1 Application(s) Topical <User Schedule>  dextrose 5%. 1000 milliLiter(s) (75 mL/Hr) IV Continuous <Continuous>  dextrose 5%. 1000 milliLiter(s) (50 mL/Hr) IV Continuous <Continuous>  dextrose 50% Injectable 12.5 Gram(s) IV Push once  dextrose 50% Injectable 25 Gram(s) IV Push once  dextrose 50% Injectable 25 Gram(s) IV Push once  diVALproex Sprinkle 750 milliGRAM(s) Oral every 12 hours  famotidine    Tablet 20 milliGRAM(s) Oral daily  insulin lispro (HumaLOG) corrective regimen sliding scale   SubCutaneous every 6 hours  lamoTRIgine 100 milliGRAM(s) Oral two times a day  latanoprost 0.005% Ophthalmic Solution 1 Drop(s) Both EYES at bedtime  vancomycin  IVPB 750 milliGRAM(s) IV Intermittent every 12 hours    MEDICATIONS  (PRN):  acetaminophen   Tablet .. 975 milliGRAM(s) Oral every 6 hours PRN Temp greater or equal to 38C (100.4F)  dextrose 40% Gel 15 Gram(s) Oral once PRN Blood Glucose LESS THAN 70 milliGRAM(s)/deciliter    Vital Signs Last 24 Hrs  T(F): 98.3 (12 May 2020 05:17), Max: 98.6 (11 May 2020 10:00)  HR: 86 (12 May 2020 05:17) (73 - 86)  BP: 121/62 (12 May 2020 05:17) (107/68 - 136/66)  RR: 18 (12 May 2020 05:17) (12 - 20)  SpO2: 91% (12 May 2020 05:17) (91% - 98%)  I&O's Summary    11 May 2020 07:01  -  12 May 2020 07:00  --------------------------------------------------------  IN: 2462 mL / OUT: 1250 mL / NET: 1212 mL      BMI (kg/m2): 21.5 (05-09-20 @ 10:04)  PHYSICAL EXAM:  General: NAD, A/O x 1  ENT: MMM  Neck: Supple, No JVD  Lungs: Clear to auscultation bilaterally  Cardio: RRR, S1/S2, No murmurs  Abdomen: Soft, Nontender, Nondistended; Bowel sounds present  Extremities: No calf tenderness, No pitting edema    LABS:                        11.5   12.53 )-----------( 146      ( 12 May 2020 06:47 )             35.1       05-12    140  |  107  |  22  ----------------------------<  166  3.7   |  25  |  0.65    Ca    7.5      12 May 2020 06:47  Phos  1.8     05-12  Mg     2.2     05-12       eGFR if Non African American: 101 mL/min/1.73M2 (05-12-20 @ 06:47)  eGFR if : 117 mL/min/1.73M2 (05-12-20 @ 06:47)    PTT - ( 11 May 2020 20:15 )  PTT:34.0 sec               ABG - ( 09 May 2020 14:30 )  pH, Arterial: 7.46  pH, Blood: x     /  pCO2: 35    /  pO2: 69    / HCO3: x     / Base Excess: x     /  SaO2: 95                      POCT Blood Glucose.: 168 mg/dL (12 May 2020 07:27)  POCT Blood Glucose.: 109 mg/dL (12 May 2020 01:54)  POCT Blood Glucose.: 92 mg/dL (11 May 2020 22:06)  POCT Blood Glucose.: 125 mg/dL (11 May 2020 16:50)  POCT Blood Glucose.: 91 mg/dL (11 May 2020 11:16)    Culture - Sputum (collected 08 May 2020 12:15)  Source: .Sputum Sputum  Gram Stain (08 May 2020 18:51):    Numerous polymorphonuclear leukocytes per low power field    Rare Squamous epithelial cells per low power field    Numerous Yeast like cells per oil power field  Final Report (10 May 2020 21:57):    Normal Respiratory Ankita present    Culture - Blood (collected 07 May 2020 19:00)  Source: .Blood Blood-Peripheral  Preliminary Report (09 May 2020 01:02):    No growth to date.    Culture - Urine (collected 07 May 2020 14:33)  Source: .Urine Catheterized  Final Report (08 May 2020 16:38):    No growth    Culture - Blood (collected 07 May 2020 13:33)  Source: .Blood Blood-Peripheral  Preliminary Report (08 May 2020 19:01):    No growth to date.        RADIOLOGY & ADDITIONAL TESTS:    Care Discussed with Consultants/Other Providers:

## 2020-05-12 NOTE — PROGRESS NOTE ADULT - SUBJECTIVE AND OBJECTIVE BOX
Progress: eyes open, non verbal, not following commands    Present Symptoms:   Dyspnea: mild  Nausea/Vomiting:   Anxiety:    Depressed Mood:   Fatigue: yes  Loss of appetite: yes  Pain:           no s/s        location:   Review of Systems:  Unable to obtain due to poor mentation]    MEDICATIONS  (STANDING):  ALPRAZolam 0.125 milliGRAM(s) Oral three times a day  apixaban 5 milliGRAM(s) Oral every 12 hours  cefepime   IVPB 2000 milliGRAM(s) IV Intermittent <User Schedule>  chlorhexidine 4% Liquid 1 Application(s) Topical <User Schedule>  dextrose 5%. 1000 milliLiter(s) (75 mL/Hr) IV Continuous <Continuous>  dextrose 5%. 1000 milliLiter(s) (50 mL/Hr) IV Continuous <Continuous>  dextrose 50% Injectable 12.5 Gram(s) IV Push once  dextrose 50% Injectable 25 Gram(s) IV Push once  dextrose 50% Injectable 25 Gram(s) IV Push once  diVALproex Sprinkle 750 milliGRAM(s) Oral every 12 hours  famotidine    Tablet 20 milliGRAM(s) Oral daily  insulin lispro (HumaLOG) corrective regimen sliding scale   SubCutaneous every 6 hours  lamoTRIgine 100 milliGRAM(s) Oral two times a day  latanoprost 0.005% Ophthalmic Solution 1 Drop(s) Both EYES at bedtime  vancomycin  IVPB 750 milliGRAM(s) IV Intermittent every 12 hours    MEDICATIONS  (PRN):  acetaminophen   Tablet .. 975 milliGRAM(s) Oral every 6 hours PRN Temp greater or equal to 38C (100.4F)  dextrose 40% Gel 15 Gram(s) Oral once PRN Blood Glucose LESS THAN 70 milliGRAM(s)/deciliter      PHYSICAL EXAM:  Vital Signs Last 24 Hrs  T(C): 36.8 (12 May 2020 10:00), Max: 36.8 (11 May 2020 20:13)  T(F): 98.3 (12 May 2020 10:00), Max: 98.3 (12 May 2020 05:17)  HR: 84 (12 May 2020 10:00) (73 - 86)  BP: 124/63 (12 May 2020 10:00) (110/65 - 136/66)  BP(mean): --  RR: 18 (12 May 2020 10:00) (16 - 20)  SpO2: 96% (12 May 2020 10:00) (91% - 98%)  General: alert non verbal, not following commands       HEENT: normal  , mouth dry   Lungs: crackles bi lat dim to bases    CV: s1s2     GI: normal  , soft   Musculoskeletal: weakened    Skin: normal, warm dry     Neuro: + deficits, non verbal     Oral intake ability:  no  Diet: NPO, NG feeds     LABS:                          11.5   12.53 )-----------( 146      ( 12 May 2020 06:47 )             35.1     05-12    140  |  107  |  22  ----------------------------<  166<H>  3.7   |  25  |  0.65    Ca    7.5<L>      12 May 2020 06:47  Phos  1.8     05-12  Mg     2.2     05-12          RADIOLOGY & ADDITIONAL STUDIES: < from: Xray Chest 1 View- PORTABLE-Routine (05.12.20 @ 10:44) >    EXAM:  XR CHEST PORTABLE ROUTINE 1V      PROCEDURE DATE:  05/12/2020        INTERPRETATION:  CLINICAL INDICATION: 69 years  Male with Abnormal Chest Sounds.    COMPARISON: 5/8/2020    An enteric tube is reidentified with its tip in the left upper quadrant of the abdomen. No ET tube is visualized. Left IJ catheter is no longer visualized. A right  shunt is noted.    2 AP views of the chest demonstrate patchy bibasilar airspace infiltrates and left upper lobe discoid atelectasis.. There is no pleural effusion. There is no pneumothorax.    The heart is normal in size. There is no mediastinal or hilar mass.     The pulmonary vasculature is normal.     Mild thoracic degenerative changes are present. Fallon rods are reidentified in the upper thoracic spine.    Prominent hepatic flexure is reidentified with air and fecal matter.    IMPRESSION:    Increasing patchy bibasilar airspace infiltrates.    NG tube in satisfactory position. ET tube and left central line no longer visualized.  shunt in situ.    Mild colonic distention.        DISCRETE X-RAY DATA:  Percent of LEFT lung opacification: 34-66%  Percent of RIGHT lung opacification: 34-66%  Change in lung opacification from most recent x-ray (<=3 days): No Prior  Change from priordated 3 or more days (same admission): Increase                  ADVANCE DIRECTIVES: HCP MOLST  DNR/DNI     Advanced Care Planning discussion total time spent:

## 2020-05-12 NOTE — PROGRESS NOTE ADULT - ASSESSMENT
69M with bipolar disorder, hx TBI, HTN, COVID19, acute lower extremity DVT, returns to hospital with acute hypoxic respiratory failure in setting of septic shock due to complex pneumonia (HAP) and suspected PE    Acute hypoxic respiratory failure secondary to Suspected secondary to gram negative pneumonia   Suspected PE (not confirmed however patient already on full dose AC for DVT   -c/w O2 support  -c/w antibiotics day #6 today  -s/p intubation, now breathing status is stable  -c/w full dose a/c, on heparin drip    #Right calf DVT (small)  - eliquis via NGT  - pending speech for follow-up re: advancing diet    #ALLYSON: RESOLVED  -Resolved with IVF    Acute metabolic encephalopathy due to hypernatremia and Hypophosphatemia  - appears at baseline now  -Speech follow-up to advance diet    COVID19 by history: Now with two neg swabs, not currently an active issue    Normal pressure hydrocephalus:  shunt in place    Bipolar disorder: c/w Lamictal and Depakote     change to eliquis today for DVT and suspected PE    Diet - NGT feeds, nutrition following

## 2020-05-12 NOTE — PROGRESS NOTE ADULT - SUBJECTIVE AND OBJECTIVE BOX
Patient is a 69y old  Male who presents with a chief complaint of hypoxic respiratory failure (12 May 2020 08:23)    Not responsive but eyes open.      Patient seen and examined at bedside.    ALLERGIES:  No Known Allergies    MEDICATIONS  (STANDING):  ALPRAZolam 0.125 milliGRAM(s) Oral three times a day  apixaban 5 milliGRAM(s) Oral every 12 hours  cefepime   IVPB 2000 milliGRAM(s) IV Intermittent <User Schedule>  chlorhexidine 4% Liquid 1 Application(s) Topical <User Schedule>  dextrose 5%. 1000 milliLiter(s) (75 mL/Hr) IV Continuous <Continuous>  dextrose 5%. 1000 milliLiter(s) (50 mL/Hr) IV Continuous <Continuous>  dextrose 50% Injectable 12.5 Gram(s) IV Push once  dextrose 50% Injectable 25 Gram(s) IV Push once  dextrose 50% Injectable 25 Gram(s) IV Push once  diVALproex Sprinkle 750 milliGRAM(s) Oral every 12 hours  famotidine    Tablet 20 milliGRAM(s) Oral daily  insulin lispro (HumaLOG) corrective regimen sliding scale   SubCutaneous every 6 hours  lamoTRIgine 100 milliGRAM(s) Oral two times a day  latanoprost 0.005% Ophthalmic Solution 1 Drop(s) Both EYES at bedtime  vancomycin  IVPB 750 milliGRAM(s) IV Intermittent every 12 hours    MEDICATIONS  (PRN):  acetaminophen   Tablet .. 975 milliGRAM(s) Oral every 6 hours PRN Temp greater or equal to 38C (100.4F)  dextrose 40% Gel 15 Gram(s) Oral once PRN Blood Glucose LESS THAN 70 milliGRAM(s)/deciliter    Vital Signs Last 24 Hrs  T(F): 98.3 (12 May 2020 05:17), Max: 98.6 (11 May 2020 10:00)  HR: 86 (12 May 2020 05:17) (73 - 86)  BP: 121/62 (12 May 2020 05:17) (107/68 - 136/66)  RR: 18 (12 May 2020 05:17) (12 - 20)  SpO2: 91% (12 May 2020 05:17) (91% - 98%)  I&O's Summary    11 May 2020 07:01  -  12 May 2020 07:00  --------------------------------------------------------  IN: 2462 mL / OUT: 1250 mL / NET: 1212 mL      BMI (kg/m2): 21.5 (05-09-20 @ 10:04)  PHYSICAL EXAM:  General: NAD, eyes open but does not track with eyes, mostly unresponsive  ENT: MMM  Neck: Supple, No JVD  Lungs: Clear to auscultation bilaterally  Cardio: RRR, S1/S2, No murmurs  Abdomen: Soft, Nontender, Nondistended; Bowel sounds present  Extremities: No calf tenderness, bilateral 1+ edema in feet and lower calves     LABS:                        11.5   12.53 )-----------( 146      ( 12 May 2020 06:47 )             35.1       05-12    140  |  107  |  22  ----------------------------<  166  3.7   |  25  |  0.65    Ca    7.5      12 May 2020 06:47  Phos  1.8     05-12  Mg     2.2     05-12       eGFR if Non African American: 101 mL/min/1.73M2 (05-12-20 @ 06:47)  eGFR if : 117 mL/min/1.73M2 (05-12-20 @ 06:47)    PTT - ( 11 May 2020 20:15 )  PTT:34.0 sec     ABG - ( 09 May 2020 14:30 )  pH, Arterial: 7.46  pH, Blood: x     /  pCO2: 35    /  pO2: 69    / HCO3: x     / Base Excess: x     /  SaO2: 95        POCT Blood Glucose.: 168 mg/dL (12 May 2020 07:27)  POCT Blood Glucose.: 109 mg/dL (12 May 2020 01:54)  POCT Blood Glucose.: 92 mg/dL (11 May 2020 22:06)  POCT Blood Glucose.: 125 mg/dL (11 May 2020 16:50)  POCT Blood Glucose.: 91 mg/dL (11 May 2020 11:16)    Culture - Sputum (collected 08 May 2020 12:15)  Source: .Sputum Sputum  Gram Stain (08 May 2020 18:51):    Numerous polymorphonuclear leukocytes per low power field    Rare Squamous epithelial cells per low power field    Numerous Yeast like cells per oil power field  Final Report (10 May 2020 21:57):    Normal Respiratory Ankita present    Culture - Blood (collected 07 May 2020 19:00)  Source: .Blood Blood-Peripheral  Preliminary Report (09 May 2020 01:02):    No growth to date.    Culture - Urine (collected 07 May 2020 14:33)  Source: .Urine Catheterized  Final Report (08 May 2020 16:38):    No growth    Culture - Blood (collected 07 May 2020 13:33)  Source: .Blood Blood-Peripheral  Preliminary Report (08 May 2020 19:01):    No growth to date.        RADIOLOGY & ADDITIONAL TESTS:    Care Discussed with Consultants/Other Providers:

## 2020-05-12 NOTE — PROGRESS NOTE ADULT - ASSESSMENT
A/P 68 yo M with PMHx of HTN, TBI, NPH with  shunt presented from NH for eval of hypoxia. Of note patient known to be COVID 19 positive 3 weeks ago. Per facility recent testing returned negative for COVID 19 infection.  Secondary to poor baseline mental status, increased work of breathing and hypoxia patient intubated emergent in the ED on 5/7. Now extubated on 5/9 . Pt in tele unit now.     Hypoxic respiratory failure requiring intubation with Right sided pneumonia unclear if related to COVID 19 vs Suspected PE  2. Septic/hypovolumic shock Resolved  3. Acute renal failure with Hypernatremia with lactic acidosis - Resolved  4. Metabolic encephalopathy ? now back to baseline  5. PMHx of HTN, Bipolar disorder, Glaucoma, TBI, NPH with  shunt  6. Elevated D-Dimer- high suspicion for VTE - Right Calf DVT on US    PLAN:  - continue current care,   - S&S f/u to advance diet  - NGT feeding  - continue IVF, Continue IV abx  - cvp/lita removed  -  palliative care f/u    Palliative :  f/u GOC , pt more awake, today w/ eyes open, but still non verbal and does not follow commands, and still unable to safely eat. Called pts cousin and HCP Anel , we set up Face time session today, she was very happy to have seen her cousin today .  We had discussion after face time today and Anel  reports this is not pts baseline, this is less than what he was, he used to be able to feed himself with a set up , and was still attempting to walk using a walker, although she  reported his gait as a "shuffle gait".  She realizes this may be possibly due to his dementia progressing. We discussed that he is still not safe to eat by mouth and we are feeding him by the NG tube, we reviewed this is a temporary measure and that we will need to discuss if he would want a permanent feeding tube. I explained that using a peg tube carries some risks and will not change the dementia trajectory.  She understood this. I then asked again about re-intubation if this would be something they would want to do again, at this time, cousin said No , that she would not want him intubated again, I told her I can take this as a verbal and can fill out new molst form for pt to be DNR/DNI. She agreed , she will be speaking to her family about the feeding tube and will let us know this week.   Palliative can follow w/ med team. Goc on going regarding alternate means of nutrition .

## 2020-05-12 NOTE — CHART NOTE - NSCHARTNOTEFT_GEN_A_CORE
Nutrition Follow Up Note  Hospital Course (Per Electronic Medical Record):   Source: Medical Record [X] MD [X] Nursing Staff [X]     Diet: Nepro @ 50cc/hr x 24hrs via NGT     Patient noted tolerating current NGT feeds , SLP eval noted, recommended maintain NPO (5/11). MD notes SLP to follow up , suggest   ? PEG placement if family receptive.     Current Weight: 147.7/67kg , ? weight status due to discrepancy in follow up weights     Pertinent Medications: MEDICATIONS  (STANDING):  ALPRAZolam 0.125 milliGRAM(s) Oral three times a day  apixaban 5 milliGRAM(s) Oral every 12 hours  cefepime   IVPB 2000 milliGRAM(s) IV Intermittent <User Schedule>  chlorhexidine 4% Liquid 1 Application(s) Topical <User Schedule>  dextrose 5%. 1000 milliLiter(s) (75 mL/Hr) IV Continuous <Continuous>  dextrose 5%. 1000 milliLiter(s) (50 mL/Hr) IV Continuous <Continuous>  dextrose 50% Injectable 12.5 Gram(s) IV Push once  dextrose 50% Injectable 25 Gram(s) IV Push once  dextrose 50% Injectable 25 Gram(s) IV Push once  diVALproex Sprinkle 750 milliGRAM(s) Oral every 12 hours  famotidine    Tablet 20 milliGRAM(s) Oral daily  insulin lispro (HumaLOG) corrective regimen sliding scale   SubCutaneous every 6 hours  lamoTRIgine 100 milliGRAM(s) Oral two times a day  latanoprost 0.005% Ophthalmic Solution 1 Drop(s) Both EYES at bedtime  vancomycin  IVPB 750 milliGRAM(s) IV Intermittent every 12 hours    MEDICATIONS  (PRN):  acetaminophen   Tablet .. 975 milliGRAM(s) Oral every 6 hours PRN Temp greater or equal to 38C (100.4F)  dextrose 40% Gel 15 Gram(s) Oral once PRN Blood Glucose LESS THAN 70 milliGRAM(s)/deciliter      Pertinent Labs:  05-12 Na140 mmol/L Glu 166 mg/dL<H> K+ 3.7 mmol/L Cr  0.65 mg/dL BUN 22 mg/dL 05-12 Phos 1.8 mg/dL<L> 05-07 Alb 2.5 g/dL<L>        Skin: coccyx- suspected DTI, L buttock stage 2 noted       Edema: (+3)(L//R) wrists     Last BM: on (5/9)     Estimated Needs:   [X] No Change since Previous Assessment    Previous Nutrition Diagnosis:  Severe Malnutrition    Nutrition Diagnosis is [X] Ongoing         New Nutrition Diagnosis: [X] Not Applicable    Interventions:   1. Recommend continue current EN feeds   2. ? PEG placement , follow SLP recommendations     Monitoring & Evaluation: will monitor:  [X] Weights   [X] Tolerance to EN feeds   [X] Follow Up (Per Protocol)         RD to follow as per Nutrition protocol  Maribel Stone RDN

## 2020-05-13 LAB
ANION GAP SERPL CALC-SCNC: 7 MMOL/L — SIGNIFICANT CHANGE UP (ref 5–17)
BUN SERPL-MCNC: 21 MG/DL — SIGNIFICANT CHANGE UP (ref 7–23)
CALCIUM SERPL-MCNC: 7.6 MG/DL — LOW (ref 8.4–10.5)
CHLORIDE SERPL-SCNC: 106 MMOL/L — SIGNIFICANT CHANGE UP (ref 96–108)
CO2 SERPL-SCNC: 27 MMOL/L — SIGNIFICANT CHANGE UP (ref 22–31)
CREAT SERPL-MCNC: 0.71 MG/DL — SIGNIFICANT CHANGE UP (ref 0.5–1.3)
CULTURE RESULTS: SIGNIFICANT CHANGE UP
GLUCOSE BLDC GLUCOMTR-MCNC: 121 MG/DL — HIGH (ref 70–99)
GLUCOSE BLDC GLUCOMTR-MCNC: 144 MG/DL — HIGH (ref 70–99)
GLUCOSE BLDC GLUCOMTR-MCNC: 150 MG/DL — HIGH (ref 70–99)
GLUCOSE BLDC GLUCOMTR-MCNC: 151 MG/DL — HIGH (ref 70–99)
GLUCOSE SERPL-MCNC: 161 MG/DL — HIGH (ref 70–99)
HCT VFR BLD CALC: 33 % — LOW (ref 39–50)
HGB BLD-MCNC: 11 G/DL — LOW (ref 13–17)
MCHC RBC-ENTMCNC: 31.3 PG — SIGNIFICANT CHANGE UP (ref 27–34)
MCHC RBC-ENTMCNC: 33.3 GM/DL — SIGNIFICANT CHANGE UP (ref 32–36)
MCV RBC AUTO: 94 FL — SIGNIFICANT CHANGE UP (ref 80–100)
NRBC # BLD: 0 /100 WBCS — SIGNIFICANT CHANGE UP (ref 0–0)
PLATELET # BLD AUTO: 224 K/UL — SIGNIFICANT CHANGE UP (ref 150–400)
POTASSIUM SERPL-MCNC: 3.3 MMOL/L — LOW (ref 3.5–5.3)
POTASSIUM SERPL-SCNC: 3.3 MMOL/L — LOW (ref 3.5–5.3)
RBC # BLD: 3.51 M/UL — LOW (ref 4.2–5.8)
RBC # FLD: 15.8 % — HIGH (ref 10.3–14.5)
SODIUM SERPL-SCNC: 140 MMOL/L — SIGNIFICANT CHANGE UP (ref 135–145)
SPECIMEN SOURCE: SIGNIFICANT CHANGE UP
VANCOMYCIN TROUGH SERPL-MCNC: 21 UG/ML — HIGH (ref 10–20)
WBC # BLD: 11.98 K/UL — HIGH (ref 3.8–10.5)
WBC # FLD AUTO: 11.98 K/UL — HIGH (ref 3.8–10.5)

## 2020-05-13 PROCEDURE — 71045 X-RAY EXAM CHEST 1 VIEW: CPT | Mod: 26

## 2020-05-13 PROCEDURE — 99233 SBSQ HOSP IP/OBS HIGH 50: CPT

## 2020-05-13 RX ORDER — FUROSEMIDE 40 MG
40 TABLET ORAL ONCE
Refills: 0 | Status: COMPLETED | OUTPATIENT
Start: 2020-05-13 | End: 2020-05-13

## 2020-05-13 RX ORDER — ACETAMINOPHEN 500 MG
650 TABLET ORAL EVERY 6 HOURS
Refills: 0 | Status: DISCONTINUED | OUTPATIENT
Start: 2020-05-13 | End: 2020-05-18

## 2020-05-13 RX ADMIN — LAMOTRIGINE 100 MILLIGRAM(S): 25 TABLET, ORALLY DISINTEGRATING ORAL at 16:28

## 2020-05-13 RX ADMIN — Medication 650 MILLIGRAM(S): at 08:41

## 2020-05-13 RX ADMIN — SODIUM CHLORIDE 75 MILLILITER(S): 9 INJECTION, SOLUTION INTRAVENOUS at 08:06

## 2020-05-13 RX ADMIN — Medication 40 MILLIGRAM(S): at 08:26

## 2020-05-13 RX ADMIN — CHLORHEXIDINE GLUCONATE 1 APPLICATION(S): 213 SOLUTION TOPICAL at 05:49

## 2020-05-13 RX ADMIN — DIVALPROEX SODIUM 750 MILLIGRAM(S): 500 TABLET, DELAYED RELEASE ORAL at 05:48

## 2020-05-13 RX ADMIN — Medication 0: at 16:28

## 2020-05-13 RX ADMIN — DIVALPROEX SODIUM 750 MILLIGRAM(S): 500 TABLET, DELAYED RELEASE ORAL at 16:28

## 2020-05-13 RX ADMIN — LAMOTRIGINE 100 MILLIGRAM(S): 25 TABLET, ORALLY DISINTEGRATING ORAL at 05:49

## 2020-05-13 RX ADMIN — CEFEPIME 100 MILLIGRAM(S): 1 INJECTION, POWDER, FOR SOLUTION INTRAMUSCULAR; INTRAVENOUS at 23:13

## 2020-05-13 RX ADMIN — Medication 2: at 11:30

## 2020-05-13 RX ADMIN — CEFEPIME 100 MILLIGRAM(S): 1 INJECTION, POWDER, FOR SOLUTION INTRAMUSCULAR; INTRAVENOUS at 08:06

## 2020-05-13 RX ADMIN — APIXABAN 5 MILLIGRAM(S): 2.5 TABLET, FILM COATED ORAL at 05:48

## 2020-05-13 RX ADMIN — Medication 0.12 MILLIGRAM(S): at 05:47

## 2020-05-13 RX ADMIN — LATANOPROST 1 DROP(S): 0.05 SOLUTION/ DROPS OPHTHALMIC; TOPICAL at 23:13

## 2020-05-13 RX ADMIN — FAMOTIDINE 20 MILLIGRAM(S): 10 INJECTION INTRAVENOUS at 11:30

## 2020-05-13 RX ADMIN — Medication 0.12 MILLIGRAM(S): at 23:12

## 2020-05-13 RX ADMIN — APIXABAN 5 MILLIGRAM(S): 2.5 TABLET, FILM COATED ORAL at 16:28

## 2020-05-13 NOTE — PROGRESS NOTE ADULT - SUBJECTIVE AND OBJECTIVE BOX
Progress:  has  increased  work of breathing today , requiring non rebreather mask , and  with low grade fever.     Present Symptoms:   Dyspnea: yes  Nausea/Vomiting:   Anxiety:    Depressed Mood:   Fatigue: yes  Loss of appetite:   Pain:                   location:   Review of Systems: Unable to obtain due to poor mentation]    MEDICATIONS  (STANDING):  ALPRAZolam 0.125 milliGRAM(s) Oral three times a day  apixaban 5 milliGRAM(s) Oral every 12 hours  cefepime   IVPB 2000 milliGRAM(s) IV Intermittent <User Schedule>  chlorhexidine 4% Liquid 1 Application(s) Topical <User Schedule>  dextrose 5%. 1000 milliLiter(s) (50 mL/Hr) IV Continuous <Continuous>  dextrose 50% Injectable 12.5 Gram(s) IV Push once  dextrose 50% Injectable 25 Gram(s) IV Push once  dextrose 50% Injectable 25 Gram(s) IV Push once  diVALproex Sprinkle 750 milliGRAM(s) Oral every 12 hours  famotidine    Tablet 20 milliGRAM(s) Oral daily  insulin lispro (HumaLOG) corrective regimen sliding scale   SubCutaneous every 6 hours  lamoTRIgine 100 milliGRAM(s) Oral two times a day  latanoprost 0.005% Ophthalmic Solution 1 Drop(s) Both EYES at bedtime    MEDICATIONS  (PRN):  acetaminophen   Tablet .. 975 milliGRAM(s) Oral every 6 hours PRN Temp greater or equal to 38C (100.4F)  acetaminophen  Suppository .. 650 milliGRAM(s) Rectal every 6 hours PRN Temp greater or equal to 38C (100.4F), Moderate Pain (4 - 6), Severe Pain (7 - 10)  dextrose 40% Gel 15 Gram(s) Oral once PRN Blood Glucose LESS THAN 70 milliGRAM(s)/deciliter      PHYSICAL EXAM:  Vital Signs Last 24 Hrs  T(C): 38.2 (13 May 2020 09:00), Max: 38.2 (13 May 2020 09:00)  T(F): 100.7 (13 May 2020 09:00), Max: 100.7 (13 May 2020 09:00)  HR: 95 (13 May 2020 08:15) (83 - 95)  BP: 125/62 (13 May 2020 08:15) (125/62 - 146/77)  BP(mean): 77 (13 May 2020 08:15) (77 - 77)  RR: 16 (13 May 2020 08:15) (16 - 17)  SpO2: 91% (13 May 2020 08:15) (91% - 98%)  General: lethargic, non verbal      HEENT: n/c, a/ t    Lungs: coarse , crackles    CV: tachy    GI: soft    : jose    Musculoskeletal: weakened   Skin: w/d    Neuro: + deficits   Oral intake ability:  no   Diet: NPO, NG feeds     LABS:                          11.0   11.98 )-----------( 224      ( 13 May 2020 07:16 )             33.0     05-13    140  |  106  |  21  ----------------------------<  161<H>  3.3<L>   |  27  |  0.71    Ca    7.6<L>      13 May 2020 07:16  Phos  1.8     05-12  Mg     2.2     05-12          RADIOLOGY & ADDITIONAL STUDIES:< from: Xray Chest 1 View-PORTABLE IMMEDIATE (05.13.20 @ 08:48) >    EXAM:  XR CHEST PORTABLE IMMED 1V      PROCEDURE DATE:  05/13/2020        INTERPRETATION:    Examination: XR CHEST IMMEDIATE    History: ADMDIAG1: J96.01 ACUTE RESPIRATORY FAILURE WITH HYPOXIA/, Abnormal Chest Sounds    Portable chest x-ray is compared to a previous examination dated 5/12/2020.    Impression: Stable NG tube. Stable right  shunt. Stable orthopedic hardware at the upper thoracic spine.    No evidence for pleural effusion or pneumothorax.    Grossly stable airspace opacifications in both lungs.    Stable cardiac silhouette.     Stable gaseous dilatation of the splenic flexure.      DISCRETE X-RAY DATA:  Percent of LEFT lung opacification: 34-66%  Percent of RIGHT lung opacification: 34-66%  Change in lung opacification from most recent x-ray (<=3 days): Stable  Change from prior dated 3 or more days (same admission): Increase            ADVANCE DIRECTIVES:  HCP  MOLST  DNR/DNI    Advanced Care Planning discussion total time spent:

## 2020-05-13 NOTE — PROGRESS NOTE ADULT - ASSESSMENT
A/P 70 yo M with PMHx of HTN, TBI, NPH with  shunt presented from NH for eval of hypoxia. Of note patient known to be COVID 19 positive 3 weeks ago. Per facility recent testing returned negative for COVID 19 infection.  Secondary to poor baseline mental status, increased work of breathing and hypoxia patient intubated emergent in the ED on 5/7. Now extubated on 5/9 . Pt in tele unit now.     Hypoxic respiratory failure requiring intubation with Right sided pneumonia unclear if related to COVID 19 vs Suspected PE  2. Septic/hypovolumic shock Resolved  3. Acute renal failure with Hypernatremia with lactic acidosis - Resolved  4. Metabolic encephalopathy ? now back to baseline  5. PMHx of HTN, Bipolar disorder, Glaucoma, TBI, NPH with  shunt  6. Elevated D-Dimer- high suspicion for VTE - Right Calf DVT on US    PLAN:  - continue current care,   - S&S f/u to advance diet  - NGT feeding  - continue IVF, Continue IV abx  - cvp/lita removed    Palliative : as follow up, today pt noted to be dyspneic this Am and is now on a non rebreather mask, remains lethargic, non verbal, not following commands. Also of note is a low grade temperature today . Possibly another aspiration PNA?, today's CXR  not reflecting  this yet. I called and spoke to pts mell Tam, I informed her of the changes in patient today, she stated understanding. She stated she was going to tell me  that they had decided they would want the peg tube placed, however with this new update on pt, we discussed that this decision  would need to be on hold at this point, and reviewed in near future.  Speech following but pt too lethargic to test.       Mell Tam has my contact info for questions/concerns.  Following w/ med team for support, on going discussion regarding alt means nutrition.   GOC est yesterday as DNR/DNI. A/P 70 yo M with PMHx of HTN, TBI, NPH with  shunt presented from NH for eval of hypoxia. Of note patient known to be COVID 19 positive 3 weeks ago. Per facility recent testing returned negative for COVID 19 infection.  Secondary to poor baseline mental status, increased work of breathing and hypoxia patient intubated emergent in the ED on 5/7. Now extubated on 5/9 . Pt in tele unit now.     Hypoxic respiratory failure requiring intubation with Right sided pneumonia unclear if related to COVID 19 vs Suspected PE  2. Septic/hypovolumic shock Resolved  3. Acute renal failure with Hypernatremia with lactic acidosis - Resolved  4. Metabolic encephalopathy ? now back to baseline  5. PMHx of HTN, Bipolar disorder, Glaucoma, TBI, NPH with  shunt  6. Elevated D-Dimer- high suspicion for VTE - Right Calf DVT on US    PLAN:  - continue current care,   - S&S f/u to advance diet  - NGT feeding  - continue IVF, Continue IV abx  - cvp/lita removed    Palliative : as follow up, today pt noted to be dyspneic this Am and is now on a non rebreather mask, remains lethargic, non verbal, not following commands. Also of note is a low grade temperature today . Possibly another aspiration PNA?, today's CXR  not reflecting  this yet. I called and spoke to pts mell Tam, I informed her of the changes in patient today, she stated understanding. She stated she was going to tell me  that they had decided they would want the peg tube placed, however with this new update on pt, we discussed that this decision  would need to be on hold , and discussed again tomorrow, cousin agreed .  Speech following but pt too lethargic to test today.       Mell Tam has my contact info for questions/concerns.  Following case w/ med team for support, on going discussion regarding alt means nutrition.   GOC est yesterday as DNR/DNI.

## 2020-05-13 NOTE — PROGRESS NOTE ADULT - SUBJECTIVE AND OBJECTIVE BOX
Patient is a 69y old  Male who presents with a chief complaint of hypoxic respiratory failure (13 May 2020 14:08)      Patient seen and examined at bedside. Alert, non verbal     ALLERGIES:  No Known Allergies    MEDICATIONS  (STANDING):  ALPRAZolam 0.125 milliGRAM(s) Oral three times a day  apixaban 5 milliGRAM(s) Oral every 12 hours  cefepime   IVPB 2000 milliGRAM(s) IV Intermittent <User Schedule>  chlorhexidine 4% Liquid 1 Application(s) Topical <User Schedule>  dextrose 5%. 1000 milliLiter(s) (50 mL/Hr) IV Continuous <Continuous>  dextrose 50% Injectable 12.5 Gram(s) IV Push once  dextrose 50% Injectable 25 Gram(s) IV Push once  dextrose 50% Injectable 25 Gram(s) IV Push once  diVALproex Sprinkle 750 milliGRAM(s) Oral every 12 hours  famotidine    Tablet 20 milliGRAM(s) Oral daily  insulin lispro (HumaLOG) corrective regimen sliding scale   SubCutaneous every 6 hours  lamoTRIgine 100 milliGRAM(s) Oral two times a day  latanoprost 0.005% Ophthalmic Solution 1 Drop(s) Both EYES at bedtime    MEDICATIONS  (PRN):  acetaminophen   Tablet .. 975 milliGRAM(s) Oral every 6 hours PRN Temp greater or equal to 38C (100.4F)  acetaminophen  Suppository .. 650 milliGRAM(s) Rectal every 6 hours PRN Temp greater or equal to 38C (100.4F), Moderate Pain (4 - 6), Severe Pain (7 - 10)  dextrose 40% Gel 15 Gram(s) Oral once PRN Blood Glucose LESS THAN 70 milliGRAM(s)/deciliter    Vital Signs Last 24 Hrs  T(F): 98.4 (13 May 2020 18:46), Max: 100.7 (13 May 2020 09:00)  HR: 87 (13 May 2020 18:46) (87 - 95)  BP: 124/78 (13 May 2020 18:46) (124/78 - 146/77)  RR: 16 (13 May 2020 18:46) (16 - 17)  SpO2: 97% (13 May 2020 18:46) (91% - 97%)  I&O's Summary    13 May 2020 07:01  -  13 May 2020 19:02  --------------------------------------------------------  IN: 0 mL / OUT: 250 mL / NET: -250 mL      PHYSICAL EXAM:  General: Ill appearing, non verbal, minimally responsive, Unable to follow commands  ENT: dry MM  Neck: Supple, No JVD  Lungs: Decreased to auscultation bilaterally, resp even and unlabored   Cardio: RRR, S1/S2, No murmurs  Abdomen: Soft, Nontender, Nondistended; Bowel sounds present  Extremities: Right arm swelling/infiltration, No calf tenderness, lower extremity edema     LABS:                        11.0   11.98 )-----------( 224      ( 13 May 2020 07:16 )             33.0     05-13    140  |  106  |  21  ----------------------------<  161  3.3   |  27  |  0.71    Ca    7.6      13 May 2020 07:16  Phos  1.8     05-12  Mg     2.2     05-12      eGFR if Non African American: 97 mL/min/1.73M2 (05-13-20 @ 07:16)  eGFR if : 113 mL/min/1.73M2 (05-13-20 @ 07:16)    PTT - ( 11 May 2020 20:15 )  PTT:34.0 sec      POCT Blood Glucose.: 150 mg/dL (13 May 2020 16:23)  POCT Blood Glucose.: 151 mg/dL (13 May 2020 11:16)  POCT Blood Glucose.: 144 mg/dL (13 May 2020 06:13)  POCT Blood Glucose.: 101 mg/dL (12 May 2020 23:01)      Culture - Sputum (collected 08 May 2020 12:15)  Source: .Sputum Sputum  Gram Stain (08 May 2020 18:51):    Numerous polymorphonuclear leukocytes per low power field    Rare Squamous epithelial cells per low power field    Numerous Yeast like cells per oil power field  Final Report (10 May 2020 21:57):    Normal Respiratory Ankita present    Culture - Blood (collected 07 May 2020 19:00)  Source: .Blood Blood-Peripheral  Final Report (13 May 2020 01:00):    No Growth Final    Culture - Urine (collected 07 May 2020 14:33)  Source: .Urine Catheterized  Final Report (08 May 2020 16:38):    No growth    Culture - Blood (collected 07 May 2020 13:33)  Source: .Blood Blood-Peripheral  Final Report (12 May 2020 19:00):    No Growth Final        RADIOLOGY & ADDITIONAL TESTS:    Care Discussed with Consultants/Other Providers:

## 2020-05-13 NOTE — PROGRESS NOTE ADULT - ASSESSMENT
69M with bipolar disorder, hx TBI, HTN, COVID19, acute lower extremity DVT, returns to hospital with acute hypoxic respiratory failure in setting of septic shock due to complex pneumonia (HAP) and suspected PE    Pt swabbed for COVID due to need for NRB. Fever of 100.7 Pt moved to [facility].     Cousin and HCP Anel notified of status and plan     Acute hypoxic respiratory failure secondary to Suspected secondary to gram negative pneumonia   Suspected PE (not confirmed however patient already on full dose AC for DVT   -spO2 was falling below 90% this am on 6L NC, pt placed on NRB, spO2 94%  -Vanco held due to high vanco level. c/w Cefepime day #7 today  -s/p intubation, now breathing status is stable  -c/w full dose a/c, on heparin drip  	  #Right calf DVT (small)  - Eliquis via NGT  - pending speech for follow-up re: advancing diet    #ALLYSON: RESOLVED  -Resolved with IVF    Acute metabolic encephalopathy due to hypernatremia and Hypophosphatemia  - appears at baseline now  -Speech follow-up to advance diet    COVID19 by history: two neg swabs, although now with worsening O2 requirements and low grade fever, will reswab.     Normal pressure hydrocephalus:  shunt in place    Bipolar disorder: c/w Lamictal and Depakote     change to eliquis for DVT and suspected PE    Diet - NGT feeds, nutrition following

## 2020-05-14 LAB
ALBUMIN SERPL ELPH-MCNC: 1.3 G/DL — LOW (ref 3.3–5)
ALP SERPL-CCNC: 144 U/L — HIGH (ref 40–120)
ALT FLD-CCNC: 38 U/L — SIGNIFICANT CHANGE UP (ref 10–45)
ANION GAP SERPL CALC-SCNC: 6 MMOL/L — SIGNIFICANT CHANGE UP (ref 5–17)
AST SERPL-CCNC: 42 U/L — HIGH (ref 10–40)
BILIRUB SERPL-MCNC: 0.2 MG/DL — SIGNIFICANT CHANGE UP (ref 0.2–1.2)
BUN SERPL-MCNC: 32 MG/DL — HIGH (ref 7–23)
CALCIUM SERPL-MCNC: 8 MG/DL — LOW (ref 8.4–10.5)
CHLORIDE SERPL-SCNC: 108 MMOL/L — SIGNIFICANT CHANGE UP (ref 96–108)
CO2 SERPL-SCNC: 29 MMOL/L — SIGNIFICANT CHANGE UP (ref 22–31)
CREAT SERPL-MCNC: 0.76 MG/DL — SIGNIFICANT CHANGE UP (ref 0.5–1.3)
D DIMER BLD IA.RAPID-MCNC: 1738 NG/ML DDU — HIGH
FERRITIN SERPL-MCNC: 477 NG/ML — HIGH (ref 30–400)
GLUCOSE BLDC GLUCOMTR-MCNC: 101 MG/DL — HIGH (ref 70–99)
GLUCOSE BLDC GLUCOMTR-MCNC: 127 MG/DL — HIGH (ref 70–99)
GLUCOSE BLDC GLUCOMTR-MCNC: 129 MG/DL — HIGH (ref 70–99)
GLUCOSE BLDC GLUCOMTR-MCNC: 169 MG/DL — HIGH (ref 70–99)
GLUCOSE SERPL-MCNC: 132 MG/DL — HIGH (ref 70–99)
HCT VFR BLD CALC: 35.9 % — LOW (ref 39–50)
HGB BLD-MCNC: 11.7 G/DL — LOW (ref 13–17)
MAGNESIUM SERPL-MCNC: 2.2 MG/DL — SIGNIFICANT CHANGE UP (ref 1.6–2.6)
MCHC RBC-ENTMCNC: 30.9 PG — SIGNIFICANT CHANGE UP (ref 27–34)
MCHC RBC-ENTMCNC: 32.6 GM/DL — SIGNIFICANT CHANGE UP (ref 32–36)
MCV RBC AUTO: 94.7 FL — SIGNIFICANT CHANGE UP (ref 80–100)
NRBC # BLD: 0 /100 WBCS — SIGNIFICANT CHANGE UP (ref 0–0)
PHOSPHATE SERPL-MCNC: 2.9 MG/DL — SIGNIFICANT CHANGE UP (ref 2.5–4.5)
PLATELET # BLD AUTO: 278 K/UL — SIGNIFICANT CHANGE UP (ref 150–400)
POTASSIUM SERPL-MCNC: 3.5 MMOL/L — SIGNIFICANT CHANGE UP (ref 3.5–5.3)
POTASSIUM SERPL-SCNC: 3.5 MMOL/L — SIGNIFICANT CHANGE UP (ref 3.5–5.3)
PROCALCITONIN SERPL-MCNC: 0.39 NG/ML — HIGH
PROT SERPL-MCNC: 5 G/DL — LOW (ref 6–8.3)
RBC # BLD: 3.79 M/UL — LOW (ref 4.2–5.8)
RBC # FLD: 16.1 % — HIGH (ref 10.3–14.5)
SARS-COV-2 RNA SPEC QL NAA+PROBE: DETECTED
SODIUM SERPL-SCNC: 143 MMOL/L — SIGNIFICANT CHANGE UP (ref 135–145)
VANCOMYCIN FLD-MCNC: 4.2 UG/ML — SIGNIFICANT CHANGE UP
WBC # BLD: 14.23 K/UL — HIGH (ref 3.8–10.5)
WBC # FLD AUTO: 14.23 K/UL — HIGH (ref 3.8–10.5)

## 2020-05-14 PROCEDURE — 99233 SBSQ HOSP IP/OBS HIGH 50: CPT | Mod: CS

## 2020-05-14 PROCEDURE — 99232 SBSQ HOSP IP/OBS MODERATE 35: CPT

## 2020-05-14 RX ORDER — ALPRAZOLAM 0.25 MG
0.12 TABLET ORAL THREE TIMES A DAY
Refills: 0 | Status: DISCONTINUED | OUTPATIENT
Start: 2020-05-14 | End: 2020-05-15

## 2020-05-14 RX ADMIN — Medication 2: at 06:00

## 2020-05-14 RX ADMIN — LAMOTRIGINE 100 MILLIGRAM(S): 25 TABLET, ORALLY DISINTEGRATING ORAL at 18:28

## 2020-05-14 RX ADMIN — DIVALPROEX SODIUM 750 MILLIGRAM(S): 500 TABLET, DELAYED RELEASE ORAL at 18:28

## 2020-05-14 RX ADMIN — CHLORHEXIDINE GLUCONATE 1 APPLICATION(S): 213 SOLUTION TOPICAL at 05:59

## 2020-05-14 RX ADMIN — FAMOTIDINE 20 MILLIGRAM(S): 10 INJECTION INTRAVENOUS at 11:54

## 2020-05-14 RX ADMIN — Medication 0.12 MILLIGRAM(S): at 16:50

## 2020-05-14 RX ADMIN — CEFEPIME 100 MILLIGRAM(S): 1 INJECTION, POWDER, FOR SOLUTION INTRAMUSCULAR; INTRAVENOUS at 11:53

## 2020-05-14 RX ADMIN — LATANOPROST 1 DROP(S): 0.05 SOLUTION/ DROPS OPHTHALMIC; TOPICAL at 22:12

## 2020-05-14 RX ADMIN — DIVALPROEX SODIUM 750 MILLIGRAM(S): 500 TABLET, DELAYED RELEASE ORAL at 05:59

## 2020-05-14 RX ADMIN — APIXABAN 5 MILLIGRAM(S): 2.5 TABLET, FILM COATED ORAL at 16:51

## 2020-05-14 RX ADMIN — LAMOTRIGINE 100 MILLIGRAM(S): 25 TABLET, ORALLY DISINTEGRATING ORAL at 06:00

## 2020-05-14 RX ADMIN — Medication 0.12 MILLIGRAM(S): at 05:59

## 2020-05-14 RX ADMIN — Medication 0.12 MILLIGRAM(S): at 22:27

## 2020-05-14 RX ADMIN — APIXABAN 5 MILLIGRAM(S): 2.5 TABLET, FILM COATED ORAL at 05:59

## 2020-05-14 NOTE — PROGRESS NOTE ADULT - ASSESSMENT
A/P 70 yo M with PMHx of HTN, TBI, NPH with  shunt presented from NH for eval of hypoxia. Of note patient known to be COVID 19 positive 3 weeks ago. Per facility recent testing returned negative for COVID 19 infection.  Secondary to poor baseline mental status, increased work of breathing and hypoxia patient intubated emergent in the ED on 5/7. Was extubated on 5/9 .  Yesterday had fever and inc sob, re swabbed for Covid returned + today.     Acute hypoxic respiratory failure secondary to COVID-19 with suspected underlying gram negative pneumonia   #Suspected PE (not confirmed however patient already on full dose AC for known DVT)    - Patient admitted for HAP and suspected PE, had been tolerating nasal cannula and had negative COVID swab on 5/7 and 5/9.  - Yesterday developed fever, O2 requirements increased transferred to NRB mask. COVID PCR + on 5/13.  - today titrated O2 down to nasal cannula 2L with SpO2 100%-- will continue to monitor closely  - Had been on vancomycin, cefepime since 5/7. Vancomycin held since 5/12 for high trough.   - Will continue abx for now (day 8) - f/u procalcitonin, if decreased will DC off abx  - f/u inflammatory markers   - continue eliquis   - ID consult - recs  observe off antbx    	  #Right calf DVT (small)  - Eliquis via NGT    #ALLYSON: RESOLVED    #Acute metabolic encephalopathy due to hypernatremia and Hypophosphatemia  - appears at baseline now    #Normal pressure hydrocephalus:   -  shunt in place    #Bipolar disorder  - c/w Lamictal and Depakote     #DVT and suspected PE   - eliquis    #Diet - NGT feeds, nutrition following     Palliative : called and spoke to pts cousin Anel today , updated her on pts condition, , pt re-tested + for covid, reviewed ID consult . Pt more awake to me today, eyes open, and was tracking , but non verbal. Was on N/C when I saw him this AM.  Reviewed all w/ Anel , discussed pt will need to be re-evaluated by speech to test swallow function. She is aware and thankful for all the care and attention.  Will follow w/ med team, recs pending clinical course. Goc on going for alt means nutrition.

## 2020-05-14 NOTE — PROGRESS NOTE ADULT - ASSESSMENT
69M with bipolar disorder, hx TBI, HTN, COVID19, acute lower extremity DVT, returns to hospital with acute hypoxic respiratory failure in setting of septic shock due to complex pneumonia (HAP) and suspected PE    #Acute hypoxic respiratory failure secondary to COVID-19 with suspected underlying gram negative pneumonia   #Suspected PE (not confirmed however patient already on full dose AC for known DVT)    - Patient admitted for HAP and suspected PE, had been tolerating nasal cannula and had negative COVID swab on 5/7 and 5/9.  - Yesterday developed fever, O2 requirements increased and now on NRB mask. COVID PCR + on 5/13.  - Had been on vancomycin, cefepime since 5/7. Vancomycin held since 5/12 for high trough.   - Will continue abx for now (day 8) - f/u procalcitonin, if decreased will DC off abx  - f/u inflammatory markers   - continue eliquis   - ID consult  	  #Right calf DVT (small)  - Eliquis via NGT    #ALLYSON: RESOLVED    #Acute metabolic encephalopathy due to hypernatremia and Hypophosphatemia  - appears at baseline now  -Speech follow-up to advance diet    COVID19 by history: two neg swabs, although now with worsening O2 requirements and low grade fever, will reswab.     Normal pressure hydrocephalus:  shunt in place    Bipolar disorder: c/w Lamictal and Depakote     change to eliquis for DVT and suspected PE    Diet - NGT feeds, nutrition following 69M with bipolar disorder, hx TBI, HTN, COVID19, acute lower extremity DVT, returns to hospital with acute hypoxic respiratory failure in setting of septic shock due to complex pneumonia (HAP) and suspected PE    #Acute hypoxic respiratory failure secondary to COVID-19 with suspected underlying gram negative pneumonia   #Suspected PE (not confirmed however patient already on full dose AC for known DVT)    - Patient admitted for HAP and suspected PE, had been tolerating nasal cannula and had negative COVID swab on 5/7 and 5/9.  - Yesterday developed fever, O2 requirements increased transferred to NRB mask. COVID PCR + on 5/13.  - today titrated O2 down to nasal cannula 2L with SpO2 100%-- will continue to monitor closely  - Had been on vancomycin, cefepime since 5/7. Vancomycin held since 5/12 for high trough.   - Will continue abx for now (day 8) - f/u procalcitonin, if decreased will DC off abx  - f/u inflammatory markers   - continue eliquis   - ID consult  	  #Right calf DVT (small)  - Eliquis via NGT    #ALLYSON: RESOLVED    #Acute metabolic encephalopathy due to hypernatremia and Hypophosphatemia  - appears at baseline now    #Normal pressure hydrocephalus:   -  shunt in place    #Bipolar disorder  - c/w Lamictal and Depakote     #DVT and suspected PE   - eliquis    #Diet - NGT feeds, nutrition following 69M with bipolar disorder, hx TBI, HTN, COVID19, acute lower extremity DVT, returns to hospital with acute hypoxic respiratory failure in setting of septic shock due to complex pneumonia (HAP) and suspected PE    #Acute hypoxic respiratory failure secondary to COVID-19 with suspected underlying gram negative pneumonia   #Suspected PE (not confirmed however patient already on full dose AC for known DVT)    - Patient admitted for HAP and suspected PE, had been tolerating nasal cannula and had negative COVID swab on 5/7 and 5/9.  - Yesterday developed fever, O2 requirements increased transferred to NRB mask. COVID PCR + on 5/13.  - today titrated O2 down to nasal cannula 2L with SpO2 100%-- will continue to monitor closely  - Had been on vancomycin, cefepime since 5/7. Vancomycin held since 5/12 for high trough.   - Will continue abx for now (day 8) - f/u procalcitonin, if decreased will DC off abx  - f/u inflammatory markers   - continue eliquis   - ID consult  	  #Right calf DVT (small)  - Eliquis via NGT    #ALLYSON: RESOLVED    #Acute metabolic encephalopathy due to hypernatremia and Hypophosphatemia  - appears at baseline now    #Normal pressure hydrocephalus:   -  shunt in place    #Bipolar disorder  - c/w Lamictal and Depakote     #DVT and suspected PE   - eliquis    #Diet - NGT feeds, nutrition following     5/14: Spoke with patient's HCP and cousin Anel to give update

## 2020-05-14 NOTE — PROGRESS NOTE ADULT - SUBJECTIVE AND OBJECTIVE BOX
Patient is a 69y old  Male who presents with a chief complaint of hypoxic respiratory failure (13 May 2020 19:02)    Patient seen and examined at bedside. Patient appears comfortable, SpO2 is 96% on NRB mask     ALLERGIES:  No Known Allergies    MEDICATIONS  (STANDING):  ALPRAZolam 0.125 milliGRAM(s) Oral three times a day  apixaban 5 milliGRAM(s) Oral every 12 hours  cefepime   IVPB 2000 milliGRAM(s) IV Intermittent <User Schedule>  chlorhexidine 4% Liquid 1 Application(s) Topical <User Schedule>  dextrose 5%. 1000 milliLiter(s) (50 mL/Hr) IV Continuous <Continuous>  dextrose 50% Injectable 12.5 Gram(s) IV Push once  dextrose 50% Injectable 25 Gram(s) IV Push once  dextrose 50% Injectable 25 Gram(s) IV Push once  diVALproex Sprinkle 750 milliGRAM(s) Oral every 12 hours  famotidine    Tablet 20 milliGRAM(s) Oral daily  insulin lispro (HumaLOG) corrective regimen sliding scale   SubCutaneous every 6 hours  lamoTRIgine 100 milliGRAM(s) Oral two times a day  latanoprost 0.005% Ophthalmic Solution 1 Drop(s) Both EYES at bedtime    MEDICATIONS  (PRN):  acetaminophen   Tablet .. 975 milliGRAM(s) Oral every 6 hours PRN Temp greater or equal to 38C (100.4F)  acetaminophen  Suppository .. 650 milliGRAM(s) Rectal every 6 hours PRN Temp greater or equal to 38C (100.4F), Moderate Pain (4 - 6), Severe Pain (7 - 10)  dextrose 40% Gel 15 Gram(s) Oral once PRN Blood Glucose LESS THAN 70 milliGRAM(s)/deciliter    Vital Signs Last 24 Hrs  T(F): 99.7 (14 May 2020 05:19), Max: 99.7 (14 May 2020 05:19)  HR: 93 (14 May 2020 05:19) (87 - 93)  BP: 111/67 (14 May 2020 05:19) (111/67 - 139/70)  RR: 16 (14 May 2020 05:19) (16 - 18)  SpO2: 96% (14 May 2020 05:19) (96% - 97%)    I&O's Summary  13 May 2020 07:01  -  14 May 2020 07:00  --------------------------------------------------------  IN: 900 mL / OUT: 800 mL / NET: 100 mL    Vital Signs Last 24 Hrs  T(F): 99.7 (14 May 2020 05:19), Max: 99.7 (14 May 2020 05:19)  HR: 93 (14 May 2020 05:19) (87 - 93)  BP: 111/67 (14 May 2020 05:19) (111/67 - 139/70)  RR: 16 (14 May 2020 05:19) (16 - 18)  SpO2: 96% (14 May 2020 05:19) (96% - 97%)    PHYSICAL EXAM:  GENERAL: NAD   HEAD:  Atraumatic, Normocephalic  ENMT: dry mucous membranes, NG tube in place  NECK: Supple, No JVD  CHEST/LUNG: Clear to auscultation bilaterally, good air entry, non-labored breathing  HEART: RRR; +S1/S2  ABDOMEN: Soft, Nontender, Nondistended; Bowel sounds present  VASCULAR: Normal pulses, Normal capillary refill, No LE edema   EXTREMITIES: No calf tenderness, No cyanosis  SKIN: Warm, Intact  NERVOUS SYSTEM:  Awake, nonverbal, not following commands.     LABS:                        11.7   14.23 )-----------( 278      ( 14 May 2020 06:56 )             35.9     05-13    140  |  106  |  21  ----------------------------<  161  3.3   |  27  |  0.71    Ca    7.6      13 May 2020 07:16  Phos  1.8     05-12  Mg     2.2     05-12    eGFR if Non African American: 97 mL/min/1.73M2 (05-13-20 @ 07:16)  eGFR if : 113 mL/min/1.73M2 (05-13-20 @ 07:16)    PTT - ( 11 May 2020 20:15 )  PTT:34.0 sec    POCT Blood Glucose.: 169 mg/dL (14 May 2020 05:23)  POCT Blood Glucose.: 121 mg/dL (13 May 2020 22:45)  POCT Blood Glucose.: 150 mg/dL (13 May 2020 16:23)  POCT Blood Glucose.: 151 mg/dL (13 May 2020 11:16)    Culture - Sputum (collected 08 May 2020 12:15)  Source: .Sputum Sputum  Gram Stain (08 May 2020 18:51):    Numerous polymorphonuclear leukocytes per low power field    Rare Squamous epithelial cells per low power field    Numerous Yeast like cells per oil power field  Final Report (10 May 2020 21:57):    Normal Respiratory Ankita present    Culture - Blood (collected 07 May 2020 19:00)  Source: .Blood Blood-Peripheral  Final Report (13 May 2020 01:00):    No Growth Final    Culture - Urine (collected 07 May 2020 14:33)  Source: .Urine Catheterized  Final Report (08 May 2020 16:38):    No growth    Culture - Blood (collected 07 May 2020 13:33)  Source: .Blood Blood-Peripheral  Final Report (12 May 2020 19:00):    No Growth Final      RADIOLOGY & ADDITIONAL TESTS:    < from: Xray Chest 1 View-PORTABLE IMMEDIATE (05.13.20 @ 08:48) >    EXAM:  XR CHEST PORTABLE IMMED 1V      PROCEDURE DATE:  05/13/2020        INTERPRETATION:    Examination: XR CHEST IMMEDIATE    History: ADMDIAG1: J96.01 ACUTE RESPIRATORY FAILURE WITH HYPOXIA/, Abnormal Chest Sounds    Portable chest x-ray is compared to a previous examination dated 5/12/2020.    Impression: Stable NG tube. Stable right  shunt. Stable orthopedic hardware at the upper thoracic spine.    No evidence for pleural effusion or pneumothorax.    Grossly stable airspace opacifications in both lungs.    Stable cardiac silhouette.     Stable gaseous dilatation of the splenic flexure.      DISCRETE X-RAY DATA:  Percent of LEFT lung opacification: 34-66%  Percent of RIGHT lung opacification: 34-66%  Change in lung opacification from most recent x-ray (<=3 days): Stable  Change from prior dated 3 or more days (same admission): Increase    < end of copied text >      Care Discussed with Consultants/Other Providers: Patient is a 69y old  Male who presents with a chief complaint of hypoxic respiratory failure (13 May 2020 19:02)    Patient seen and examined at bedside. Patient appears comfortable, SpO2 is 96% on NRB mask -- titrated down to 2L nasal cannula with SpO2 100%.     ALLERGIES:  No Known Allergies    MEDICATIONS  (STANDING):  ALPRAZolam 0.125 milliGRAM(s) Oral three times a day  apixaban 5 milliGRAM(s) Oral every 12 hours  cefepime   IVPB 2000 milliGRAM(s) IV Intermittent <User Schedule>  chlorhexidine 4% Liquid 1 Application(s) Topical <User Schedule>  dextrose 5%. 1000 milliLiter(s) (50 mL/Hr) IV Continuous <Continuous>  dextrose 50% Injectable 12.5 Gram(s) IV Push once  dextrose 50% Injectable 25 Gram(s) IV Push once  dextrose 50% Injectable 25 Gram(s) IV Push once  diVALproex Sprinkle 750 milliGRAM(s) Oral every 12 hours  famotidine    Tablet 20 milliGRAM(s) Oral daily  insulin lispro (HumaLOG) corrective regimen sliding scale   SubCutaneous every 6 hours  lamoTRIgine 100 milliGRAM(s) Oral two times a day  latanoprost 0.005% Ophthalmic Solution 1 Drop(s) Both EYES at bedtime    MEDICATIONS  (PRN):  acetaminophen   Tablet .. 975 milliGRAM(s) Oral every 6 hours PRN Temp greater or equal to 38C (100.4F)  acetaminophen  Suppository .. 650 milliGRAM(s) Rectal every 6 hours PRN Temp greater or equal to 38C (100.4F), Moderate Pain (4 - 6), Severe Pain (7 - 10)  dextrose 40% Gel 15 Gram(s) Oral once PRN Blood Glucose LESS THAN 70 milliGRAM(s)/deciliter    Vital Signs Last 24 Hrs  T(F): 99.7 (14 May 2020 05:19), Max: 99.7 (14 May 2020 05:19)  HR: 93 (14 May 2020 05:19) (87 - 93)  BP: 111/67 (14 May 2020 05:19) (111/67 - 139/70)  RR: 16 (14 May 2020 05:19) (16 - 18)  SpO2: 96% (14 May 2020 05:19) (96% - 97%)    I&O's Summary  13 May 2020 07:01  -  14 May 2020 07:00  --------------------------------------------------------  IN: 900 mL / OUT: 800 mL / NET: 100 mL    Vital Signs Last 24 Hrs  T(F): 99.7 (14 May 2020 05:19), Max: 99.7 (14 May 2020 05:19)  HR: 93 (14 May 2020 05:19) (87 - 93)  BP: 111/67 (14 May 2020 05:19) (111/67 - 139/70)  RR: 16 (14 May 2020 05:19) (16 - 18)  SpO2: 96% (14 May 2020 05:19) (96% - 97%)    PHYSICAL EXAM:  GENERAL: NAD   HEAD:  Atraumatic, Normocephalic  ENMT: dry mucous membranes, NG tube in place  NECK: Supple, No JVD  CHEST/LUNG: Clear to auscultation bilaterally, good air entry, non-labored breathing  HEART: RRR; +S1/S2  ABDOMEN: Soft, Nontender, Nondistended; Bowel sounds present  VASCULAR: Normal pulses, Normal capillary refill, No LE edema   EXTREMITIES: No calf tenderness, No cyanosis  SKIN: Warm, Intact  NERVOUS SYSTEM:  Awake, nonverbal, not following commands.     LABS:                        11.7   14.23 )-----------( 278      ( 14 May 2020 06:56 )             35.9     05-13    140  |  106  |  21  ----------------------------<  161  3.3   |  27  |  0.71    Ca    7.6      13 May 2020 07:16  Phos  1.8     05-12  Mg     2.2     05-12    eGFR if Non African American: 97 mL/min/1.73M2 (05-13-20 @ 07:16)  eGFR if : 113 mL/min/1.73M2 (05-13-20 @ 07:16)    PTT - ( 11 May 2020 20:15 )  PTT:34.0 sec    POCT Blood Glucose.: 169 mg/dL (14 May 2020 05:23)  POCT Blood Glucose.: 121 mg/dL (13 May 2020 22:45)  POCT Blood Glucose.: 150 mg/dL (13 May 2020 16:23)  POCT Blood Glucose.: 151 mg/dL (13 May 2020 11:16)    Culture - Sputum (collected 08 May 2020 12:15)  Source: .Sputum Sputum  Gram Stain (08 May 2020 18:51):    Numerous polymorphonuclear leukocytes per low power field    Rare Squamous epithelial cells per low power field    Numerous Yeast like cells per oil power field  Final Report (10 May 2020 21:57):    Normal Respiratory Ankita present    Culture - Blood (collected 07 May 2020 19:00)  Source: .Blood Blood-Peripheral  Final Report (13 May 2020 01:00):    No Growth Final    Culture - Urine (collected 07 May 2020 14:33)  Source: .Urine Catheterized  Final Report (08 May 2020 16:38):    No growth    Culture - Blood (collected 07 May 2020 13:33)  Source: .Blood Blood-Peripheral  Final Report (12 May 2020 19:00):    No Growth Final      RADIOLOGY & ADDITIONAL TESTS:    < from: Xray Chest 1 View-PORTABLE IMMEDIATE (05.13.20 @ 08:48) >    EXAM:  XR CHEST PORTABLE IMMED 1V      PROCEDURE DATE:  05/13/2020        INTERPRETATION:    Examination: XR CHEST IMMEDIATE    History: ADMDIAG1: J96.01 ACUTE RESPIRATORY FAILURE WITH HYPOXIA/, Abnormal Chest Sounds    Portable chest x-ray is compared to a previous examination dated 5/12/2020.    Impression: Stable NG tube. Stable right  shunt. Stable orthopedic hardware at the upper thoracic spine.    No evidence for pleural effusion or pneumothorax.    Grossly stable airspace opacifications in both lungs.    Stable cardiac silhouette.     Stable gaseous dilatation of the splenic flexure.      DISCRETE X-RAY DATA:  Percent of LEFT lung opacification: 34-66%  Percent of RIGHT lung opacification: 34-66%  Change in lung opacification from most recent x-ray (<=3 days): Stable  Change from prior dated 3 or more days (same admission): Increase    < end of copied text >      Care Discussed with Consultants/Other Providers:

## 2020-05-14 NOTE — PROGRESS NOTE ADULT - SUBJECTIVE AND OBJECTIVE BOX
Progress: more awake today , on N/C  this morning , tested covid + again.     Present Symptoms:   Dyspnea:   Nausea/Vomiting:   Anxiety:    Depressed Mood:   Fatigue:   Loss of appetite:   Pain:     no s/s              location:   Review of Systems:  Unable to obtain due to poor mentation]    MEDICATIONS  (STANDING):  ALPRAZolam 0.125 milliGRAM(s) Oral three times a day  apixaban 5 milliGRAM(s) Oral every 12 hours  dextrose 5%. 1000 milliLiter(s) (50 mL/Hr) IV Continuous <Continuous>  dextrose 50% Injectable 12.5 Gram(s) IV Push once  dextrose 50% Injectable 25 Gram(s) IV Push once  dextrose 50% Injectable 25 Gram(s) IV Push once  diVALproex Sprinkle 750 milliGRAM(s) Oral every 12 hours  famotidine    Tablet 20 milliGRAM(s) Oral daily  insulin lispro (HumaLOG) corrective regimen sliding scale   SubCutaneous every 6 hours  lamoTRIgine 100 milliGRAM(s) Oral two times a day  latanoprost 0.005% Ophthalmic Solution 1 Drop(s) Both EYES at bedtime    MEDICATIONS  (PRN):  acetaminophen   Tablet .. 975 milliGRAM(s) Oral every 6 hours PRN Temp greater or equal to 38C (100.4F)  acetaminophen  Suppository .. 650 milliGRAM(s) Rectal every 6 hours PRN Temp greater or equal to 38C (100.4F), Moderate Pain (4 - 6), Severe Pain (7 - 10)  dextrose 40% Gel 15 Gram(s) Oral once PRN Blood Glucose LESS THAN 70 milliGRAM(s)/deciliter      PHYSICAL EXAM:  Vital Signs Last 24 Hrs  T(C): 37.6 (14 May 2020 05:19), Max: 37.6 (14 May 2020 05:19)  T(F): 99.7 (14 May 2020 05:19), Max: 99.7 (14 May 2020 05:19)  HR: 93 (14 May 2020 05:19) (87 - 93)  BP: 111/67 (14 May 2020 05:19) (111/67 - 139/70)  BP(mean): --  RR: 16 (14 May 2020 05:19) (16 - 18)  SpO2: 100% (14 May 2020 11:00) (96% - 100%)  General: alert , eyes open , non verbal       Diet: NPO, NG feeds     LABS:                          11.7   14.23 )-----------( 278      ( 14 May 2020 06:56 )             35.9     05-14    143  |  108  |  32<H>  ----------------------------<  132<H>  3.5   |  29  |  0.76    Ca    8.0<L>      14 May 2020 11:14  Phos  2.9     05-14  Mg     2.2     05-14    TPro  5.0<L>  /  Alb  1.3<L>  /  TBili  0.2  /  DBili  x   /  AST  42<H>  /  ALT  38  /  AlkPhos  144<H>  05-14        RADIOLOGY & ADDITIONAL STUDIES:< from: Xray Chest 1 View-PORTABLE IMMEDIATE (05.13.20 @ 08:48) >  EXAM:  XR CHEST PORTABLE IMMED 1V      PROCEDURE DATE:  05/13/2020        INTERPRETATION:    Examination: XR CHEST IMMEDIATE    History: ADMDIAG1: J96.01 ACUTE RESPIRATORY FAILURE WITH HYPOXIA/, Abnormal Chest Sounds    Portable chest x-ray is compared to a previous examination dated 5/12/2020.    Impression: Stable NG tube. Stable right  shunt. Stable orthopedic hardware at the upper thoracic spine.    No evidence for pleural effusion or pneumothorax.    Grossly stable airspace opacifications in both lungs.    Stable cardiac silhouette.     Stable gaseous dilatation of the splenic flexure.      DISCRETE X-RAY DATA:  Percent of LEFT lung opacification: 34-66%  Percent of RIGHT lung opacification: 34-66%  Change in lung opacification from most recent x-ray (<=3 days): Stable  Change from prior dated 3 or more days (same admission): Increase        ADVANCE DIRECTIVES: MOLST  DNR/DNI   Advanced Care Planning discussion total time spent:

## 2020-05-14 NOTE — CONSULT NOTE ADULT - ASSESSMENT
68 yo male with recent COVID at his SNF(?1 month ago), TBI with VPS who was admitted to [facility] on 5/7 with fever, leukocytosis and hypoxia.  His baseline is non verbal and he appears to be an aspiration risk.  His positive COVID PCR after 2 negative specimens likely reflects non viable RNA and not a new infection.  Suspect a component of aspiration.He has been on eliquis for anticoagulation.  Even with a low grade fever yesterday I think it is reasonable to stop antibiotics.  His PC level is down to .4 from 1.2.his TTE reveals good LV function.His hypoxia in part may still reflect COVID induced lung disease.  Suggest:  1.D/C cefepime, s/p 7 days  2.Observe off antibiotics.  3.Anticoagulation per medicine, ? If w/u needed to exclude PE  4.Follow exam, temps, and routine labs

## 2020-05-14 NOTE — CHART NOTE - NSCHARTNOTEFT_GEN_A_CORE
NUTRITION FOLLOW UP    SOURCE: Patient [)   Family [ ]     other [X   RN    DIET: NGT FEEDS NEPRO 50 CC/HR. providing approx. 2160 calories/97 gms. protein    PATIENT REPORT[ ] nausea  [ ] vomiting [ ] diarrhea [ ] constipation  [ ]chewing problems [X ] swallowing issues  [ ] other: no GI distress    PO INTAKE:  < 50% [ ]   50-75%  [ ]   %  [X]  other : NGT FEEDS ( family is deciding on PEG tube) tolerates NGT feeds well    SOURCE: for PO intake [] Patient [ ] family [ ] chart [ X] staff [ ] other    ENTERAL/PARENTERAL NUTRITION: n/a    CURRENT WEIGHT: Weight (kg): 68 (05-09 @ 10:04)   5/14 65.3 KG. (loss of 3 kg.)    PERTINENT LABS:  Date: 14 May 2020 06:56  Hemoglobin 11.7   Hematocrit 35.9     Date: 05-13  Sodium 140  Potassium 3.3<L>  Glucose Serum 161<H>  BUN 21      Creatinine    ACCUCHECK  POCT Blood Glucose.: 169 mg/dL (14 May 2020 05:23)  POCT Blood Glucose.: 121 mg/dL (13 May 2020 22:45)  POCT Blood Glucose.: 150 mg/dL (13 May 2020 16:23)  POCT Blood Glucose.: 151 mg/dL (13 May 2020 11:16)      SKIN: susp. DTI cocyxx, stage 2 left buttock, edema B/L arms, 5/13 last BM , severe malnutrition noted    ESTIMATED NEEDS:   [X] no change since previous assessment  [ ] recalculated:     PREVIOUS NUTRITION DIAGNOSIS: addressed    NUTRITION DIAGNOSIS is   [X] ongoing    NEW NUTRITION DIAGNOSIS: [X] not applicable    MONITORING AND EVALUATION:   Current diet order is appropriate and is well tolerated, but will monitor for any changes that may be needed     [X] Tolerance to diet prescription [X] weights [X] follow up per protocol    NUTRITION RECOMMENDATIONS:    RD remains available JANE Vanegas RD, CDE

## 2020-05-15 LAB
ALBUMIN SERPL ELPH-MCNC: 1.2 G/DL — LOW (ref 3.3–5)
ALP SERPL-CCNC: 136 U/L — HIGH (ref 40–120)
ALT FLD-CCNC: 38 U/L — SIGNIFICANT CHANGE UP (ref 10–45)
ANION GAP SERPL CALC-SCNC: 8 MMOL/L — SIGNIFICANT CHANGE UP (ref 5–17)
AST SERPL-CCNC: 39 U/L — SIGNIFICANT CHANGE UP (ref 10–40)
BILIRUB SERPL-MCNC: 0.2 MG/DL — SIGNIFICANT CHANGE UP (ref 0.2–1.2)
BUN SERPL-MCNC: 35 MG/DL — HIGH (ref 7–23)
CALCIUM SERPL-MCNC: 8.1 MG/DL — LOW (ref 8.4–10.5)
CHLORIDE SERPL-SCNC: 107 MMOL/L — SIGNIFICANT CHANGE UP (ref 96–108)
CO2 SERPL-SCNC: 26 MMOL/L — SIGNIFICANT CHANGE UP (ref 22–31)
CREAT SERPL-MCNC: 0.7 MG/DL — SIGNIFICANT CHANGE UP (ref 0.5–1.3)
CRP SERPL-MCNC: 10.24 MG/DL — HIGH (ref 0–0.4)
GLUCOSE BLDC GLUCOMTR-MCNC: 132 MG/DL — HIGH (ref 70–99)
GLUCOSE BLDC GLUCOMTR-MCNC: 140 MG/DL — HIGH (ref 70–99)
GLUCOSE SERPL-MCNC: 115 MG/DL — HIGH (ref 70–99)
HCT VFR BLD CALC: 32.9 % — LOW (ref 39–50)
HGB BLD-MCNC: 10.8 G/DL — LOW (ref 13–17)
LDH SERPL L TO P-CCNC: 339 U/L — HIGH (ref 50–242)
MCHC RBC-ENTMCNC: 31.6 PG — SIGNIFICANT CHANGE UP (ref 27–34)
MCHC RBC-ENTMCNC: 32.8 GM/DL — SIGNIFICANT CHANGE UP (ref 32–36)
MCV RBC AUTO: 96.2 FL — SIGNIFICANT CHANGE UP (ref 80–100)
NRBC # BLD: 0 /100 WBCS — SIGNIFICANT CHANGE UP (ref 0–0)
PLATELET # BLD AUTO: 191 K/UL — SIGNIFICANT CHANGE UP (ref 150–400)
POTASSIUM SERPL-MCNC: 3.8 MMOL/L — SIGNIFICANT CHANGE UP (ref 3.5–5.3)
POTASSIUM SERPL-SCNC: 3.8 MMOL/L — SIGNIFICANT CHANGE UP (ref 3.5–5.3)
PROT SERPL-MCNC: 4.9 G/DL — LOW (ref 6–8.3)
RBC # BLD: 3.42 M/UL — LOW (ref 4.2–5.8)
RBC # FLD: 16.2 % — HIGH (ref 10.3–14.5)
SODIUM SERPL-SCNC: 141 MMOL/L — SIGNIFICANT CHANGE UP (ref 135–145)
WBC # BLD: 16.03 K/UL — HIGH (ref 3.8–10.5)
WBC # FLD AUTO: 16.03 K/UL — HIGH (ref 3.8–10.5)

## 2020-05-15 PROCEDURE — 71045 X-RAY EXAM CHEST 1 VIEW: CPT | Mod: 26

## 2020-05-15 PROCEDURE — 99233 SBSQ HOSP IP/OBS HIGH 50: CPT | Mod: CS,GC

## 2020-05-15 RX ORDER — MORPHINE SULFATE 50 MG/1
2 CAPSULE, EXTENDED RELEASE ORAL EVERY 4 HOURS
Refills: 0 | Status: DISCONTINUED | OUTPATIENT
Start: 2020-05-15 | End: 2020-05-15

## 2020-05-15 RX ORDER — VALPROIC ACID (AS SODIUM SALT) 250 MG/5ML
750 SOLUTION, ORAL ORAL EVERY 12 HOURS
Refills: 0 | Status: DISCONTINUED | OUTPATIENT
Start: 2020-05-15 | End: 2020-05-18

## 2020-05-15 RX ORDER — MORPHINE SULFATE 50 MG/1
2 CAPSULE, EXTENDED RELEASE ORAL ONCE
Refills: 0 | Status: DISCONTINUED | OUTPATIENT
Start: 2020-05-15 | End: 2020-05-15

## 2020-05-15 RX ORDER — MORPHINE SULFATE 50 MG/1
1 CAPSULE, EXTENDED RELEASE ORAL
Qty: 50 | Refills: 0 | Status: DISCONTINUED | OUTPATIENT
Start: 2020-05-15 | End: 2020-05-18

## 2020-05-15 RX ADMIN — APIXABAN 5 MILLIGRAM(S): 2.5 TABLET, FILM COATED ORAL at 05:24

## 2020-05-15 RX ADMIN — MORPHINE SULFATE 2 MILLIGRAM(S): 50 CAPSULE, EXTENDED RELEASE ORAL at 14:31

## 2020-05-15 RX ADMIN — Medication 0.12 MILLIGRAM(S): at 05:29

## 2020-05-15 RX ADMIN — DIVALPROEX SODIUM 750 MILLIGRAM(S): 500 TABLET, DELAYED RELEASE ORAL at 05:24

## 2020-05-15 RX ADMIN — MORPHINE SULFATE 1 MG/HR: 50 CAPSULE, EXTENDED RELEASE ORAL at 15:05

## 2020-05-15 RX ADMIN — Medication 28.75 MILLIGRAM(S): at 16:49

## 2020-05-15 RX ADMIN — LAMOTRIGINE 100 MILLIGRAM(S): 25 TABLET, ORALLY DISINTEGRATING ORAL at 05:24

## 2020-05-15 NOTE — PROGRESS NOTE ADULT - ASSESSMENT
A/P  68 yo M with PMHx of HTN, TBI, NPH with  shunt presented from NH for eval of hypoxia. Of note patient known to be COVID 19 positive 3 weeks ago. Per facility recent testing returned negative for COVID 19 infection.  Secondary to poor baseline mental status, increased work of breathing and hypoxia patient intubated emergent in the ED on 5/7. Was extubated on 5/9 .  Yesterday had fever and inc sob, re swabbed for Covid returned +. Today more sob, lethargic, less responsive, and requiring non rebreather  mask today .       Acute hypoxic respiratory failure secondary to COVID-19 with suspected underlying gram negative pneumonia   #Suspected PE (not confirmed however patient already on full dose AC for known DVT)    - Patient admitted for HAP and suspected PE, had been tolerating nasal cannula and had negative COVID swab on 5/7 and 5/9.  - 5/13 developed fever, O2 requirements increased transferred to NRB mask. COVID PCR + on 5/13.  - was titrated O2 down to nasal cannula 2L with SpO2 100%-- today desaturated , needs non rebreather mask   - Had been on vancomycin, cefepime since 5/7. Vancomycin held since 5/12 for high trough.   - Will continue abx for now (day 8) - f/u procalcitonin, if decreased will DC off abx  - f/u inflammatory markers   - continue eliquis   - ID consult - recs  observe off antbx    	  #Right calf DVT (small)  - Eliquis via NGT    #ALLYSON: RESOLVED    #Acute metabolic encephalopathy due to hypernatremia and Hypophosphatemia  - appears worse today   #Normal pressure hydrocephalus:   -  shunt in place    #Bipolar disorder  - c/w Lamictal and Depakote     #DVT and suspected PE   - eliquis    #Diet - NGT feeds, nutrition following , pt too lethargic to re-test    Palliative :  f/u goc , pt desaturated today and became more lethargic and  dyspneic ,requiring use of  non rebreather mask.  Case reviewed w/ med team today, I called and spoke to mell Tam this afternoon, she said she was updated by Dr Rivera today ,they discussed pts change in status today and they reviewed comfort care measures. Anel told me she agrees with comfort care, at this point,  she does not want him to suffer. She was agreeable to use of low dose MS for his inc. SOB. As discussed med team, pt may become appropriate  for hospice services.   Will cont to follow w/ med team.

## 2020-05-15 NOTE — PROGRESS NOTE ADULT - SUBJECTIVE AND OBJECTIVE BOX
Patient is a 69y old  Male who presents with a chief complaint of hypoxic respiratory failure (14 May 2020 15:59)    Patient seen and examined at bedside. Patient appears comfortable, though tachypneic to 26 BPM. SpO2 97% on room air    ALLERGIES:  No Known Allergies    MEDICATIONS  (STANDING):  ALPRAZolam 0.125 milliGRAM(s) Oral three times a day  apixaban 5 milliGRAM(s) Oral every 12 hours  dextrose 5%. 1000 milliLiter(s) (50 mL/Hr) IV Continuous <Continuous>  dextrose 50% Injectable 12.5 Gram(s) IV Push once  dextrose 50% Injectable 25 Gram(s) IV Push once  dextrose 50% Injectable 25 Gram(s) IV Push once  diVALproex Sprinkle 750 milliGRAM(s) Oral every 12 hours  famotidine    Tablet 20 milliGRAM(s) Oral daily  insulin lispro (HumaLOG) corrective regimen sliding scale   SubCutaneous every 6 hours  lamoTRIgine 100 milliGRAM(s) Oral two times a day  latanoprost 0.005% Ophthalmic Solution 1 Drop(s) Both EYES at bedtime    MEDICATIONS  (PRN):  acetaminophen   Tablet .. 975 milliGRAM(s) Oral every 6 hours PRN Temp greater or equal to 38C (100.4F)  acetaminophen  Suppository .. 650 milliGRAM(s) Rectal every 6 hours PRN Temp greater or equal to 38C (100.4F), Moderate Pain (4 - 6), Severe Pain (7 - 10)  dextrose 40% Gel 15 Gram(s) Oral once PRN Blood Glucose LESS THAN 70 milliGRAM(s)/deciliter    Vital Signs Last 24 Hrs  T(F): 99.1 (15 May 2020 05:20), Max: 99.1 (15 May 2020 05:20)  HR: 88 (15 May 2020 05:20) (83 - 88)  BP: 179/80 (15 May 2020 05:20) (123/71 - 179/80)  RR: 17 (15 May 2020 05:20) (16 - 17)  SpO2: 97% (15 May 2020 05:20) (91% - 100%)    I&O's Summary  14 May 2020 07:01  -  15 May 2020 07:00  --------------------------------------------------------  IN: 4320 mL / OUT: 1400 mL / NET: 2920 mL    PHYSICAL EXAM:  GENERAL: NAD   HEAD:  Atraumatic, Normocephalic  ENMT: dry mucous membranes, NG tube in place  NECK: Supple, No JVD  CHEST/LUNG: Clear to auscultation bilaterally, good air entry, mildly-labored breathing, tachypneic   HEART: RRR; +S1/S2  ABDOMEN: Soft, Nontender, Nondistended; Bowel sounds present  VASCULAR: Normal pulses, Normal capillary refill, No LE edema. Bilateral upper extremity edema  EXTREMITIES: No calf tenderness, No cyanosis  SKIN: Warm, pressure ulcers to sacrum, buttock, heel  NERVOUS SYSTEM:  Awake, nonverbal, not following commands.     LABS:                        10.8   16.03 )-----------( 191      ( 15 May 2020 06:27 )             32.9     05-14    143  |  108  |  32  ----------------------------<  132  3.5   |  29  |  0.76    Ca    8.0      14 May 2020 11:14  Phos  2.9     05-14  Mg     2.2     05-14    TPro  5.0  /  Alb  1.3  /  TBili  0.2  /  DBili  x   /  AST  42  /  ALT  38  /  AlkPhos  144  05-14    eGFR if : 109 mL/min/1.73M2 (05-14-20 @ 11:14)  eGFR if Non African American: 94 mL/min/1.73M2 (05-14-20 @ 11:14)    POCT Blood Glucose.: 132 mg/dL (15 May 2020 05:22)  POCT Blood Glucose.: 101 mg/dL (14 May 2020 22:13)  POCT Blood Glucose.: 127 mg/dL (14 May 2020 17:39)  POCT Blood Glucose.: 129 mg/dL (14 May 2020 11:50)    Culture - Sputum (collected 08 May 2020 12:15)  Source: .Sputum Sputum  Gram Stain (08 May 2020 18:51):    Numerous polymorphonuclear leukocytes per low power field    Rare Squamous epithelial cells per low power field    Numerous Yeast like cells per oil power field  Final Report (10 May 2020 21:57):    Normal Respiratory Ankita present    RADIOLOGY & ADDITIONAL TESTS:    Care Discussed with Consultants/Other Providers: Patient is a 69y old  Male who presents with a chief complaint of hypoxic respiratory failure (14 May 2020 15:59)    Patient seen and examined at bedside. Patient appears comfortable, though tachypneic to 26 BPM. SpO2 81% on room air, improved to 94% on NRB mask    ALLERGIES:  No Known Allergies    MEDICATIONS  (STANDING):  ALPRAZolam 0.125 milliGRAM(s) Oral three times a day  apixaban 5 milliGRAM(s) Oral every 12 hours  dextrose 5%. 1000 milliLiter(s) (50 mL/Hr) IV Continuous <Continuous>  dextrose 50% Injectable 12.5 Gram(s) IV Push once  dextrose 50% Injectable 25 Gram(s) IV Push once  dextrose 50% Injectable 25 Gram(s) IV Push once  diVALproex Sprinkle 750 milliGRAM(s) Oral every 12 hours  famotidine    Tablet 20 milliGRAM(s) Oral daily  insulin lispro (HumaLOG) corrective regimen sliding scale   SubCutaneous every 6 hours  lamoTRIgine 100 milliGRAM(s) Oral two times a day  latanoprost 0.005% Ophthalmic Solution 1 Drop(s) Both EYES at bedtime    MEDICATIONS  (PRN):  acetaminophen   Tablet .. 975 milliGRAM(s) Oral every 6 hours PRN Temp greater or equal to 38C (100.4F)  acetaminophen  Suppository .. 650 milliGRAM(s) Rectal every 6 hours PRN Temp greater or equal to 38C (100.4F), Moderate Pain (4 - 6), Severe Pain (7 - 10)  dextrose 40% Gel 15 Gram(s) Oral once PRN Blood Glucose LESS THAN 70 milliGRAM(s)/deciliter    Vital Signs Last 24 Hrs  T(F): 99.1 (15 May 2020 05:20), Max: 99.1 (15 May 2020 05:20)  HR: 88 (15 May 2020 05:20) (83 - 88)  BP: 179/80 (15 May 2020 05:20) (123/71 - 179/80)  RR: 17 (15 May 2020 05:20) (16 - 17)  SpO2: 97% (15 May 2020 05:20) (91% - 100%)    I&O's Summary  14 May 2020 07:01  -  15 May 2020 07:00  --------------------------------------------------------  IN: 4320 mL / OUT: 1400 mL / NET: 2920 mL    PHYSICAL EXAM:  GENERAL: NAD   HEAD:  Atraumatic, Normocephalic  ENMT: dry mucous membranes, NG tube in place  NECK: Supple, No JVD  CHEST/LUNG: Rhonchi RLL, other lung fields clear to auscultation bilaterally, good air entry, mildly-labored breathing, tachypneic   HEART: RRR; +S1/S2  ABDOMEN: Soft, Nontender, Nondistended; Bowel sounds present  VASCULAR: Normal pulses, Normal capillary refill, No LE edema. Bilateral upper extremity edema  EXTREMITIES: No calf tenderness, No cyanosis  SKIN: Warm, pressure ulcers to sacrum, buttock, heel  NERVOUS SYSTEM:  Awake, nonverbal, not following commands.     LABS:                        10.8   16.03 )-----------( 191      ( 15 May 2020 06:27 )             32.9     05-14    143  |  108  |  32  ----------------------------<  132  3.5   |  29  |  0.76    Ca    8.0      14 May 2020 11:14  Phos  2.9     05-14  Mg     2.2     05-14    TPro  5.0  /  Alb  1.3  /  TBili  0.2  /  DBili  x   /  AST  42  /  ALT  38  /  AlkPhos  144  05-14    eGFR if : 109 mL/min/1.73M2 (05-14-20 @ 11:14)  eGFR if Non African American: 94 mL/min/1.73M2 (05-14-20 @ 11:14)    POCT Blood Glucose.: 132 mg/dL (15 May 2020 05:22)  POCT Blood Glucose.: 101 mg/dL (14 May 2020 22:13)  POCT Blood Glucose.: 127 mg/dL (14 May 2020 17:39)  POCT Blood Glucose.: 129 mg/dL (14 May 2020 11:50)    Culture - Sputum (collected 08 May 2020 12:15)  Source: .Sputum Sputum  Gram Stain (08 May 2020 18:51):    Numerous polymorphonuclear leukocytes per low power field    Rare Squamous epithelial cells per low power field    Numerous Yeast like cells per oil power field  Final Report (10 May 2020 21:57):    Normal Respiratory Ankita present    RADIOLOGY & ADDITIONAL TESTS:    Care Discussed with Consultants/Other Providers:

## 2020-05-15 NOTE — PROGRESS NOTE ADULT - ASSESSMENT
68 yo male with recent COVID at his SNF(?1 month ago), TBI with VPS who was admitted to [facility] on 5/7 with fever, leukocytosis and hypoxia.  His baseline is non verbal and he appears to be an aspiration risk.  His positive COVID PCR after 2 negative specimens likely reflects non viable RNA and not a new infection.  His PC level is down to .4 from 1.2.his   TTE reveals good LV function.  His hypoxia in part may still reflect COVID induced lung disease.    Suggest:  completed 7 days of Cefepime  Observe off antibiotics.  Anticoagulation per medicine, ? If w/u needed to exclude PE  Follow exam, temps, and routine labs  monitor WBC trend 68 yo male with recent COVID at his SNF(?1 month ago), TBI with VPS who was admitted to [facility] on 5/7 with fever, leukocytosis and hypoxia.  His baseline is non verbal and he appears to be an aspiration risk.  His positive COVID PCR after 2 negative specimens likely reflects non viable RNA and not a new infection.  His PC level is down to .4 from 1.2.his   TTE reveals good LV function.  His hypoxia in part may still reflect COVID induced lung disease.    Suggest:  completed 7 days of Cefepime  Observe off antibiotics.  Anticoagulation per medicine, ? If w/u needed to exclude PE  Follow exam, temps, and routine labs  monitor WBC trend  goals of care to be addressed

## 2020-05-15 NOTE — PROGRESS NOTE ADULT - SUBJECTIVE AND OBJECTIVE BOX
CC: f/u for SOB, leukocytosis    No new c/o, afebrile    REVIEW OF SYSTEMS:   All other review of systems negative (Comprehensive ROS)    Antimicrobials Day #      Other Medications Reviewed    T(F): 98.4 (05-15-20 @ 15:34), Max: 99.1 (05-15-20 @ 05:20)  HR: 76 (05-15-20 @ 15:34)  BP: 110/61 (05-15-20 @ 15:34)  RR: 20 (05-15-20 @ 15:34)  SpO2: 100% (05-15-20 @ 15:34)    PHYSICAL EXAM:  General: alert, no acute distress, restless  Eyes:  anicteric, no conjunctival injection, no discharge  Oropharynx: no lesions or injection 	  Neck: supple, without adenopathy  Lungs: distant BS  Heart: regular rate and rhythm; no murmur, rubs or gallops  Abdomen: soft, nondistended, nontender, without mass or organomegaly  Skin: no lesions  Extremities: no clubbing, cyanosis, or edema  Neurologic: alert,restless,    LAB RESULTS:                        10.8   16.03 )-----------( 191      ( 15 May 2020 06:27 )             32.9     05-15    141  |  107  |  35<H>  ----------------------------<  115<H>  3.8   |  26  |  0.70    Ca    8.1<L>      15 May 2020 06:27  Phos  2.9     05-14  Mg     2.2     05-14    TPro  4.9<L>  /  Alb  1.2<L>  /  TBili  0.2  /  DBili  x   /  AST  39  /  ALT  38  /  AlkPhos  136<H>  05-15    LIVER FUNCTIONS - ( 15 May 2020 06:27 )  Alb: 1.2 g/dL / Pro: 4.9 g/dL / ALK PHOS: 136 U/L / ALT: 38 U/L / AST: 39 U/L / GGT: x               MICROBIOLOGY REVIEWED:  Culture - Sputum . (05.08.20 @ 12:15)    Gram Stain:   Numerous polymorphonuclear leukocytes per low power field  Rare Squamous epithelial cells per low power field  Numerous Yeast like cells per oil power field    Specimen Source: .Sputum Sputum    Culture Results:   Normal Respiratory Ankita present    Culture - Blood (05.07.20 @ 19:00)    Specimen Source: .Blood Blood-Peripheral    Culture Results:   No Growth Final      RADIOLOGY REVIEWED:

## 2020-05-15 NOTE — CONSULT NOTE ADULT - ASSESSMENT
69M with bipolar disorder, hx TBI, HTN, COVID19, acute lower extremity DVT, returns to hospital with acute hypoxic respiratory failure in setting of septic shock due to complex pneumonia (HAP) and suspected PE      supplements  off loading This is a 69 male y/o with bipolar disorder, hx TBI, HTN, COVID19, acute lower extremity DVT, returns to hospital with acute hypoxic respiratory failure in setting of septic shock due to complex pneumonia (HAP) and suspected PE with multiple pressure wounds with calorie protein malnourishment and anemia.       #Unstagable pressure wound to right foot-lateral aspect over the 1st metatarsal head with DTI   -Continue to off load pressure  -Suggest starting medihoney with foam dressing daily     #DTI to right heel   -Continue to off load pressure  -Suggest starting cavilon every other day and cover with foam dressing     #DTI to sacral region   -Continue to off load pressure   -Suggest cavilon with foam dressing daily     Suggest starting MVI daily, Vit C BID, and zinc 220mg daily for total of 9 days via NGT

## 2020-05-15 NOTE — PROGRESS NOTE ADULT - SUBJECTIVE AND OBJECTIVE BOX
Progress: more sob this Am, desaturated,  and now back on non rebreather mask     Present Symptoms:   Dyspnea: yes  Nausea/Vomiting:   Anxiety:    Depressed Mood:   Fatigue: yes  Loss of appetite: yes  Pain:                   location:   Review of Systems:  Unable to obtain due to poor mentation]    MEDICATIONS  (STANDING):  dextrose 5%. 1000 milliLiter(s) (50 mL/Hr) IV Continuous <Continuous>  dextrose 50% Injectable 12.5 Gram(s) IV Push once  dextrose 50% Injectable 25 Gram(s) IV Push once  dextrose 50% Injectable 25 Gram(s) IV Push once  morphine  Infusion 1 mG/Hr (1 mL/Hr) IV Continuous <Continuous>  valproate sodium IVPB 750 milliGRAM(s) IV Intermittent every 12 hours    MEDICATIONS  (PRN):  acetaminophen  Suppository .. 650 milliGRAM(s) Rectal every 6 hours PRN Temp greater or equal to 38C (100.4F), Moderate Pain (4 - 6), Severe Pain (7 - 10)  dextrose 40% Gel 15 Gram(s) Oral once PRN Blood Glucose LESS THAN 70 milliGRAM(s)/deciliter      PHYSICAL EXAM:  Vital Signs Last 24 Hrs  T(C): 37.3 (15 May 2020 05:20), Max: 37.3 (15 May 2020 05:20)  T(F): 99.1 (15 May 2020 05:20), Max: 99.1 (15 May 2020 05:20)  HR: 88 (15 May 2020 05:20) (83 - 88)  BP: 179/80 (15 May 2020 05:20) (123/71 - 179/80)  BP(mean): --  RR: 17 (15 May 2020 05:20) (16 - 17)  SpO2: 97% (15 May 2020 05:20) (91% - 97%)  General: lethargic, and sob this AM       Diet: NPO, NG feeds    LABS:                          10.8   16.03 )-----------( 191      ( 15 May 2020 06:27 )             32.9     05-15    141  |  107  |  35<H>  ----------------------------<  115<H>  3.8   |  26  |  0.70    Ca    8.1<L>      15 May 2020 06:27  Phos  2.9     05-14  Mg     2.2     05-14    TPro  4.9<L>  /  Alb  1.2<L>  /  TBili  0.2  /  DBili  x   /  AST  39  /  ALT  38  /  AlkPhos  136<H>  05-15        RADIOLOGY & ADDITIONAL STUDIES:< from: Xray Chest 1 View- PORTABLE-Urgent (05.15.20 @ 10:57) >  EXAM:  XR CHEST PORTABLE URGENT 1V      PROCEDURE DATE:  05/15/2020        INTERPRETATION:    Examination: XR CHEST URGENT    History: ADMDIAG1: J96.01 ACUTE RESPIRATORY FAILURE WITH HYPOXIA/, hypoxia    Portable chest x-ray is compared to a previous examination dated 5/13/2020.    Impression: Stable NG tube and right  shunt. Stable orthopedic hardware at the upper thoracic spine.    No evidence for pleural effusion or pneumothorax. Skinfold on the left.    Airspace opacifications in both lungs, grossly stable on the left and slightly increased on the right.    The cardiac silhouette is within normal limits.    DISCRETE X-RAY DATA:  Percent of LEFT lung opacification: 34-66%  Percent of RIGHT lung opacification: 34-66%  Change in lung opacification from most recent x-ray (<=3 days): Increase  Change from prior dated 3 or more days (same admission): Increase          ADVANCE DIRECTIVES:  MOLST  DNR/DNI   Advanced Care Planning discussion total time spent:

## 2020-05-15 NOTE — CONSULT NOTE ADULT - SUBJECTIVE AND OBJECTIVE BOX
This is a 70 y/o M with PMHx of HTN, TBI, NPH with  shunt presented from NH for eval of hypoxia. Of note patient known to be COVID 19 positive 3 weeks prior to presentation at [facility]'s ED on 5/7. Per facility recent testing returned negative for COVID 19 infection x2. Patient developed SOB prompting transfer to ED for eval. Upon arrival patient found to be hypoxic on NRB. Patient AOx1 and unable to provide history. Patient does have established HCP who was consult and patient deemed full CODE. Secondary to poor baseline mental status, increased work of breathing and hypoxia patient intubated emergently in the ED.     ED workup significant for WBC 21.84, Na 166, Cr. 2.28, Glu 279, ABG 7.41/34/50 with multiple labs pending. CXR with right sided infiltrates.    Patient admitted to the ICU. Patient successfully extubated on 5/9. Patient moved for med/surg unit on 5/11.   During hospitalization, patient treated for HAP. There is a question whether patient has a PE complicating resp failure picture. Patient completed IV antibiotics. Due to recurrent fever on 5/13 and increased hypoxic demands, ID consulted. ID recommends to continue to monitor off of antibiotics.   Patient already being treated with eliquis for DVT, angio CT chest to r/o PE has not been done.   Patient continues to need         PAST MEDICAL & SURGICAL HISTORY:  Bipolar disorder  UTI (urinary tract infection)  Dysphonia  Metabolic encephalopathy  COVID-19  Altered mental status  Traumatic brain injury, without loss of consciousness, initial encounter  Hypertension  Personal history of spine surgery    Allergies  No Known Allergies    Social Hx:     Family Hx:     ROS:     05-15    141  |  107  |  35<H>  ----------------------------<  115<H>  3.8   |  26  |  0.70    Ca    8.1<L>      15 May 2020 06:27  Phos  2.9     05-14  Mg     2.2     05-14    TPro  4.9<L>  /  Alb  1.2<L>  /  TBili  0.2  /  DBili  x   /  AST  39  /  ALT  38  /  AlkPhos  136<H>  05-15                            10.8   16.03 )-----------( 191      ( 15 May 2020 06:27 )             32.9       MEDICATIONS  (STANDING):  ALPRAZolam 0.125 milliGRAM(s) Oral three times a day  apixaban 5 milliGRAM(s) Oral every 12 hours  dextrose 5%. 1000 milliLiter(s) (50 mL/Hr) IV Continuous <Continuous>  dextrose 50% Injectable 12.5 Gram(s) IV Push once  dextrose 50% Injectable 25 Gram(s) IV Push once  dextrose 50% Injectable 25 Gram(s) IV Push once  diVALproex Sprinkle 750 milliGRAM(s) Oral every 12 hours  famotidine    Tablet 20 milliGRAM(s) Oral daily  insulin lispro (HumaLOG) corrective regimen sliding scale   SubCutaneous every 6 hours  lamoTRIgine 100 milliGRAM(s) Oral two times a day  latanoprost 0.005% Ophthalmic Solution 1 Drop(s) Both EYES at bedtime    MEDICATIONS  (PRN):  acetaminophen   Tablet .. 975 milliGRAM(s) Oral every 6 hours PRN Temp greater or equal to 38C (100.4F)  acetaminophen  Suppository .. 650 milliGRAM(s) Rectal every 6 hours PRN Temp greater or equal to 38C (100.4F), Moderate Pain (4 - 6), Severe Pain (7 - 10)  dextrose 40% Gel 15 Gram(s) Oral once PRN Blood Glucose LESS THAN 70 milliGRAM(s)/deciliter      Vital Signs Last 24 Hrs  T(C): 37.3 (15 May 2020 05:20), Max: 37.3 (15 May 2020 05:20)  T(F): 99.1 (15 May 2020 05:20), Max: 99.1 (15 May 2020 05:20)  HR: 88 (15 May 2020 05:20) (83 - 88)  BP: 179/80 (15 May 2020 05:20) (123/71 - 179/80)  RR: 17 (15 May 2020 05:20) (16 - 17)  SpO2: 97% (15 May 2020 05:20) (91% - 97%) This is a 70 y/o M with PMHx of HTN, TBI, NPH with  shunt presented from NH for eval of hypoxia. Of note patient known to be COVID 19 positive 3 weeks prior to presentation at [facility]'s ED on 5/7. Per facility recent testing returned negative for COVID 19 infection x2. Patient developed SOB prompting transfer to ED for eval. Upon arrival patient found to be hypoxic on NRB. Patient AOx1 and unable to provide history. Patient does have established HCP who was consult and patient deemed full CODE. Secondary to poor baseline mental status, increased work of breathing and hypoxia patient intubated emergently in the ED.     ED workup significant for WBC 21.84, Na 166, Cr. 2.28, Glu 279, ABG 7.41/34/50 with multiple labs pending. CXR with right sided infiltrates.    Patient admitted to the ICU. Patient successfully extubated on 5/9. Patient moved for med/surg unit on 5/11.   During hospitalization, patient treated for HAP. There is a question whether patient has a PE complicating resp failure picture. Patient completed IV antibiotics. Due to recurrent fever on 5/13 and increased hypoxic demands, ID consulted. ID recommends to continue to monitor off of antibiotics.   Patient already being treated with eliquis for DVT, angio CT chest to r/o PE has not been done.   Patient continues to need feeds through NGT, failed bedside swallow on 5/11.     Wound care consulted to evaluate and make recommendations for care of multiple pressure wounds.       PAST MEDICAL & SURGICAL HISTORY:  Bipolar disorder  UTI (urinary tract infection)  Dysphonia  Metabolic encephalopathy  COVID-19  Altered mental status  Traumatic brain injury, without loss of consciousness, initial encounter  Hypertension  Personal history of spine surgery    Allergies  No Known Allergies    Social Hx: per chart, patient from nursing home facility. Unable to obtain rest of hx, patient non verbal     Family Hx: unable to obtain     ROS: unable to obtain, patient non verbal    05-15    141  |  107  |  35<H>  ----------------------------<  115<H>  3.8   |  26  |  0.70    Ca    8.1<L>      15 May 2020 06:27  Phos  2.9     05-14  Mg     2.2     05-14    TPro  4.9<L>  /  Alb  1.2<L>  /  TBili  0.2  /  DBili  x   /  AST  39  /  ALT  38  /  AlkPhos  136<H>  05-15                            10.8   16.03 )-----------( 191      ( 15 May 2020 06:27 )             32.9       MEDICATIONS  (STANDING):  ALPRAZolam 0.125 milliGRAM(s) Oral three times a day  apixaban 5 milliGRAM(s) Oral every 12 hours  dextrose 5%. 1000 milliLiter(s) (50 mL/Hr) IV Continuous <Continuous>  dextrose 50% Injectable 12.5 Gram(s) IV Push once  dextrose 50% Injectable 25 Gram(s) IV Push once  dextrose 50% Injectable 25 Gram(s) IV Push once  diVALproex Sprinkle 750 milliGRAM(s) Oral every 12 hours  famotidine    Tablet 20 milliGRAM(s) Oral daily  insulin lispro (HumaLOG) corrective regimen sliding scale   SubCutaneous every 6 hours  lamoTRIgine 100 milliGRAM(s) Oral two times a day  latanoprost 0.005% Ophthalmic Solution 1 Drop(s) Both EYES at bedtime    MEDICATIONS  (PRN):  acetaminophen   Tablet .. 975 milliGRAM(s) Oral every 6 hours PRN Temp greater or equal to 38C (100.4F)  acetaminophen  Suppository .. 650 milliGRAM(s) Rectal every 6 hours PRN Temp greater or equal to 38C (100.4F), Moderate Pain (4 - 6), Severe Pain (7 - 10)  dextrose 40% Gel 15 Gram(s) Oral once PRN Blood Glucose LESS THAN 70 milliGRAM(s)/deciliter      Vital Signs Last 24 Hrs  T(C): 37.3 (15 May 2020 05:20), Max: 37.3 (15 May 2020 05:20)  T(F): 99.1 (15 May 2020 05:20), Max: 99.1 (15 May 2020 05:20)  HR: 88 (15 May 2020 05:20) (83 - 88)  BP: 179/80 (15 May 2020 05:20) (123/71 - 179/80)  RR: 17 (15 May 2020 05:20) (16 - 17)  SpO2: 97% (15 May 2020 05:20) (91% - 97%)    Physical Exam:   GENERAL: resting in bed, mouth breathing with NRB in place  HEAD:  Atraumatic  ENMT: dry mucous membranes, NG tube in place, NRB in place   CHEST/LUNG:  tachypneic   HEART: radial pulses palpable   ABDOMEN: Soft, Nontender, Nondistended  VASCULAR: Bilateral upper extremity edema  EXTREMITIES: No calf tenderness, No cyanosis  NERVOUS SYSTEM:  Awake, nonverbal, unable to following commands  SKIN: there is an open bullae to the right inner foot at the 1st metatarsal head with dark red/purple discoloration and small amount of eschar over this area. The wound measures 4x3 cm.  There is a blood filled bullae over the right heel measuring 4x4.   There is an open blister noted over the sacrum with dark red/purple discoloration and some partial thickness noted skin loss from open blister. Wound measures 4.25x8cm.   Left heel has intact skin with blanchable erythema and is boogy.

## 2020-05-16 PROCEDURE — 99232 SBSQ HOSP IP/OBS MODERATE 35: CPT | Mod: CS

## 2020-05-16 RX ADMIN — Medication 28.75 MILLIGRAM(S): at 06:16

## 2020-05-16 RX ADMIN — Medication 28.75 MILLIGRAM(S): at 17:21

## 2020-05-16 NOTE — PROGRESS NOTE ADULT - ASSESSMENT
69M with bipolar disorder, hx TBI, HTN, COVID19, acute lower extremity DVT, returns to hospital with acute hypoxic respiratory failure in setting of septic shock due to complex pneumonia (HAP) and suspected PE    Assessment  #Acute hypoxic respiratory failure secondary to COVID-19 with suspected underlying gram negative pneumonia   #Suspected PE (not confirmed however patient already on full dose AC for known DVT)    #Right calf DVT  #ALLYSON : resolved  #Acute metabolic encephalopathy : now at baseline  #Hypernatremia  #NPH  #Bipolar disorder  #HTN    Plans  - currently satting 95% on NRB , started on iv morphine drip yesterday after Palliative and primary team discussion with HCP.  - c/w morphine drip, titrate as needed to keep pt comfortable  - Completed 8 days IV abx- will monitor off abx   - continue eliquis for DVT/suspected PE  - cont home meds  - NGT feeds as  tolerated     Updated Cousin Anel  [phone]  HCP today. 69M with bipolar disorder, hx TBI, HTN, COVID19, acute lower extremity DVT, returns to hospital with acute hypoxic respiratory failure in setting of septic shock due to complex pneumonia (HAP) and suspected PE    Assessment  #Acute hypoxic respiratory failure secondary to COVID-19 with suspected underlying gram negative pneumonia   #Suspected PE (not confirmed however patient already on full dose AC for known DVT)    #Right calf DVT  #ALLYSON : resolved  #Acute metabolic encephalopathy : now at baseline  #Hypernatremia  #NPH  #Bipolar disorder  #HTN    Plans  - currently satting 95% on NRB , started on iv morphine drip yesterday after Palliative and primary team discussion with HCP.  - c/w morphine drip, titrate as needed to keep pt comfortable  - Completed 8 days IV abx- will monitor off abx   - Comfort measures only as per HCP  - DNR/DNI , Palliative following    Updated Cousin Anel  [phone]- , HCP  today.

## 2020-05-16 NOTE — PROGRESS NOTE ADULT - SUBJECTIVE AND OBJECTIVE BOX
Patient is a 69y old  Male who presents with a chief complaint of hypoxic respiratory failure (15 May 2020 16:30)    Interval Hx  Patient seen and examined at bedside. Pt remains in  respiratory distress, minimally responsive, breathing heavily, requiring NRB 15L to maintain sat of 95%, afebrile overnight. pt made comfort care and on morphine drip since yesterday.    ALLERGIES:  No Known Allergies    MEDICATIONS  (STANDING):  dextrose 5%. 1000 milliLiter(s) (50 mL/Hr) IV Continuous <Continuous>  dextrose 50% Injectable 12.5 Gram(s) IV Push once  dextrose 50% Injectable 25 Gram(s) IV Push once  dextrose 50% Injectable 25 Gram(s) IV Push once  morphine  Infusion 1 mG/Hr (1 mL/Hr) IV Continuous <Continuous>  valproate sodium IVPB 750 milliGRAM(s) IV Intermittent every 12 hours    MEDICATIONS  (PRN):  acetaminophen  Suppository .. 650 milliGRAM(s) Rectal every 6 hours PRN Temp greater or equal to 38C (100.4F), Moderate Pain (4 - 6), Severe Pain (7 - 10)  dextrose 40% Gel 15 Gram(s) Oral once PRN Blood Glucose LESS THAN 70 milliGRAM(s)/deciliter    Vital Signs Last 24 Hrs  T(F): 97.4 (16 May 2020 06:00), Max: 98.4 (15 May 2020 15:34)  HR: 67 (16 May 2020 06:00) (67 - 76)  BP: 106/68 (16 May 2020 06:00) (106/68 - 110/61)  RR: 14 (16 May 2020 06:00) (14 - 20)  SpO2: 97% (16 May 2020 06:00) (97% - 100%)  I&O's Summary    15 May 2020 07:01  -  16 May 2020 07:00  --------------------------------------------------------  IN: 0 mL / OUT: 200 mL / NET: -200 mL      PHYSICAL EXAM:  GENERAL: Tachypneic, in respiratory distress  HEAD:  Atraumatic, Normocephalic  ENMT: dry mucous membranes, NG tube in place  NECK: Supple, No JVD  CHEST/LUNG: b/l scattered Rhonchi ,labored breathing, tachypneic   HEART: RRR; +S1/S2  ABDOMEN: Soft, Nontender, Nondistended; Bowel sounds present  VASCULAR: Normal pulses, Normal capillary refill, No LE edema. Bilateral upper extremity edema  EXTREMITIES: No calf tenderness, No cyanosis  SKIN: Warm, pressure ulcers to sacrum, buttock, heel  NERVOUS SYSTEM:  Awake, nonverbal, not following commands.     LABS:                        10.8   16.03 )-----------( 191      ( 15 May 2020 06:27 )             32.9     05-15    141  |  107  |  35  ----------------------------<  115  3.8   |  26  |  0.70    Ca    8.1      15 May 2020 06:27  Phos  2.9     05-14  Mg     2.2     05-14    TPro  4.9  /  Alb  1.2  /  TBili  0.2  /  DBili  x   /  AST  39  /  ALT  38  /  AlkPhos  136  05-15        eGFR if Non African American: 98 mL/min/1.73M2 (05-15-20 @ 06:27)  eGFR if : 113 mL/min/1.73M2 (05-15-20 @ 06:27)        Procalcitonin, Serum: 0.39 ng/mL (05-14-20 @ 11:14)            RADIOLOGY & ADDITIONAL TESTS:    Care Discussed with Consultants/Other Providers:

## 2020-05-17 PROCEDURE — 99232 SBSQ HOSP IP/OBS MODERATE 35: CPT | Mod: CS

## 2020-05-17 RX ADMIN — Medication 28.75 MILLIGRAM(S): at 05:35

## 2020-05-17 RX ADMIN — Medication 28.75 MILLIGRAM(S): at 18:25

## 2020-05-17 NOTE — PROGRESS NOTE ADULT - ASSESSMENT
69M with bipolar disorder, hx TBI, HTN, COVID19, acute lower extremity DVT, returns to hospital with acute hypoxic respiratory failure in setting of septic shock due to complex pneumonia (HAP) and suspected PE    Assessment  #Acute hypoxic respiratory failure secondary to COVID-19 with suspected underlying gram negative pneumonia   #Suspected PE (not confirmed however patient already on full dose AC for known DVT)    #Right calf DVT  #ALLYSON : resolved  #Acute metabolic encephalopathy : now at baseline  #Hypernatremia  #NPH  #Bipolar disorder  #HTN    Plans  - currently  on NRB , on iv morphine drip for comfort measures as decided by HCP  - c/w morphine drip, titrate as needed to keep pt comfortable  - Completed 8 days IV abx- will monitor off abx   - Comfort measures only as per HCP  - DNR/DNI , Palliative following    Updated Cousin nAel  [phone]- , HCP .

## 2020-05-17 NOTE — PROGRESS NOTE ADULT - SUBJECTIVE AND OBJECTIVE BOX
Patient is a 69y old  Male who presents with a chief complaint of hypoxic respiratory failure (16 May 2020 13:17)    Interval Hx  Patient seen and examined at bedside. Pt continues to remain in respiratory distress, requiring NRB breathing heavily, minimally answers questions but blinks his eyes and stares sometimes.    ALLERGIES:  No Known Allergies    MEDICATIONS  (STANDING):  dextrose 5%. 1000 milliLiter(s) (50 mL/Hr) IV Continuous <Continuous>  dextrose 50% Injectable 12.5 Gram(s) IV Push once  dextrose 50% Injectable 25 Gram(s) IV Push once  dextrose 50% Injectable 25 Gram(s) IV Push once  morphine  Infusion 1 mG/Hr (1 mL/Hr) IV Continuous <Continuous>  valproate sodium IVPB 750 milliGRAM(s) IV Intermittent every 12 hours    MEDICATIONS  (PRN):  acetaminophen  Suppository .. 650 milliGRAM(s) Rectal every 6 hours PRN Temp greater or equal to 38C (100.4F), Moderate Pain (4 - 6), Severe Pain (7 - 10)  dextrose 40% Gel 15 Gram(s) Oral once PRN Blood Glucose LESS THAN 70 milliGRAM(s)/deciliter    Vital Signs Last 24 Hrs  T(F): 98.5 (17 May 2020 05:00), Max: 98.5 (17 May 2020 05:00)  HR: 102 (17 May 2020 05:00) (99 - 109)  BP: 143/74 (17 May 2020 05:00) (125/77 - 162/112)  RR: 12 (17 May 2020 05:00) (12 - 15)  SpO2: 92% (17 May 2020 05:00) (92% - 95%)  I&O's Summary    16 May 2020 07:01  -  17 May 2020 07:00  --------------------------------------------------------  IN: 0 mL / OUT: 700 mL / NET: -700 mL      PHYSICAL EXAM  GENERAL: Tachypneic, in respiratory distress  HEAD:  Atraumatic, Normocephalic  ENMT: dry mucous membranes, NG tube in place  NECK: Supple, No JVD  CHEST/LUNG: b/l scattered Rhonchi ,labored breathing, tachypneic   HEART: RRR; +S1/S2  ABDOMEN: Soft, Nontender, Nondistended; Bowel sounds present  VASCULAR: Normal pulses, Normal capillary refill, No LE edema. Bilateral upper extremity edema  EXTREMITIES: No calf tenderness, No cyanosis  SKIN: Warm, pressure ulcers to sacrum, buttock, heel  NERVOUS SYSTEM:  Awake, nonverbal, not following commands      LABS:                        10.8   16.03 )-----------( 191      ( 15 May 2020 06:27 )             32.9     05-15    141  |  107  |  35  ----------------------------<  115  3.8   |  26  |  0.70    Ca    8.1      15 May 2020 06:27    TPro  4.9  /  Alb  1.2  /  TBili  0.2  /  DBili  x   /  AST  39  /  ALT  38  /  AlkPhos  136  05-15        eGFR if Non African American: 98 mL/min/1.73M2 (05-15-20 @ 06:27)  eGFR if : 113 mL/min/1.73M2 (05-15-20 @ 06:27)      RADIOLOGY & ADDITIONAL TESTS:    Care Discussed with Consultants/Other Providers:

## 2020-05-18 ENCOUNTER — TRANSCRIPTION ENCOUNTER (OUTPATIENT)
Age: 70
End: 2020-05-18

## 2020-05-18 VITALS
RESPIRATION RATE: 16 BRPM | OXYGEN SATURATION: 89 % | SYSTOLIC BLOOD PRESSURE: 118 MMHG | TEMPERATURE: 102 F | HEART RATE: 115 BPM | DIASTOLIC BLOOD PRESSURE: 72 MMHG

## 2020-05-18 PROCEDURE — 99233 SBSQ HOSP IP/OBS HIGH 50: CPT | Mod: CS

## 2020-05-18 PROCEDURE — 99497 ADVNCD CARE PLAN 30 MIN: CPT

## 2020-05-18 PROCEDURE — 99238 HOSP IP/OBS DSCHRG MGMT 30/<: CPT | Mod: CS

## 2020-05-18 RX ORDER — MORPHINE SULFATE 50 MG/1
3 CAPSULE, EXTENDED RELEASE ORAL
Qty: 50 | Refills: 0 | Status: DISCONTINUED | OUTPATIENT
Start: 2020-05-18 | End: 2020-05-18

## 2020-05-18 RX ORDER — MORPHINE SULFATE 50 MG/1
0 CAPSULE, EXTENDED RELEASE ORAL
Qty: 0 | Refills: 0 | DISCHARGE
Start: 2020-05-18

## 2020-05-18 RX ORDER — VALPROIC ACID (AS SODIUM SALT) 250 MG/5ML
7.5 SOLUTION, ORAL ORAL
Qty: 0 | Refills: 0 | DISCHARGE
Start: 2020-05-18

## 2020-05-18 RX ORDER — TRAVOPROST 0.04 MG/ML
1 SOLUTION/ DROPS OPHTHALMIC
Qty: 0 | Refills: 0 | DISCHARGE

## 2020-05-18 RX ORDER — MOXIFLOXACIN HYDROCHLORIDE TABLETS, 400 MG 400 MG/1
1 TABLET, FILM COATED ORAL
Qty: 0 | Refills: 0 | DISCHARGE

## 2020-05-18 RX ORDER — OMEPRAZOLE 10 MG/1
1 CAPSULE, DELAYED RELEASE ORAL
Qty: 0 | Refills: 0 | DISCHARGE

## 2020-05-18 RX ORDER — BACITRACIN ZINC 500 UNIT/G
0 OINTMENT IN PACKET (EA) TOPICAL
Qty: 0 | Refills: 0 | DISCHARGE

## 2020-05-18 RX ORDER — LIDOCAINE 4 G/100G
1 CREAM TOPICAL
Qty: 0 | Refills: 0 | DISCHARGE

## 2020-05-18 RX ORDER — ACETAMINOPHEN 500 MG
2 TABLET ORAL
Qty: 0 | Refills: 0 | DISCHARGE

## 2020-05-18 RX ADMIN — MORPHINE SULFATE 3 MG/HR: 50 CAPSULE, EXTENDED RELEASE ORAL at 11:03

## 2020-05-18 RX ADMIN — Medication 28.75 MILLIGRAM(S): at 06:26

## 2020-05-18 RX ADMIN — Medication 650 MILLIGRAM(S): at 08:58

## 2020-05-18 NOTE — GOALS OF CARE CONVERSATION - ADVANCED CARE PLANNING - CONVERSATION/DISCUSSION
Prognosis/MOLST Discussed/Diagnosis
Hospice Referral
MADELINST Discussed/Prognosis
Prognosis/Diagnosis/Hospice Referral

## 2020-05-18 NOTE — PROGRESS NOTE ADULT - ASSESSMENT
A/P : 70 yo M with PMHx of HTN, TBI, NPH with  shunt presented from NH for eval of hypoxia. Of note patient known to be COVID 19 positive 3 weeks ago. Per facility recent testing returned negative for COVID 19 infection.  Secondary to poor baseline mental status, increased work of breathing and hypoxia patient intubated emergent in the ED on 5/7. Was extubated on 5/9 .  Yesterday had fever and inc sob, re swabbed for Covid returned +. Today more sob, lethargic, less responsive, and continues on  non rebreather  mask .       Acute hypoxic respiratory failure secondary to COVID-19 with suspected underlying gram negative pneumonia   #Suspected PE (not confirmed however patient already on full dose AC for known DVT)    - Patient admitted for HAP and suspected PE, had been tolerating nasal cannula and had negative COVID swab on 5/7 and 5/9.  - 5/13 developed fever, O2 requirements increased transferred to NRB mask. COVID PCR + on 5/13.  - was titrated O2 down to nasal cannula 2L with SpO2 100%--  desaturated , now needs non rebreather mask   - Had been on vancomycin, cefepime since 5/7. Vancomycin held since 5/12 for high trough.   - Will continue abx for now (day 8) - f/u procalcitonin, if decreased will DC off abx  - f/u inflammatory markers   - continue eliquis   - ID consult - recs  observe off antbx    	  #Right calf DVT (small)  - Eliquis via NGT    #ALLYSON: RESOLVED    #Acute metabolic encephalopathy due to hypernatremia and Hypophosphatemia  - appears worse today   #Normal pressure hydrocephalus:   -  shunt in place    #Bipolar disorder  - c/w Lamictal and Depakote     #DVT and suspected PE   - eliquis    #Diet - NGT feeds, nutrition following , pt too lethargic to re-test  Aspiration risk     Palliative :   f/u goc, pt now on comfort care measures, is on MS Gtt 1 mg /hr this AM, but was still with labored breathing. Using non rebreather mask. I called and spoke to pts couantonio Tam, reviewed pts condition, and discussed  need to inc MS gtt today for inc labored breathing, and pts comfort.   I discussed option of inpatient hospice services, as he is nearing end of life, cousin is aware and agrees to hospice eval and consult. I also did Face Time session with Anel  and pt today at family request.   I reviewed  this with med team Dr Kruse, she agrees and will be doing Doc to Doc today with [facility].  Hospice referral sent by CM. I discussed case with [facility] liaison today.      Rec inc MS gtt for comfort - reviewed w/ med team already.    Await hospice placement for inpatient services. A/P : 68 yo M with PMHx of HTN, TBI, NPH with  shunt presented from NH for eval of hypoxia. Of note patient known to be COVID 19 positive 3 weeks ago. Per facility recent testing returned negative for COVID 19 infection.  Secondary to poor baseline mental status, increased work of breathing and hypoxia patient intubated emergent in the ED on 5/7. Was extubated on 5/9 .  Yesterday had fever and inc sob, re swabbed for Covid returned +. Today more sob, lethargic, less responsive, and continues on  non rebreather  mask .       Acute hypoxic respiratory failure secondary to COVID-19 with suspected underlying gram negative pneumonia   #Suspected PE (not confirmed however patient already on full dose AC for known DVT)    - Patient admitted for HAP and suspected PE, had been tolerating nasal cannula and had negative COVID swab on 5/7 and 5/9.  - 5/13 developed fever, O2 requirements increased transferred to NRB mask. COVID PCR + on 5/13.  - was titrated O2 down to nasal cannula 2L with SpO2 100%--  desaturated , now needs non rebreather mask   - Had been on vancomycin, cefepime since 5/7. Vancomycin held since 5/12 for high trough.   - Will continue abx for now (day 8) - f/u procalcitonin, if decreased will DC off abx  - f/u inflammatory markers   - continue eliquis   - ID consult - recs  observe off antbx    	  #Right calf DVT (small)  - Eliquis via NGT    #ALLYSON: RESOLVED    #Acute metabolic encephalopathy due to hypernatremia and Hypophosphatemia  - appears worse today   #Normal pressure hydrocephalus:   -  shunt in place    #Bipolar disorder  - c/w Lamictal and Depakote     #DVT and suspected PE   - eliquis    #Diet - NGT feeds, nutrition following , pt too lethargic to re-test  Aspiration risk     Palliative :   f/u goc, pt now on comfort care measures, is on MS Gtt 1 mg /hr this AM, but was still with labored breathing. Using non rebreather mask. I called and spoke to pts couantonio Tam, reviewed pts condition, and discussed  need to inc MS gtt today for inc labored breathing, and pts comfort.   I discussed option of inpatient hospice services, as he is nearing end of life, cousin is aware and agrees to hospice eval and consult. I also did Face Time session with Anel  and pt today at family request.   I reviewed  this with med team Dr Kruse, she agrees and will be doing Doc to Doc today with [facility].  Hospice referral sent by CM. I discussed case with [facility] liaison today.      Rec inc MS gtt for comfort - reviewed w/ med team already.    Await hospice placement for inpatient services.    .

## 2020-05-18 NOTE — PROGRESS NOTE ADULT - ATTENDING COMMENTS
I have personally seen and examined patient on the above date.  I discussed the case with Kirsten Johnston NP and I agree with findings and plan as detailed per note above, which I have amended where appropriate.
agree to above  continue current care   pt remains critically ill  repeat COVID
agree with above
Agree with above    Assessment  1. Hypoxic respiratory failure requiring intubation with Right sided pneumonia unclear if related to COVID 19 vs Suspected PE  2. Septic/hypovolumic shock Resolving now off  pressors  3. Acute renal failure with Hypernatremia with lactic acidosis - Resolving  4. Metabolic encephalopathy ? now back to baseline  5. PMHx of HTN, Bipolar disorder, Glaucoma, TBI, NPH with  shunt  6. Elevated D-Dimer- high suspicion for VTE - Right Calf DVT on US    PLAN:  - continue current care, pt has increased secretions,   - IVF, Tube feeding  - taper steroids  GOC DNR

## 2020-05-18 NOTE — DISCHARGE NOTE PROVIDER - NSDCMRMEDTOKEN_GEN_ALL_CORE_FT
morphine:   valproic acid (as valproate sodium) 100 mg/mL intravenous solution: 7.5 milliliter(s) intravenous every 12 hours

## 2020-05-18 NOTE — PROGRESS NOTE ADULT - REASON FOR ADMISSION
hypoxic respiratory failure

## 2020-05-18 NOTE — PROGRESS NOTE ADULT - SUBJECTIVE AND OBJECTIVE BOX
Progress: eyes open , non focal, non verbal, labored resp this AM on MS gtt 1mg/hr     Present Symptoms:   Dyspnea: yes  Nausea/Vomiting:   Anxiety:    Depressed Mood:   Fatigue: yes  Loss of appetite:   Pain:                   location:   Review of Systems: Unable to obtain due to poor mentation]    MEDICATIONS  (STANDING):  dextrose 5%. 1000 milliLiter(s) (50 mL/Hr) IV Continuous <Continuous>  dextrose 50% Injectable 12.5 Gram(s) IV Push once  dextrose 50% Injectable 25 Gram(s) IV Push once  dextrose 50% Injectable 25 Gram(s) IV Push once  morphine  Infusion 3 mG/Hr (3 mL/Hr) IV Continuous <Continuous>  valproate sodium IVPB 750 milliGRAM(s) IV Intermittent every 12 hours    MEDICATIONS  (PRN):  acetaminophen  Suppository .. 650 milliGRAM(s) Rectal every 6 hours PRN Temp greater or equal to 38C (100.4F), Moderate Pain (4 - 6), Severe Pain (7 - 10)  dextrose 40% Gel 15 Gram(s) Oral once PRN Blood Glucose LESS THAN 70 milliGRAM(s)/deciliter      PHYSICAL EXAM:  Vital Signs Last 24 Hrs  T(C): 37.2 (18 May 2020 09:41), Max: 37.8 (18 May 2020 08:41)  T(F): 99 (18 May 2020 09:41), Max: 100.1 (18 May 2020 08:41)  HR: 111 (18 May 2020 08:41) (106 - 111)  BP: 127/73 (18 May 2020 08:41) (126/64 - 128/74)  BP(mean): --  RR: 20 (18 May 2020 08:41) (15 - 20)  SpO2: 91% (18 May 2020 08:41) (91% - 95%)  General: alert        HEENT: normal  , mouth dry   Diet: NPO,     LABS:                RADIOLOGY & ADDITIONAL STUDIES:    ADVANCE DIRECTIVES:  MOLST  DNR/DNI  comfort care measures   Advanced Care Planning discussion total time spent:

## 2020-05-18 NOTE — PROGRESS NOTE ADULT - SUBJECTIVE AND OBJECTIVE BOX
Patient is a 69y old  Male who presents with a chief complaint of hypoxic respiratory failure (18 May 2020 13:17)      Patient seen and examined at bedside. Lethargic, minimally responsive , breathing heavily on nrb, on morphine drip for comfort measures.    ALLERGIES:  No Known Allergies    MEDICATIONS  (STANDING):  dextrose 5%. 1000 milliLiter(s) (50 mL/Hr) IV Continuous <Continuous>  dextrose 50% Injectable 12.5 Gram(s) IV Push once  dextrose 50% Injectable 25 Gram(s) IV Push once  dextrose 50% Injectable 25 Gram(s) IV Push once  morphine  Infusion 3 mG/Hr (3 mL/Hr) IV Continuous <Continuous>  valproate sodium IVPB 750 milliGRAM(s) IV Intermittent every 12 hours    MEDICATIONS  (PRN):  acetaminophen  Suppository .. 650 milliGRAM(s) Rectal every 6 hours PRN Temp greater or equal to 38C (100.4F), Moderate Pain (4 - 6), Severe Pain (7 - 10)  dextrose 40% Gel 15 Gram(s) Oral once PRN Blood Glucose LESS THAN 70 milliGRAM(s)/deciliter    Vital Signs Last 24 Hrs  T(F): 99 (18 May 2020 09:41), Max: 100.1 (18 May 2020 08:41)  HR: 111 (18 May 2020 08:41) (106 - 111)  BP: 127/73 (18 May 2020 08:41) (126/64 - 128/74)  RR: 20 (18 May 2020 08:41) (15 - 20)  SpO2: 91% (18 May 2020 08:41) (91% - 95%)  I&O's Summary    17 May 2020 07:01  -  18 May 2020 07:00  --------------------------------------------------------  IN: 0 mL / OUT: 700 mL / NET: -700 mL    18 May 2020 07:01  -  18 May 2020 13:32  --------------------------------------------------------  IN: 3 mL / OUT: 0 mL / NET: 3 mL      PHYSICAL EXAM:  General: labored breathing , not awake  ENT: MMM, no thrush  Neck: Supple, No JVD  Lungs: b/l scattered crackles  Cardio: RRR, S1/S2, No murmur  Abdomen: Soft, Nontender, Nondistended; Bowel sounds present  Extremities: No calf tenderness, No pitting edema    LABS:        RADIOLOGY & ADDITIONAL TESTS:    Care Discussed with Consultants/Other Providers: Palliative

## 2020-05-18 NOTE — PROGRESS NOTE ADULT - ASSESSMENT
69M with bipolar disorder, hx TBI, HTN, COVID19, acute lower extremity DVT, returns to hospital with acute hypoxic respiratory failure in setting of septic shock due to complex pneumonia (HAP) and suspected PE    Assessment  #Acute hypoxic respiratory failure secondary to COVID-19 with suspected underlying gram negative pneumonia   #Suspected PE (not confirmed however patient already on full dose AC for known DVT)    #Right calf DVT  #ALLYSON : resolved  #Acute metabolic encephalopathy : now at baseline  #Hypernatremia  #NPH  #Bipolar disorder  #HTN    Plans  - currently  on NRB , on iv morphine drip for comfort measures as decided by HCP  - c/w morphine drip---- increase to 3mg as patient is breathing very heavy and uncomfortable  - Completed 8 days IV abx- will monitor off abx   - Comfort measures only as per HCP  - DNR/DNI  - Dispo : discussed with mell tam hcp who agreed to hospice eval. I had  Doc to doc conversation and Patient is accepted by inpatient hospice and will be discharged today .    Updated Mell Tam  [phone] , HCP .

## 2020-05-18 NOTE — GOALS OF CARE CONVERSATION - ADVANCED CARE PLANNING - CONVERSATION DETAILS
Called pts cousin and HCP Anel , we set up Face time session today, she was very happy to have seen her cousin today .  We had discussion after face time today and Anel  reports this is not pts baseline, this is less than what he was, he used to be able to feed himself with a set up , and was still attempting to walk using a walker, although she  reported his gait as a "shuffle gait".  She realizes this may be possibly due to his dementia progressing. We discussed that he is still not safe to eat by mouth and we are feeding him by the NG tube, we reviewed this is a temporary measure and that we will need to discuss if he would want a permanent feeding tube. I explained that using a peg tube carries some risks and will not change the dementia trajectory.  She understood this. I then asked again about re-intubation if this would be something they would want to do again, at this time, cousin said No , that she would not want him intubated again, I told her I can take this as a verbal and can fill out new molst form for pt to be DNR/DNI. She agreed , she will be speaking to her family about the feeding tube and will let us know this week.
Reviewed chart and reached out to Shruti SANTORO.  Shruti stated she was able to speak with the family and no other action needed at this time. If  further consult with the family is needed than they will re-consult.  Shruti Gunn made Dr. Mcmanus aware.
Pt approved for in-pt hospice. Transport requested for 4pm. Please transfer pt with patent IV access and provide a nurse to nurse report to [phone]
pts health care proxy Anel discussed with her son who is a physician and they want pt to be intubated. pt is AAOx1 at baseline, dementia,  shunt. family understands poor prognosis once intubated but would still like pt intubated
I called and spoke to pts cousin Anel today ,I reviewed pts current condition, and discussed  need to inc MS gtt today for inc labored breathing, and pts comfort.   I also discussed option of inpatient hospice services, as he is nearing end of life, cousin is aware and agrees to hospice eval and consult.  I then did a Face Time session with Anel  and pt today at family request.   I reviewed  this with med team who will be doing Doc to Doc today with HCN.

## 2020-05-18 NOTE — DISCHARGE NOTE PROVIDER - HOSPITAL COURSE
70 yo M with PMHx of HTN, TBI, NPH with  shunt presented from NH for eval of hypoxia. Patient known to be COVID 19 positive 3 weeks ago. Per facility recent testing returned negative for COVID 19 infection. Patient developed SOB prompting transfer to ED for eval. Upon arrival patient found to be hypoxic on NRB.  Secondary to poor baseline mental status, increased work of breathing and hypoxia patient intubated emergent in the ED upon admission, was extubated on 5/9 to venturi mask and has been requiring  NRB intermittently since. GOC conversation was discussed with mell Tam , HCP who decided to make patient DNR/DNI and due to overall worsening of clinical status and poor prognosis comfort measures were decided by HCP . patient has been on morphine drip since 5/15 accepted by hospice service and will be discharged to inpatient hospice .

## 2020-05-18 NOTE — DISCHARGE NOTE NURSING/CASE MANAGEMENT/SOCIAL WORK - PATIENT PORTAL LINK FT
You can access the FollowMyHealth Patient Portal offered by [facility] by registering at the following website: [email or web address]. By joining [employer]’s FollowMyHealth portal, you will also be able to view your health information using other applications (apps) compatible with our system.

## 2020-06-02 PROCEDURE — 93005 ELECTROCARDIOGRAM TRACING: CPT

## 2020-06-02 PROCEDURE — 83735 ASSAY OF MAGNESIUM: CPT

## 2020-06-02 PROCEDURE — 96375 TX/PRO/DX INJ NEW DRUG ADDON: CPT | Mod: XU

## 2020-06-02 PROCEDURE — 36415 COLL VENOUS BLD VENIPUNCTURE: CPT

## 2020-06-02 PROCEDURE — 84100 ASSAY OF PHOSPHORUS: CPT

## 2020-06-02 PROCEDURE — 85379 FIBRIN DEGRADATION QUANT: CPT

## 2020-06-02 PROCEDURE — 85027 COMPLETE CBC AUTOMATED: CPT

## 2020-06-02 PROCEDURE — 84484 ASSAY OF TROPONIN QUANT: CPT

## 2020-06-02 PROCEDURE — 83615 LACTATE (LD) (LDH) ENZYME: CPT

## 2020-06-02 PROCEDURE — 70450 CT HEAD/BRAIN W/O DYE: CPT

## 2020-06-02 PROCEDURE — 92526 ORAL FUNCTION THERAPY: CPT

## 2020-06-02 PROCEDURE — 80053 COMPREHEN METABOLIC PANEL: CPT

## 2020-06-02 PROCEDURE — 81001 URINALYSIS AUTO W/SCOPE: CPT

## 2020-06-02 PROCEDURE — 83036 HEMOGLOBIN GLYCOSYLATED A1C: CPT

## 2020-06-02 PROCEDURE — U0003: CPT

## 2020-06-02 PROCEDURE — 93306 TTE W/DOPPLER COMPLETE: CPT

## 2020-06-02 PROCEDURE — 86140 C-REACTIVE PROTEIN: CPT

## 2020-06-02 PROCEDURE — 87635 SARS-COV-2 COVID-19 AMP PRB: CPT

## 2020-06-02 PROCEDURE — 87070 CULTURE OTHR SPECIMN AEROBIC: CPT

## 2020-06-02 PROCEDURE — 71045 X-RAY EXAM CHEST 1 VIEW: CPT

## 2020-06-02 PROCEDURE — 82803 BLOOD GASES ANY COMBINATION: CPT

## 2020-06-02 PROCEDURE — 94002 VENT MGMT INPAT INIT DAY: CPT

## 2020-06-02 PROCEDURE — 80202 ASSAY OF VANCOMYCIN: CPT

## 2020-06-02 PROCEDURE — 85610 PROTHROMBIN TIME: CPT

## 2020-06-02 PROCEDURE — 80048 BASIC METABOLIC PNL TOTAL CA: CPT

## 2020-06-02 PROCEDURE — 93970 EXTREMITY STUDY: CPT

## 2020-06-02 PROCEDURE — 36600 WITHDRAWAL OF ARTERIAL BLOOD: CPT

## 2020-06-02 PROCEDURE — 99291 CRITICAL CARE FIRST HOUR: CPT | Mod: 25

## 2020-06-02 PROCEDURE — 96374 THER/PROPH/DIAG INJ IV PUSH: CPT | Mod: XU

## 2020-06-02 PROCEDURE — 87040 BLOOD CULTURE FOR BACTERIA: CPT

## 2020-06-02 PROCEDURE — 31500 INSERT EMERGENCY AIRWAY: CPT

## 2020-06-02 PROCEDURE — 83605 ASSAY OF LACTIC ACID: CPT

## 2020-06-02 PROCEDURE — 85730 THROMBOPLASTIN TIME PARTIAL: CPT

## 2020-06-02 PROCEDURE — 87086 URINE CULTURE/COLONY COUNT: CPT

## 2020-06-02 PROCEDURE — 84145 PROCALCITONIN (PCT): CPT

## 2020-06-02 PROCEDURE — 83880 ASSAY OF NATRIURETIC PEPTIDE: CPT

## 2020-06-02 PROCEDURE — 82728 ASSAY OF FERRITIN: CPT

## 2020-06-02 PROCEDURE — 82962 GLUCOSE BLOOD TEST: CPT

## 2021-04-21 NOTE — CONSULT NOTE ADULT - GASTROINTESTINAL
Quality 110: Preventive Care And Screening: Influenza Immunization: Influenza Immunization not Administered for Documented Reasons.
Detail Level: Detailed
details…

## 2021-10-22 NOTE — ED PROVIDER NOTE - ATTENDING CONTRIBUTION TO CARE
The patient is a 11y2m Female complaining of psychiatric evaluation. Dr. Mcmanus: I performed a face to face bedside interview with patient regarding history of present illness, review of symptoms and past medical history. I completed an independent physical exam.  I have discussed patient's plan of care with PA.   I agree with note as stated above, having amended the EMR as needed to reflect my findings.   This includes HISTORY OF PRESENT ILLNESS, HIV, PAST MEDICAL/SURGICAL/FAMILY/SOCIAL HISTORY, ALLERGIES AND HOME MEDICATIONS, REVIEW OF SYSTEMS, PHYSICAL EXAM, and any PROGRESS NOTES during the time I functioned as the attending physician for this patient.    see mdm

## 2022-03-10 NOTE — DISCHARGE NOTE NURSING/CASE MANAGEMENT/SOCIAL WORK - FLU SEASON?
Chief Complaint   Patient presents with   • Telephonic Visit         There were no vitals taken for this visit.    This visit is being performed virtually via Telephonic Visit. Consent to treat includes permission to submit charges to the patient's insurance. It was shared that without being seen and evaluated in person, there is a risk that the information and/or assessment may be incomplete or inaccurate. This telephonic visit may be discontinued by patient or clinician, if it is felt that the telephonic connections are not adequate for his situation.   Clinical Location: [facility] [address]  Brandon's location Home and is physically present in   the [facility] at the time of this visit.   11-20  minutes were spent on this encounter.    Brandon is here in follow up for AF/flutter.  He is s/p typical atrial flutter ablation in 2014.  He also has a history of NICMP (cath in 2014 showed mild CAD and LVEF of 30%) but his EF normalized by 3/2014.  Nuclear stress test in 8/2016 showed no ischemia.  He was still having intermittent palpitations and increased his flecainide to 200 mg bid and symptoms initially improved, but returned. Flecainide and amlodipine were DC'd 8/10/18 and Cardizem 120 mg daily was started. He did not tolerate the Cardizem. Echo done 2/4/2019 - showed LVEF of 53% and THOMPSON of 49 ml/m2.       He ultimately underwent stage 1 of hybrid ablation on 5/21/2019 with NORIS atricip placed.   He is s/p Cryoablation for isolation of pulmonary veins and LA roof for atrial fibrillation / atrial flutter as well as RF ablation of anterior mitral line,coronary sinus , cavo tricuspid isthmus (CTI) and sayda terminalis on 8/14/2019.  He was discharged on amiodarone 200 mg daily and eliquis 5 mg bid.         He was on Eliquis 2.5 mg bid.  He stated that he was experimenting with different medications to see which one was causing his GI issues.  He stated he has been having GI issues for  left-sided pleural effusions. Minimal probable  atelectasis at the lung bases.   2. Stable cardiomegaly.    As part of regular maintenance and monitoring for Amiodarone therapy pt is requires monitoring of TSH, LFT labs and CXR to evaluate for Amiodarone effects. Pt is due for CXR and new labs but is unwilling to be seen in person or have labs drawn or CXR performed citing fear of leaving his home d/t COVID, as such he has not left his home in 2 years. As the benefits of his using Amiodarone outweighs the risks of Amiodarone effects pt will continue on Amiodarone without required monitoring until he feels comfortable with being seen in person. This was discussed with the patient and he is aware of the risks of using Amiodarone without monitoring and is willing to continue with its use in his management.    Atrial flutter/fibrillation (CMS/HCC)  CHADSVASC - 5 (HTN, DM, CHF. Cerebellar Hemorrhage)  Failed flecainide and amiodarone  S/p DCCV 1/19/2014 - amiodarone started  4/2014 - Right AFL ablation  5/21/2019 - Stage 1 of hybrid ablation and s/p left atriclip  8/14/2019 - RFA of AF - Cryoablation for isolation of pulmonary veins and LA roof for atrial fibrillation / atrial flutter   S/p RF ablation of anterior mitral line,coronary sinus , cavo tricuspid isthmus (CTI) and sayda terminalis   Atriclip placed at time of stage 1 hybrid 5/2019  LVEF 53% per echo 2/2019  THOMPSON 49.1 ml/m2  Echo 11/2019 - at [facility] LVEF 58% and THOMPSON 24.4 ml/m2  Anticoagulation - eliquis (started 8/2019) - stopped 11/2019    He continues on Amiodarone 200 mg daily and Coreg 6.25 mg daily. Tolerating without side effects.    Pt feels okay today reporting that he had a hemorrhagic stroke last year and feels that his rhythm has not been stable but feels okay from a rhythm standpoint. He has been taking Amiodarone as 200 mg every other days and feels fine with this dosage. He is due for labs and CXR for amiodarone monitoring but does not feel  comfortable leaving his home.    1. Discussed need for Amiodarone monitoring with pt and risk associated with long term use of Amiodarone. Pt is aware of the risks and has decided to continue with taking Amiodarone without further monitoring. Will continue on 200 mg every other day.  2. Continue with Coreg  3. Follow up in 6 months via telephone.      On 3/10/2022, Osvaldo MCKEON scribed the services personally performed by Mario BREWER Sra, MD   The documentation recorded by the scribe accurately and completely reflects the service(s) I personally performed and the decisions made by me.        Yes...

## 2022-07-07 NOTE — H&P ADULT - NSHPLABSRESULTS_GEN_ALL_CORE
Department of Anesthesiology  Postprocedure Note    Patient: Geovanny Poplin  MRN: [id number]  YOB: 1967  Date of evaluation: 7/7/2022      Procedure Summary     Date: 07/07/22 Room / Location: [address] [place] [address]    Anesthesia Start: 0379 Anesthesia Stop: 9288    Procedure: RIGHT TOTAL KNEE REPLACEMENT WITH ADDUCTOR CANAL BLOCK FOR PAIN CONTROL              ENRIQUE & NEPHEW (Right Knee) Diagnosis:       Primary osteoarthritis of right knee      (OSTEOARTHRITIS RIGHT KNEE)    Surgeons: Bryant Bennett MD Responsible Provider: Gilford Mannan, MD    Anesthesia Type: general, regional ASA Status: 3          Anesthesia Type: No value filed.     Irma Phase I: Rima Score: 9    Irma Phase II:        Anesthesia Post Evaluation    Patient location during evaluation: PACU  Level of consciousness: awake  Airway patency: patent  Nausea & Vomiting: no nausea  Complications: no  Cardiovascular status: blood pressure returned to baseline  Respiratory status: acceptable  Hydration status: euvolemic -                            15.6   7.81  )-----------( 264      ( 14 Aug 2019 16:15 )             46.3            08-14    139  |  105  |  14  ----------------------------<  97  3.7   |  31  |  0.88    Ca    9.3      14 Aug 2019 16:15    TPro  7.1  /  Alb  3.5  /  TBili  0.3  /  DBili  x   /  AST  7<L>  /  ALT  12  /  AlkPhos  98  08-14          Urinalysis Basic - ( 14 Aug 2019 16:58 )    Color: Yellow / Appearance: Turbid / S.010 / pH: x  Gluc: x / Ketone: Trace  / Bili: Negative / Urobili: Negative mg/dL   Blood: x / Protein: Negative mg/dL / Nitrite: Positive   Leuk Esterase: Moderate / RBC: x / WBC 11-25   Sq Epi: x / Non Sq Epi: Few / Bacteria: Many -                            15.6   7.81  )-----------( 264      ( 14 Aug 2019 16:15 )             46.3            08-14    139  |  105  |  14  ----------------------------<  97  3.7   |  31  |  0.88    Ca    9.3      14 Aug 2019 16:15    TPro  7.1  /  Alb  3.5  /  TBili  0.3  /  DBili  x   /  AST  7<L>  /  ALT  12  /  AlkPhos  98  08-          Urinalysis Basic - ( 14 Aug 2019 16:58 )    Color: Yellow / Appearance: Turbid / S.010 / pH: x  Gluc: x / Ketone: Trace  / Bili: Negative / Urobili: Negative mg/dL   Blood: x / Protein: Negative mg/dL / Nitrite: Positive   Leuk Esterase: Moderate / RBC: x / WBC 11-25   Sq Epi: x / Non Sq Epi: Few / Bacteria: Many         XR CHEST AP OR PA 1V                          AP chest.  Surgical hardware projects over the upper thoracic spine. Low lung   volumes. Heart is not grosslyenlarged. Atherosclerotic aorta.   Subsegmental atelectasis left base discoid atelectasis right midlung. No   gross focal infiltrates or pleural effusion.  Image reviewed personally by me.    -

## 2022-07-18 NOTE — H&P ADULT - HISTORY OF PRESENT ILLNESS
68 yo M with PMHx of HTN, TBI, NPH with  shunt presented from NH for eval of hypoxia. Of note patient known to be COVID 19 positive 3 weeks ago. Per facility recent testing returned negative for COVID 19 infection. Patient today developed SOB prompting transfer to ED for eval. Upon arrival patient found to be hypoxic on NRB. Patient AOx1 and unable to provide history. Patient does have established HCP who was consult and patient deemed full CODE. Secondary to poor baseline mental status, increased work of breathing and hypoxia patient intubated emergent in the ED.     ED workup significant for WBC 21.84, Na 166, Cr. 2.28, Glu 279, ABG 7.41/34/50 with multiple labs pending. CXR with right sided infiltrates. impairments found/functional limitations in following categories/risk reduction/prevention/rehab potential

## 2022-11-23 NOTE — H&P ADULT - PROBLEM/PLAN-6
[facility] ([facility]) ED Follow up Call    Reason for ED visit:  Fall     11/23/2022     Epi Marrero , this is [place] from Dr. Jimmie Wells Iliana Sullivan MEDICATION [facility]   12/12/2022  3:40 PM Luis Gordon, DO [facility]

## 2023-11-06 NOTE — DIETITIAN INITIAL EVALUATION ADULT. - WEIGHT IN LBS
160 Azathioprine Pregnancy And Lactation Text: This medication is Pregnancy Category D and isn't considered safe during pregnancy. It is unknown if this medication is excreted in breast milk.

## 2024-04-03 NOTE — H&P ADULT - NSHPPOAPULMEMBOLUS_GEN_A_CORE
Assessment/Plan:    Elevated PSA  The pt's PSA is concerning.    We discussed the significence and 3 options.  - We can recheck a PSA in a short interval to better understand trend/dynamics and if PSA goes down this would be reassuring.  We redo this would ask him to hold off on bike riding for at least 2 to 4 weeks to get a more accurate assessment of his PSA in case his bike riding is the cause for elevation.    - We can obtain an MRI of the prostate which may influence us to pursue a biopsy (if PiRADS >3 lesion) or observation (if PiRADS <3)    - We can move forward with an in office prostate biopsy.    I recommend we move forward with an MRI of the prostate given his PSA was high last year and higher now and while it is possible his bike riding is playing a role unclear how much time we have to hold off on bike riding and how much is going to do so.  I think an MRI is more accurate.  He is agreeable.    Is his MRI returns PiRADS 1 or 2 would recommend repeat PSA in 3 months when he can hold off on bike riding for a few weeks.    He does move forward with a prostate biopsy he should not get propofol with fusion guided biopsy because of his prior significant coronary issue which occurred during hernia repair          Subjective:      Patient ID: Juan Abdul is a 74 y.o. male.    HPI    74-year-old male with elevated PSA.    The patient had a PSA of 8.4 in 2024 from 5.9 in 2023.  No prior values for comparison.  No family history of prostate cancer.  No voiding issues or concern for infection recently.    The patient is an avid bike rider logging 100s of miles per year with multiple cross-country biking trips.  However he did somewhat slow down his biking activity prior to his most recent PSA.    He has a history of vascular disease with CABG and had complications from propofol with significant drop in blood pressure during subsequent hernia repair    Past Surgical History:   Procedure Laterality Date    ANKLE  "SURGERY Right     CORONARY ARTERY BYPASS GRAFT  2 years ago    HERNIA REPAIR          History reviewed. No pertinent past medical history.     AUA SYMPTOM SCORE      Flowsheet Row Most Recent Value   AUA SYMPTOM SCORE    How often have you had a sensation of not emptying your bladder completely after you finished urinating? 1 (P)    How often have you had to urinate again less than two hours after you finished urinating? 0 (P)    How often have you found you stopped and started again several times when you urinate? 1 (P)    How often have you found it difficult to postpone urination? 1 (P)    How often have you had a weak urinary stream? 0 (P)    How often have you had to push or strain to begin urination? 0 (P)    How many times did you most typically get up to urinate from the time you went to bed at night until the time you got up in the morning? 0 (P)    Quality of Life: If you were to spend the rest of your life with your urinary condition just the way it is now, how would you feel about that? 2 (P)    AUA SYMPTOM SCORE 3 (P)              Review of Systems   Constitutional:  Negative for chills and fever.   HENT:  Negative for ear pain and sore throat.    Eyes:  Negative for pain and visual disturbance.   Respiratory:  Negative for cough and shortness of breath.    Cardiovascular:  Negative for chest pain and palpitations.   Gastrointestinal:  Negative for abdominal pain and vomiting.   Genitourinary:  Negative for dysuria and hematuria.   Musculoskeletal:  Negative for arthralgias and back pain.   Skin:  Negative for color change and rash.   Neurological:  Negative for seizures and syncope.   All other systems reviewed and are negative.        Objective:      /82 (BP Location: Left arm, Patient Position: Sitting, Cuff Size: Standard)   Pulse 56   Resp 18   Ht 5' 11\" (1.803 m)   Wt 97.5 kg (215 lb)   SpO2 100%   BMI 29.99 kg/m²     Lab Results   Component Value Date    PSA 8.44 (H) 02/27/2024    PSA " 5.9 (H) 02/10/2023          Physical Exam  Vitals reviewed.   Constitutional:       Appearance: Normal appearance. He is normal weight.   HENT:      Head: Normocephalic and atraumatic.   Eyes:      Pupils: Pupils are equal, round, and reactive to light.   Abdominal:      General: Abdomen is flat.   Neurological:      General: No focal deficit present.      Mental Status: He is alert and oriented to person, place, and time.   Psychiatric:         Mood and Affect: Mood normal.         Thought Content: Thought content normal.           Orders  Orders Placed This Encounter   Procedures    MRI prostate multiparametric wo w contrast     Standing Status:   Future     Standing Expiration Date:   4/3/2028     Scheduling Instructions:      There is no preparation for this test. Please leave your jewelry and valuables at home, wedding rings are the exception. All patients will be required to change into a hospital gown and pants.  Street clothes are not permitted in the MRI.  Magnetic nail polish must be removed prior to arrival for your test. Please bring your insurance cards, a form of photo ID and a list of your medications with you. Arrive 15 minutes prior to your appointment time in order to register. Please bring any prior CT or MRI studies of this area that were not performed at a [facility].            To schedule this appointment, please contact Central Scheduling at [phone].            Prior to your appointment, please make sure you complete the MRI Screening Form when you e-Check in for your appointment. This will be available starting 7 days before your appointment in [employer]. You may receive an e-mail with an activation code if you do not have a [employer] account. If you do not have access to a device, we will complete your screening at your appointment.     Order Specific Question:   Does this procedure require the 3T MRI at [facility]?     Answer:   Yes     Order Specific Question:   Release to  patient through Mychart     Answer:   Immediate     Order Specific Question:   Is order priority selected as STAT?     Answer:   No     Order Specific Question:   Reason for Exam     Answer:   pt with elevated PSA     Order Specific Question:   Reason for Exam:     Answer:   pt with elevated PSA     Order Specific Question:   What is the patient's sedation requirement? If Medication for Claustrophobia is selected, order medication at this point.     Answer:   No Sedation      no

## 2024-04-27 NOTE — PATIENT PROFILE ADULT - NSPROPOAURINARYCATHETER_GEN_A_NUR
Called family to let them know that appointment with Dr. Carrero has been canceled. Let them know that next follow up is 05/04 at 2:30. Left a voicemail.   
no

## 2024-06-18 NOTE — ED BEHAVIORAL HEALTH ASSESSMENT NOTE - NS ED BHA TELEPSYCH PATIENT INTERVIEW
[facility] Discharge Instructions Endoscopy      6/18/2024    During your exam, the physician:    Completed a thorough exam, no specimens were obtained      DIET INSTRUCTIONS:  Resume your regular diet    PRESCRIPTIONS/MEDICATIONS  No new prescriptions given today    A RESPONSIBLE ADULT MUST ACCOMPANY YOU AND DRIVE YOU HOME    You had the following procedure(s) today:   Colonoscopy    1. No specimens were obtained today, so there is no restrictions on any aspirin or anti-inflammatory products.  2. Following sedation, your judgment, perception and coordination are impaired for a minimum of 24 hours.   Therefore:  Do not drive. Do not return to work today.  It is strongly recommended to have someone stay with you at home the day of discharge and provide overnight care.   Do not operate appliances or machinery that require quick reaction time  Do not sign legal documents or be involved in work decisions  Do not smoke or drink alcoholic beverages for 24 hours  Plan to spend a few hours resting before resuming your normal routine    Please call your physician in the event that you experience any of the following or proceed to the nearest hospital in the event of an emergency:     COLONOSCOPY  Fever  Severe abdominal distention or pain. Some mild distention and/or cramping are normal after these procedures but should pass within an hour or two with the passage of air.  Rectal bleeding more than blood streaking on the toilet  tissue  Nausea or Vomiting    If you have any questions or concerns, contact Dr. Susan Moyer [phone]    [facility] will be calling you within 3-5 business days to follow up after your procedure.     ADDITIONAL INSTRUCTIONS:   Normal Exam   Follow up with Leslie YOUNG.  If rectal bleeding recurs, consider a full colonoscopy. Pt had normal colonoscopy on 12/2022.        Want to Say “Thank You” to a Nurse?  The SHADI Award® was created in memory of ONIEL Yo by  his family to say thank you to bedside nurses who provide an outstanding level of care.  Submit a nomination using any method below.     OR    [email or web address]       Patient on 1:1

## 2024-09-06 NOTE — ED CLERICAL - NS ED CLERK UNITS
Hospital Medicine History & Physical Note    Date of Service  9/6/2024    Primary Care Physician  Danielle Dawson M.D.    Consultants  GI and oncology    Specialist Names: GI consultants ([place])  Christiane Molina MD    Code Status  Full Code    Chief Complaint  Bloody stools      History of Presenting Illness  This is a 84 y.o. male here with pancytopenia and GI bleeding, he originally presented to [facility] with 4 episodes of bloody stools over 2 days and dizziness.  He had apparently been noticing smaller amounts of blood in his stool for a few months but was not too worried about it he describes the blood as red and was feeling short of breath as well as dizzy at presentation.  Initial CBC showed a WBC of 3.2 with 620 ANC, 19% polys.  His platelet count was 1K and there was no mention of platelet clumping, and initial hemoglobin was 5.5.  He received 2 units of packed red cells as well as 2 units of platelets -with improvement of hemoglobin to 7.4 and platelets to 88K.  Prior to this patient denied any other episodes of abnormal bleeding including bleeding gums, hematuria or other abnormal findings.  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  By yesterday afternoon platelet count was down to 66,000 and today it is 33,000.  His WBCs are also lower this morning at 2.2 hemoglobin stable at 9.5.  B12 was found to be low at less than 150 and patient is a vegetarian he was given IM B12 which is ordered daily for 4 doses and then every 30 days.  I have told him he will need to continue to monitor his B12 levels going forward.  GI bleed is likely secondary to underlying hematologic process he may require  repeat colonoscopy in the future but is not urgent at this time.  Patient was discussed with oncologist/[facility] who agrees that patient needs a bone marrow biopsy during this hospitalization episode and recommends that patient be transferred up to [facility] for further evaluation and to have the procedure.  Given the trajectory of his platelets he will likely also need further platelet transfusions to keep him stabilized.    Patient's son is a family physician in [facility], he will be arriving in town on Saturday 9/7.  I attempted to leave a message for the patient's other son regarding patient's transfer but had to leave a voicemail.    I discussed the plan of care with patient and oncology.    Review of Systems  Review of Systems   Constitutional:  Negative for chills and fever.   Respiratory:  Negative for shortness of breath.    Cardiovascular:  Negative for chest pain.   Gastrointestinal:  Negative for blood in stool (resolved).   Genitourinary:  Negative for dysuria and hematuria.   Neurological:  Negative for dizziness and headaches.   Psychiatric/Behavioral:  The patient is nervous/anxious.        Past Medical History   has a past medical history of Diabetes (HCC).    Surgical History   has a past surgical history that includes turp-vapor (8/14/2017) and panendoscopy (N/A, 9/5/2024).     Family History  family history is not on file.   Family history reviewed with patient. There is no family history that is pertinent to the chief complaint.     Social History   reports that he has never smoked. He has never used smokeless tobacco. He reports that he does not drink alcohol and does not use drugs.    Allergies  No Known Allergies    Medications  Cannot display prior to admission medications because the patient has not been admitted in this contact.       Physical Exam  Temp:  [36.1 °C (97 °F)-36.9 °C (98.5 °F)] 36.4 °C (97.6 °F)  Pulse:  [63-96] 73  Resp:   APER [14-18] 18  BP: (102-134)/(42-60) 116/50  SpO2:  [97 %-100 %] 100 %                          Physical Exam  Vitals and nursing note reviewed.   Constitutional:       General: He is not in acute distress.     Appearance: He is not ill-appearing, toxic-appearing or diaphoretic.   HENT:      Head: Normocephalic and atraumatic.      Mouth/Throat:      Mouth: Mucous membranes are moist.      Comments: No purpura or petechiae  Eyes:      General:         Right eye: No discharge.         Left eye: No discharge.      Extraocular Movements: Extraocular movements intact.      Pupils: Pupils are equal, round, and reactive to light.   Cardiovascular:      Rate and Rhythm: Normal rate and regular rhythm.   Pulmonary:      Effort: Pulmonary effort is normal. No respiratory distress.      Breath sounds: Normal breath sounds.   Abdominal:      General: There is no distension.      Palpations: Abdomen is soft.      Tenderness: There is no abdominal tenderness.   Musculoskeletal:      Right lower leg: No edema.      Left lower leg: No edema.   Skin:     General: Skin is warm and dry.      Findings: No bruising.      Comments: No petechia   Neurological:      General: No focal deficit present.      Mental Status: He is alert and oriented to person, place, and time.      Cranial Nerves: No cranial nerve deficit.   Psychiatric:         Mood and Affect: Mood normal.         Behavior: Behavior normal.         Laboratory:  Recent Labs     09/05/24  1716 09/06/24  0515 09/06/24  1456   WBC 2.7* 2.2* 2.5*   RBC 3.00* 2.97* 2.84*   HEMOGLOBIN 9.7* 9.5* 9.1*   HEMATOCRIT 27.2* 26.8* 25.7*   MCV 90.7 90.2 90.5   MCH 32.3 32.0 32.0   MCHC 35.7 35.4 35.4   RDW 51.6* 50.0 49.9   PLATELETCT 41* 33* 30*   MPV 9.4 8.5* 9.1     Recent Labs     09/04/24  0305 09/05/24  0910 09/06/24  0515   SODIUM 134* 138 133*   POTASSIUM 4.4 3.6 3.8   CHLORIDE 104 101 102   CO2 21 24 22   GLUCOSE 118* 131* 184*   BUN 20 19 16   CREATININE 0.78 0.78 0.82   CALCIUM 8.6  "8.5 8.3*     Recent Labs     09/04/24  0305 09/05/24  0910 09/06/24  0515   GLUCOSE 118* 131* 184*         No results for input(s): \"NTPROBNP\" in the last 72 hours.      No results for input(s): \"TROPONINT\" in the last 72 hours.    Imaging:  No orders to display       none    Assessment/Plan:  Justification for Admission Status  I anticipate this patient will require at least two midnights for appropriate medical management, necessitating inpatient admission because pancytopenia and GIB    Patient will need a Med/Surg bed on MEDICAL service .  The need is secondary to Stable Cardiac status and need for medical monitoring andb treatment.    B12 deficiency- (present on admission)  Assessment & Plan  Loading x 4 doses then IM monthly    Type 2 diabetes mellitus, without long-term current use of insulin (HCC)- (present on admission)  Assessment & Plan  CHO diet and SSI    GI bleed- (present on admission)  Assessment & Plan  Stopped, no active bleeding, poor colon prep, EGD with ? Watermelon stomach (path pending)  CTM  Will need repeat colonoscopy later  On PPI      Thrombocytopenia (HCC)- (present on admission)  Assessment & Plan  Trending down, transfuse if bleeding recurs or at threshold TBD by hematology    Pancytopenia (HCC)- (present on admission)  Assessment & Plan  ? MDS  Needs bone marrow Bx  ANC ~ 600s        VTE prophylaxis: SCDs/TEDs  "

## 2024-09-16 NOTE — ED ADULT TRIAGE NOTE - CHIEF COMPLAINT QUOTE
Patient informed Patient sent from rehab facility s/p verbal altercation. patient ending his stay at rehab facility and was told he was going to be discharged to a different long term housing facility when patient became upset, angry and made a violent statement about slitting someone's throat. Patient denies SI or homicidal. admits he made statement out of anger.

## 2024-11-26 NOTE — ED PROVIDER NOTE - NSTIMEPROVIDERCAREINITIATE_GEN_ER
"  Pharmacist Clinic: Cardiology Management    Kristin Charles \"Edel\" is a 83 y.o. female was referred to Clinical Pharmacy Team for Heart Failure management.     Referring Provider: Cruz Fang MD    THIS IS A FOLLOW UP PATIENT APPOINTMENT. AT LAST VISIT ON 7/17/24 WITH PHARMACIST (Juno Klein).    Appointment was completed by Edel who was reached at primary number.    REVIEW OF PAST APPNT (IF APPLICABLE):   Edel is currently receiving Jardiance and Entresto through [facility] with a renewal date of 4/15/25.   No changes were made to medication at last appointment. Did not history of yeast infection while on Jardaince and was previously using Entresto daily, but start using half tablets BID.    Allergies   Allergen Reactions    Other Other    Sulfa (Sulfonamide Antibiotics) Unknown    Miconazole Rash    Neomycin-Bacitracin-Polymyxin Rash       Past Medical History:   Diagnosis Date    Abnormal ECG     Actinic keratosis     Acute candidiasis of vulva and vagina     Yeast infection of the vagina    Acute laryngitis 03/20/2018    Acute laryngitis    Lee's cyst of knee 04/06/2023    Bitten or stung by nonvenomous insect and other nonvenomous arthropods, initial encounter 09/02/2015    Bug bite with infection    Candidiasis, unspecified 10/13/2017    Yeast infection    CHF (congestive heart failure)     Chronic ethmoidal sinusitis 11/24/2020    Chronic ethmoidal sinusitis    Chronic maxillary sinusitis 08/06/2020    Chronic maxillary sinusitis    Diabetes mellitus (Multi)     Disease of thyroid gland     LAYTON (dyspnea on exertion) 09/28/2023    Eczema     GERD (gastroesophageal reflux disease)     Hair loss 04/06/2023    Heart murmur     Heart valve disease     Laceration without foreign body of unspecified forearm, initial encounter 01/09/2022    Forearm laceration    Left knee DJD 04/06/2023    Lumbago with sciatica, left side 03/29/2019    Chronic bilateral low back pain with left-sided sciatica    " Lymphocytic colitis 05/05/2023    Colonoscopy: 4/24/2023    Nail disorder, unspecified 08/08/2023    Nasal turbinate hypertrophy 04/06/2023    Neoplasm of uncertain behavior of skin 08/08/2023    Obesity 01/19/2015    Onychomycosis 04/06/2023    Open wound of hip and thigh with tendon involvement 05/24/2018    Osteoarthritis 04/06/2023    Osteoarthritis, localized, knee 04/06/2023    Overweight (BMI 25.0-29.9) 04/06/2023    Personal history of diseases of the skin and subcutaneous tissue 01/09/2022    History of contact dermatitis    Personal history of diseases of the skin and subcutaneous tissue 09/02/2015    History of cellulitis    Personal history of other diseases of the musculoskeletal system and connective tissue     Personal history of arthritis    Personal history of other diseases of the nervous system and sense organs     History of cataract    Personal history of other diseases of the nervous system and sense organs 09/20/2019    History of sciatica    Personal history of other endocrine, nutritional and metabolic disease     History of hypothyroidism    Personal history of other infectious and parasitic diseases 09/23/2017    History of erysipelas    Personal history of other specified conditions 03/06/2020    History of epistaxis    Rash and other nonspecific skin eruption 06/15/2015    Skin rash    Rhinitis 10 years    Sciatica 12/11/2014    Status post joint replacement 06/19/2023    Strain of muscle, fascia and tendon of the posterior muscle group at thigh level, unspecified thigh, initial encounter 05/01/2018    Hamstring strain    Tinnitus 10 years    Vaginal discharge 04/06/2023    Varus deformity, not elsewhere classified, unspecified knee 03/06/2019    Acquired genu varum       Current Outpatient Medications on File Prior to Visit   Medication Sig Dispense Refill    acetaminophen (Tylenol) 325 mg tablet Take 2 tablets (650 mg) by mouth every 6 hours if needed for moderate pain (4 - 6), mild  "pain (1 - 3), headaches or fever (temp greater than 38.0 C).      amoxicillin (Amoxil) 500 mg capsule Take 4 caps (2000 mg) 30-60mins prior to your dental appointment 4 capsule 5    aspirin 81 mg EC tablet Take 1 tablet (81 mg) by mouth once daily.      biotin 10 mg tablet Take 1 tablet (10 mg) by mouth once daily. (Patient not taking: Reported on 11/12/2024)      blood sugar diagnostic strip Use as directed to test blood sugar once daily 100 each 11    blood-glucose meter misc Use as directed to test blood sugar once daily 1 each 0    celecoxib (CeleBREX) 200 mg capsule Take 1 capsule (200 mg) by mouth once daily.      empagliflozin (Jardiance) 10 mg Take 1 tablet (10 mg) by mouth once daily. (Patient taking differently: Take 0.5 tablets (5 mg) by mouth once daily.) 90 tablet 3    famotidine (Pepcid) 10 mg tablet Take 1 tablet (10 mg) by mouth once daily as needed for heartburn.      insulin degludec (Tresiba FlexTouch) 100 unit/mL (3 mL) injection Inject 5 Units under the skin once daily at bedtime. Take as directed per insulin instructions. (Patient taking differently: Inject 4 Units under the skin once daily at bedtime. Take as directed per insulin instructions.) 15 mL 12    lancets 30 gauge misc Use as directed to test blood sugar once daily 100 each 11    lancets misc Use as directed to test blood sugar once daily 100 each 3    levothyroxine (Synthroid, Levoxyl) 125 mcg tablet Take 1 tablet (125 mcg) by mouth once daily. 90 tablet 1    metoprolol succinate XL (Toprol-XL) 25 mg 24 hr tablet Take 1 tablet (25 mg) by mouth once daily. Do not crush or chew. 90 tablet 3    pen needle, diabetic 31 gauge x 5/16\" needle Use as directed to inject insulin once daily 100 each 11    predniSONE (Deltasone) 1 mg tablet Take 3 tablets (3 mg) by mouth once daily. 90 tablet 2    sacubitriL-valsartan (Entresto) 24-26 mg tablet Take 1 tablet by mouth once daily. (Patient taking differently: Take 0.5 tablets by mouth 2 times a " "day.) 90 tablet 3    traZODone (Desyrel) 50 mg tablet Take 1 tablet (50 mg) by mouth as needed at bedtime.       No current facility-administered medications on file prior to visit.         RELEVANT LAB RESULTS:  Lab Results   Component Value Date    BILITOT 0.5 11/09/2024    CALCIUM 8.8 11/09/2024    CO2 29 11/09/2024     11/09/2024    CREATININE 1.00 11/09/2024    GLUCOSE 142 (H) 11/09/2024    ALKPHOS 47 11/09/2024    K 4.3 11/09/2024    PROT 6.1 (L) 11/09/2024     11/09/2024    AST 11 11/09/2024    ALT 11 11/09/2024    BUN 20 11/09/2024    ANIONGAP 9 (L) 11/09/2024    MG 2.03 06/18/2024    PHOS 3.3 10/07/2022    ALBUMIN 3.8 11/09/2024    GFRF CANCELED 05/27/2023    GFRMALE CANCELED 05/27/2023     Lab Results   Component Value Date    TRIG 136 11/15/2023    CHOL 171 11/15/2023    LDLCALC 90 11/15/2023    HDL 53.6 11/15/2023     No results found for: \"BMCBC\", \"CBCDIF\"     PHARMACEUTICAL ASSESSMENT:  Drug Interactions? No    Medication Documentation Review Audit       Reviewed by Mellissa Webster MD (Physician) on 11/14/24 at 1326      Medication Order Taking? Sig Documenting Provider Last Dose Status   acetaminophen (Tylenol) 325 mg tablet [id number] No Take 2 tablets (650 mg) by mouth every 6 hours if needed for moderate pain (4 - 6), mild pain (1 - 3), headaches or fever (temp greater than 38.0 C). RAZA Sauceda-CNP Taking Active   amoxicillin (Amoxil) 500 mg capsule [id number] No Take 4 caps (2000 mg) 30-60mins prior to your dental appointment Travis Christensen APRN-CNP Taking Active   aspirin 81 mg EC tablet [id number]  Take 1 tablet (81 mg) by mouth once daily. Historical Provider, MD  Active   biotin 10 mg tablet [id number] No Take 1 tablet (10 mg) by mouth once daily.   Patient not taking: Reported on 11/12/2024    Historical Provider, MD Taking Active   blood sugar diagnostic strip [id number]  Use as directed to test blood sugar once daily Melanie Muñoz, DO  Active   blood-glucose meter misc " "[id number]  Use as directed to test blood sugar once daily Melanie Muñoz, DO  Active   celecoxib (CeleBREX) 200 mg capsule [id number] No Take 1 capsule (200 mg) by mouth once daily. Historical Provider, MD Taking Active   empagliflozin (Jardiance) 10 mg [id number] No Take 1 tablet (10 mg) by mouth once daily.   Patient taking differently: Take 0.5 tablets (5 mg) by mouth once daily.    Cruz Fang MD Taking Active   famotidine (Pepcid) 10 mg tablet [id number] No Take 1 tablet (10 mg) by mouth once daily as needed for heartburn. Historical Provider, MD Taking Active   fluconazole (Diflucan) 100 mg tablet [id number]  Take 1 tablet (100 mg) by mouth once daily for 14 days. Marcus French, DO   24 2359   insulin degludec (Tresiba FlexTouch) 100 unit/mL (3 mL) injection [id number] No Inject 5 Units under the skin once daily at bedtime. Take as directed per insulin instructions.   Patient taking differently: Inject 4 Units under the skin once daily at bedtime. Take as directed per insulin instructions.    Melanie Muñoz DO Taking Active   lancets 30 gauge misc [id number]  Use as directed to test blood sugar once daily Melanie Muñoz DO  Active   lancets misc [id number]  Use as directed to test blood sugar once daily Melanie Muñoz DO  Active   levothyroxine (Synthroid, Levoxyl) 125 mcg tablet [id number] No Take 1 tablet (125 mcg) by mouth once daily. Melanie Muñoz DO Taking Active   metoprolol succinate XL (Toprol-XL) 25 mg 24 hr tablet [id number]  Take 1 tablet (25 mg) by mouth once daily. Do not crush or chew. Cruz Fang MD  Active   pen needle, diabetic 31 gauge x \" needle [id number] No Use as directed to inject insulin once daily Melanie Muñoz DO Taking Active   predniSONE (Deltasone) 1 mg tablet [id number] No Take 4 tablets (4 mg) by mouth once daily.   Patient taking differently: Take 3 tablets (3 mg) by mouth once daily.    Mellissa Webster MD Taking Active   predniSONE (Deltasone) " 5 mg tablet [id number] No Take 1 tablet PO daily with 1 mg prednisone tablet for total of 6 mg   Patient not taking: Reported on 11/12/2024    Mellissa Webster MD Taking Active   sacubitriL-valsartan (Entresto) 24-26 mg tablet [id number] No Take 1 tablet by mouth once daily.   Patient taking differently: Take 0.5 tablets by mouth 2 times a day.    Cruz Fang MD Taking Active   traZODone (Desyrel) 50 mg tablet [id number] No Take 1 tablet (50 mg) by mouth as needed at bedtime. Historical Provider, MD Taking Active                    DISEASE MANAGEMENT ASSESSMENT:     CHF ASSESSMENT     Symptom/Staging:  -Most recent ejection fraction: 50-55%  -NYHA Stage: I    Results for orders placed during the hospital encounter of 07/22/24    Transthoracic echo (TTE) complete    Narrative  [facility] (formerly [facility]), [address] [place] [address]  Tel [phone] and Fax [phone]    TRANSTHORACIC ECHOCARDIOGRAM REPORT      Patient Name:      CHEL CRUZBRINA     Reading Physician:    [id number] Raul Trivedi MD  Study Date:        7/22/2024            Ordering Provider:    [id number] MARSHA VILLAREAL  MRN/PID:           [id number]             Fellow:  Accession#:        [id number]         Nurse:  Date of Birth/Age: 1941 / 83 years Sonographer:          Rj Sharma  [facility]  Gender:            F                    Additional Staff:  Height:            154.94 cm            Admit Date:  Weight:            68.95 kg             Admission Status:     Outpatient  BSA / BMI:         1.68 m2 / 28.72      Encounter#:           [id number]  kg/m2  Blood Pressure:    144/62 mmHg          Department Location:  [facility]    Study Type:    TRANSTHORACIC ECHO (TTE) COMPLETE  Diagnosis/ICD: Presence of prosthetic heart valve-Z95.2; Nonrheumatic aortic  (valve) stenosis-I35.0  Indication:    AS, s/p TAVR (26mm Haile) on 6/17/2024  CPT Code:      Echo Complete w Full Doppler-[id number]    Patient History:  Pertinent History: LAYTON, AS,  s/p TAVR (26mm Haile) on 6/17/2024, CKD, GERD,  HTN.    Study Detail: The following Echo studies were performed: 2D, M-Mode, Doppler and  color flow. Technically challenging study due to the patient's  lack of cooperation, body habitus and active neuralgia pain.      PHYSICIAN INTERPRETATION:  Left Ventricle: The left ventricular systolic function is low normal, with a visually estimated ejection fraction of 50-55%. There are no regional wall motion abnormalities. The left ventricular cavity size is normal. Spectral Doppler shows an impaired relaxation pattern of left ventricular diastolic filling.  Left Atrium: The left atrium is mildly dilated.  Right Ventricle: The right ventricle is normal in size. There is normal right ventricular global systolic function.  Right Atrium: The right atrium is normal in size.  Aortic Valve: There is a prosthetic aortic valve present. The aortic valve dimensionless index is 0.23. There is no evidence of aortic valve regurgitation. The peak instantaneous gradient of the aortic valve is 25.4 mmHg. The mean gradient of the aortic valve is 14.5 mmHg.  Mitral Valve: The mitral valve is normal in structure. There is mild to moderate mitral valve regurgitation.  Tricuspid Valve: The tricuspid valve is structurally normal. There is mild tricuspid regurgitation.  Pulmonic Valve: The pulmonic valve is not well visualized. There is no indication of pulmonic valve regurgitation.  Pericardium: There is no pericardial effusion noted.  Aorta: The aortic root is normal.  Systemic Veins: The inferior vena cava was not well visualized.  In comparison to the previous echocardiogram(s): No significant changes compared to prior cardiogram dated 6/18/2024.      CONCLUSIONS:  1. The left ventricular systolic function is low normal, with a visually estimated ejection fraction of 50-55%.  2. Spectral Doppler shows an impaired relaxation pattern of left ventricular diastolic filling.  3. There is normal  right ventricular global systolic function.  4. The left atrium is mildly dilated.  5. Mild to moderate mitral valve regurgitation.  6. Hemodynamics are consistent with normal functioning of the bioprosthetic TAVR valve.    QUANTITATIVE DATA SUMMARY:  2D MEASUREMENTS:  Normal Ranges:  LAs:           4.26 cm   (2.7-4.0cm)  IVSd:          1.09 cm   (0.6-1.1cm)  LVPWd:         1.13 cm   (0.6-1.1cm)  LVIDd:         4.18 cm   (3.9-5.9cm)  LVIDs:         2.69 cm  LV Mass Index: 94.3 g/m2  LV % FS        35.5 %    LA VOLUME:  Normal Ranges:  LA Vol A4C:        54.9 ml    (22+/-6mL/m2)  LA Vol A2C:        63.0 ml  LA Vol BP:         59.3 ml  LA Vol Index A4C:  32.7 ml/m2  LA Vol Index A2C:  37.5 ml/m2  LA Vol Index BP:   35.3 ml/m2  LA Area A4C:       18.5 cm2  LA Area A2C:       20.0 cm2  LA Major Axis A4C: 5.3 cm  LA Major Axis A2C: 5.4 cm  LA Vol A4C:        51.9 ml  LA Vol A2C:        59.4 ml    AORTA MEASUREMENTS:  Normal Ranges:  Asc Ao, d: 3.60 cm (2.1-3.4cm)    LV SYSTOLIC FUNCTION BY 2D PLANIMETRY (MOD):  Normal Ranges:  EF-A4C View:    49 % (>=55%)  EF-A2C View:    65 %  EF-Visual:      53 %  LV EF Reported: 53 %    LV DIASTOLIC FUNCTION:  Normal Ranges:  MV Peak E:        0.68 m/s    (0.7-1.2 m/s)  MV Peak A:        1.15 m/s    (0.42-0.7 m/s)  E/A Ratio:        0.59        (1.0-2.2)  MV e'             0.090 m/s   (>8.0)  MV lateral e'     0.12 m/s  MV medial e'      0.06 m/s  MV A Dur:         211.07 msec  E/e' Ratio:       7.53        (<8.0)  PulmV Sys Elias:    49.38 cm/s  PulmV Vargas Elias:   27.00 cm/s  PulmV S/D Elias:    1.83  PulmV A Revs Elias: 26.16 cm/s    MITRAL VALVE:  Normal Ranges:  MV DT: 349 msec (150-240msec)    AORTIC VALVE:  Normal Ranges:  AoV Vmax:                2.52 m/s  (<=1.7m/s)  AoV Peak P.4 mmHg (<20mmHg)  AoV Mean P.5 mmHg (1.7-11.5mmHg)  LVOT Max Elias:            0.72 m/s  (<=1.1m/s)  AoV VTI:                 67.61 cm  (18-25cm)  LVOT VTI:                15.65  cm  AoV Dimensionless Index: 0.23      RIGHT VENTRICLE:  RV Basal 3.30 cm  RV Mid   2.30 cm  RV Major 6.5 cm  TAPSE:   20.0 mm  RV s'    0.19 m/s    TRICUSPID VALVE/RVSP:  Normal Ranges:  Peak TR Velocity: 2.36 m/s  RV Syst Pressure: 25.3 mmHg (< 30mmHg)  IVC Diam:         1.50 cm    PULMONIC VALVE:  Normal Ranges:  PV Max Elias: 0.9 m/s  (0.6-0.9m/s)  PV Max PG:  3.1 mmHg    Pulmonary Veins:  PulmV A Revs Elias: 26.16 cm/s  PulmV Vargas Elias:   27.00 cm/s  PulmV S/D Elias:    1.83  PulmV Sys Elias:    49.38 cm/s      [id number] Raul Trivedi MD  Electronically signed on 7/22/2024 at 11:26:02 AM        ** Final **      Guideline-Directed Medical Therapy:  -ARNI: Yes, describe: Entresto 12-13mg BID  -Beta Blocker: Yes, describe: metoprolol succinate 25mg daily  -MRA: No  -SGLT2i: Yes, describe: Jardiance 10mg daily    Secondary Prevention:  -The ASCVD Risk score (Wolsey DK, et al., 2019) failed to calculate for the following reasons:    The 2019 ASCVD risk score is only valid for ages 40 to 79   -Aspirin 81mg? yes  -Statin?: No  -HTN?: Yes, describe: controlled    CURRENT PHARMACOTHERAPY:    Entresto 24-26mg 0.5 tablet BID  Jardiance 10mg daily  Metoprolol succinate 25mg daily    Affordability: [facility]  Adherence/Compliance: frequently forgets to take evening dose of Entresto  Adverse Effects: Yeast infections with jardaince    Monitoring Weights at Home: Yes  Home Weight Recordings: 150lbs    Past In Office Weight Readings:   Wt Readings from Last 6 Encounters:   11/14/24 69.8 kg (153 lb 12.8 oz)   11/12/24 70.3 kg (155 lb)   10/01/24 68.5 kg (151 lb)   09/05/24 69.9 kg (154 lb)   08/26/24 69.9 kg (154 lb)   08/13/24 69.9 kg (154 lb)       Monitoring Blood Pressure at Home: No  Home BP Recordings: usually checks but recently moved and has not been able to find blood pressure cuff.    Past In Office BP Readings:   BP Readings from Last 6 Encounters:   11/14/24 124/77   11/12/24 124/70   10/01/24 120/70   09/05/24 102/69   08/26/24  110/62   08/13/24 124/80       HEALTH MANAGEMENT    Maintaining fluid restriction (<2 L/day): N/A  Edema/swelling: No  Shortness of breath: No  Trouble sleeping/lying down: No  Dry/hacking cough: No  Recent Hospitalizations: No    EDUCATION/COUNSELING:   - Counseled patient on MOA, expectations, duration of therapy, contraindications, administration, and monitoring parameters  - Counseled patient on lifestyle modifications that can decrease your risk of having complications (smoking cessation, losing weight, daily weights, vaccines)  - Counseled patient on fluid intake and weight management. Recommended to not consume more than 2 liters of fliuids per day. If they have gained more than 2-3 pounds within a 24 hours period (or 5 pounds in a week), contact their cardiologist  - Answered all patient questions and concerns        DISCUSSION/NOTES:   Continuing to report having yeast infections with lower dose of Jardiance. At this point we will discontinue Jardiance.  States that she is having issues remember to use Entresto in the evening. Was trying to take at dinner time, but would frequently forgot. Planning to start taking in the evening when getting ready for bed. Plan to keep second half of pill at nightstand as a visual reminder and to use insulin notebook to help raheel if she has used her evening dose.  Recently moved to a new apartment complex that has an exercise room.  Trying to use the treadmill and swim in pool.  Has not made a routine yet.    ASSESSMENT:    Assessment/Plan   Problem List Items Addressed This Visit       Heart failure with mid-range ejection fraction - Primary     Discontinuing Jardiance due to recurrent yeast infections.  No adjustments made to other medications. Continue to work on adherence with Entresto.         Relevant Orders    Referral to Clinical Pharmacy         RECOMMENDATIONS/PLAN:    CONTINUE  Entresto 24-26mg 0.5 tablet BID  Metoprolol succinate 25mg daily  STOP  Jardiance      Last Appnt with Referring Provider: 8/26/24  Next Appnt with Referring Provider: 8/29/25  Clinical Pharmacist follow up: 2/26/25  VAF/Application Expiration: Yes    Date: 4/15/25  Type of Encounter: Zaida Klein PharmD    Verbal consent to manage patient's drug therapy was obtained from the patient . They were informed they may decline to participate or withdraw from participation in pharmacy services at any time.    Continue all meds under the continuation of care with the referring provider and clinical pharmacy team.       07-May-2020 13:20
